# Patient Record
Sex: FEMALE | Race: WHITE | HISPANIC OR LATINO | Employment: PART TIME | URBAN - METROPOLITAN AREA
[De-identification: names, ages, dates, MRNs, and addresses within clinical notes are randomized per-mention and may not be internally consistent; named-entity substitution may affect disease eponyms.]

---

## 2020-01-29 ENCOUNTER — OFFICE VISIT (OUTPATIENT)
Dept: OBGYN CLINIC | Facility: CLINIC | Age: 30
End: 2020-01-29

## 2020-01-29 VITALS
WEIGHT: 148 LBS | SYSTOLIC BLOOD PRESSURE: 106 MMHG | DIASTOLIC BLOOD PRESSURE: 60 MMHG | BODY MASS INDEX: 24.66 KG/M2 | HEIGHT: 65 IN

## 2020-01-29 DIAGNOSIS — N91.2 AMENORRHEA: Primary | ICD-10-CM

## 2020-01-29 DIAGNOSIS — N89.8 VAGINAL DISCHARGE: ICD-10-CM

## 2020-01-29 DIAGNOSIS — Z3A.01 LESS THAN 8 WEEKS GESTATION OF PREGNANCY: ICD-10-CM

## 2020-01-29 LAB
BV WHIFF TEST VAG QL: NEGATIVE
CLUE CELLS SPEC QL WET PREP: NEGATIVE
SL AMB POCT URINE HCG: POSITIVE
T VAGINALIS VAG QL WET PREP: NEGATIVE
YEAST VAG QL WET PREP: NORMAL

## 2020-01-29 PROCEDURE — 87491 CHLMYD TRACH DNA AMP PROBE: CPT | Performed by: OBSTETRICS & GYNECOLOGY

## 2020-01-29 PROCEDURE — 87591 N.GONORRHOEAE DNA AMP PROB: CPT | Performed by: OBSTETRICS & GYNECOLOGY

## 2020-01-29 PROCEDURE — 81025 URINE PREGNANCY TEST: CPT | Performed by: OBSTETRICS & GYNECOLOGY

## 2020-01-29 PROCEDURE — 87210 SMEAR WET MOUNT SALINE/INK: CPT | Performed by: OBSTETRICS & GYNECOLOGY

## 2020-01-29 PROCEDURE — G0145 SCR C/V CYTO,THINLAYER,RESCR: HCPCS | Performed by: OBSTETRICS & GYNECOLOGY

## 2020-01-29 PROCEDURE — 99214 OFFICE O/P EST MOD 30 MIN: CPT | Performed by: OBSTETRICS & GYNECOLOGY

## 2020-01-29 RX ORDER — PRENATAL VIT NO.126/IRON/FOLIC 28MG-0.8MG
1 TABLET ORAL
Qty: 90 TABLET | Refills: 3 | Status: SHIPPED | OUTPATIENT
Start: 2020-01-29 | End: 2020-10-26 | Stop reason: SDUPTHER

## 2020-01-29 NOTE — PATIENT INSTRUCTIONS
Pregnancy   WHAT YOU NEED TO KNOW:   A normal pregnancy lasts about 40 weeks  The first trimester lasts from your last period through the 12th week of pregnancy  The second trimester lasts from the 13th week of your pregnancy through the 23rd week  The third trimester lasts from your 24th week of pregnancy until your baby is born  If you know the date of your last period, your healthcare provider can estimate your due date  You may give birth to your baby any time from 37 weeks to 2 weeks after your due date  DISCHARGE INSTRUCTIONS:   Return to the emergency department if:   · You develop a severe headache that does not go away  · You have new or increased vision changes, such as blurred or spotted vision  · You have new or increased swelling in your face or hands  · You have pain or cramping in your abdomen or low back  · You have vaginal bleeding  Contact your healthcare provider or obstetrician if:   · You have abdominal cramps, pressure, or tightening  · You have a change in vaginal discharge  · You cannot keep food or drinks down, and you are losing weight  · You have chills or a fever  · You have vaginal itching, burning, or pain  · You have yellow, green, white, or foul-smelling vaginal discharge  · You have pain or burning when you urinate, less urine than usual, or pink or bloody urine  · You have questions or concerns about your condition or care  Medicines:   · Prenatal vitamins  provide some of the extra vitamins and minerals you need during pregnancy  Prenatal vitamins may also help to decrease the risk of certain birth defects  · Take your medicine as directed  Contact your healthcare provider if you think your medicine is not helping or if you have side effects  Tell him or her if you are allergic to any medicine  Keep a list of the medicines, vitamins, and herbs you take  Include the amounts, and when and why you take them   Bring the list or the pill bottles to follow-up visits  Carry your medicine list with you in case of an emergency  Follow up with your healthcare provider or obstetrician as directed:  Go to all of your prenatal visits during your pregnancy  Write down your questions so you remember to ask them during your visits  Body changes that may occur during your pregnancy:   · Breast changes  you will experience include tenderness and tingling during the early part of your pregnancy  Your breasts will become larger  You may need to use a support bra  You may see a thin, yellow fluid, called colostrum, leak from your nipples during the second trimester  Colostrum is a liquid that changes to milk about 3 days after you give birth  · Skin changes and stretch marks  may occur during your pregnancy  You may have red marks, called stretch marks, on your skin  Stretch marks will usually fade after pregnancy  Use lotion if your skin is dry and itchy  The skin on your face, around your nipples, and below your belly button may darken  Most of the time, your skin will return to its normal color after your baby is born  · Morning sickness  is nausea and vomiting that can happen at any time of day  Avoid fatty and spicy foods  Eat small meals throughout the day instead of large meals  Delores may help to decrease nausea  Ask your healthcare provider about other ways of decreasing nausea and vomiting  · Heartburn  may be caused by changes in your hormones during pregnancy  Your growing uterus may also push your stomach upward and force stomach acid to back up into your esophagus  Eat 4 or 5 small meals each day instead of large meals  Avoid spicy foods  Avoid eating right before bedtime  · Constipation  may develop during your pregnancy  To treat constipation, eat foods high in fiber such as fiber cereals, beans, fruits, vegetables, whole-grain breads, and prune juice  Get regular exercise and drink plenty of water   Your healthcare provider may also suggest a fiber supplement to soften your bowel movements  Talk to your healthcare provider before you use any medicines to decrease constipation  · Hemorrhoids  are enlarged veins in the rectal area  They may cause pain, itching, and bright red bleeding from your rectum  To decrease your risk of hemorrhoids, prevent constipation and do not strain to have a bowel movement  If you have hemorrhoids, soak in a tub of warm water to ease discomfort  Ask your healthcare provider how you can treat hemorrhoids  · Leg cramps and swelling  may be caused by low calcium levels or the added weight of pregnancy  Raise your legs above the level of your heart to decrease swelling  During a leg cramp, stretch or massage the muscle that has the cramp  Heat may help decrease pain and muscle spasms  Apply heat on your muscle for 20 to 30 minutes every 2 hours for as many days as directed  · Back pain  may occur as your baby grows  Do not stand for long periods of time or lift heavy items  Use good posture while you stand, squat, or bend  Wear low-heeled shoes with good support  Rest may also help to relieve back pain  Ask your healthcare provider about exercises you can do to strengthen your back muscles  Stay healthy during your pregnancy:   · Eat a variety of healthy foods  Healthy foods include fruits, vegetables, whole-grain breads, low-fat dairy foods, beans, lean meats, and fish  Drink liquids as directed  Ask how much liquid to drink each day and which liquids are best for you  Limit caffeine to less than 200 milligrams each day  Limit your intake of fish to 2 servings each week  Choose fish low in mercury such as canned light tuna, shrimp, crab, salmon, cod, or tilapia  Do not  eat fish high in mercury such as swordfish, tilefish, yovanny mackerel, and shark  · Take prenatal vitamins as directed  Your need for certain vitamins and minerals, such as folic acid, increases during pregnancy   Prenatal vitamins provide some of the extra vitamins and minerals you need  Prenatal vitamins may also help to decrease the risk of certain birth defects  · Ask how much weight you should gain during your pregnancy  Too much or too little weight gain can be unhealthy for you and your baby  · Talk to your healthcare provider about exercise  Moderate exercise can help you stay fit  Your healthcare provider will help you plan an exercise program that is safe for you during pregnancy  · Do not smoke  If you smoke, it is never too late to quit  Smoking increases your risk of a miscarriage and other health problems during your pregnancy  Smoking can cause your baby to be born too early or weigh less at birth  Ask your healthcare provider for information if you need help quitting  · Do not drink alcohol  Alcohol passes from your body to your baby through the placenta  It can affect your baby's brain development and cause fetal alcohol syndrome (FAS)  FAS is a group of conditions that causes mental, behavior, and growth problems  · Talk to your healthcare provider before you take any medicines  Many medicines may harm your baby if you take them when you are pregnant  Do not take any medicines, vitamins, herbs, or supplements without first talking to your healthcare provider  Never use illegal or street drugs (such as marijuana or cocaine) while you are pregnant  Safety tips:   · Avoid hot tubs and saunas  Do not use a hot tub or sauna while you are pregnant, especially during your first trimester  Hot tubs and saunas may raise your baby's temperature and increase the risk of birth defects  · Avoid toxoplasmosis  This is an infection caused by eating raw meat or being around infected cat feces  It can cause birth defects, miscarriages, and other problems  Wash your hands after you touch raw meat  Make sure any meat is well-cooked before you eat it  Avoid raw eggs and unpasteurized milk   Use gloves or ask someone else to clean your cat's litter box while you are pregnant  · Ask your healthcare provider about travel  The most comfortable time to travel is during the second trimester  Ask your healthcare provider if you can travel after 36 weeks  You may not be able to travel in an airplane after 36 weeks  He may also recommend that you avoid long road trips  © 2017 [employer] [employer] Information is for End User's use only and may not be sold, redistributed or otherwise used for commercial purposes  All illustrations and images included in CareNotes® are the copyrighted property of [employer]  or [employer]  The above information is an  only  It is not intended as medical advice for individual conditions or treatments  Talk to your doctor, nurse or pharmacist before following any medical regimen to see if it is safe and effective for you

## 2020-01-30 LAB
C TRACH DNA SPEC QL NAA+PROBE: NEGATIVE
N GONORRHOEA DNA SPEC QL NAA+PROBE: NEGATIVE

## 2020-02-05 ENCOUNTER — INITIAL PRENATAL (OUTPATIENT)
Dept: OBGYN CLINIC | Facility: CLINIC | Age: 30
End: 2020-02-05

## 2020-02-05 VITALS
DIASTOLIC BLOOD PRESSURE: 70 MMHG | SYSTOLIC BLOOD PRESSURE: 118 MMHG | WEIGHT: 147 LBS | BODY MASS INDEX: 24.49 KG/M2 | HEART RATE: 81 BPM | HEIGHT: 65 IN

## 2020-02-05 DIAGNOSIS — Z34.91 PRENATAL CARE IN FIRST TRIMESTER: Primary | ICD-10-CM

## 2020-02-05 DIAGNOSIS — R51.9 NONINTRACTABLE HEADACHE, UNSPECIFIED CHRONICITY PATTERN, UNSPECIFIED HEADACHE TYPE: Primary | ICD-10-CM

## 2020-02-05 LAB
LAB AP GYN PRIMARY INTERPRETATION: NORMAL
Lab: NORMAL

## 2020-02-05 PROCEDURE — OBC

## 2020-02-05 RX ORDER — ACETAMINOPHEN 500 MG
TABLET ORAL
Qty: 30 TABLET | Refills: 0 | Status: SHIPPED | OUTPATIENT
Start: 2020-02-05 | End: 2020-02-12

## 2020-02-05 NOTE — PATIENT INSTRUCTIONS
The First Trimester  (up through 14 weeks)   YOUR BABY   · Your baby starts to develop when your egg and a sperm come together  · Your baby grows inside your uterus (also called your womb)  It floats inside a bag of water - called the amniotic sac  The baby is connected to you by an umbilical cord which goes from the babys belly to the placenta  The placenta is attached to your uterus, and the placenta is where blood, oxygen, and food cross over from you to your baby  · Unhealthy things like drugs, alcohol, and tobacco can also cross over from you to your baby through the placenta  It is important to avoid these things as they can be harmful to how your baby develops and grows  · By the end of the first month, your babys heart is beating  The baby is about the size of a piece of rice  · By the end of the second month, all of you babys organ (heart, lungs, brain, skull) have completely formed  Now they just have to continue maturing and growing  The baby is about the size of a grape  · By the end of the third month, your baby is about 4 inches long! YOUR BODY   · Your period stops - this might be the first sign that you have that you are pregnant   · Your breasts may become sore and tender  · You may feel very tired  · You may have an upset stomach with nausea and/or vomiting which can occur at any time of day - or sometimes all day long  · You may feel malin and cranky  You may also feel afraid or happy  This is all normal and natural!   · You may lose or gain weight  This is okay as well, as long as you are not losing or gaining a lot of weight          The Second Trimester (14-28 weeks)   YOUR BABY   * 4th month   · your baby has eyelashes and eyebrows   · your baby kicks, moves, and swallows   · your baby is 6 to 7 inches long   * 5th month   · your baby has fingernails   · your baby starts to having sleeping and awake cycles   · baby becomes very active (even though you may not always feel it)   · your baby is 8 to 12 inches long and weighs anywhere from ½ to 1 pound   * 6th month   · your babys eyes are almost completely formed and they will soon open and close   · babys skin is red and wrinkly and covered with a fine, soft hair called lanugo   · baby continues to be very active and you should be feeling it very well and very often   · your baby is 11 to 14 inches long     YOUR BODY   · Morning sickness usually starts to go away and women generally start to gaining weight more quickly  · You may start to get stretch marks on your belly and/or breasts which can be itchy  Softly rub lotion onto them to help relieve the itching  · Your vaginal discharge may become whitish in color  · You may become constipated  Try increasing fiber in your diet, or you may take a stool softener pill (such as Colace) to relieve the discomfort  · You may start to have heartburn  Medications like Tums or Maalox may help to relieve this  Try staying in a sitting position for at least an hour after meals to decrease heartburn  · You should try to get 8 hours of sleep at night, plus a nap during the day if possible  · You should not sit for longer than 2 hours without taking a break to stretch your legs  The Third Trimester  (28-42 weeks)  YOUR BABY   · your baby sucks its thumb now! · your baby can hear voices and respond to touch   so talk to him or her!!   · your babys brain grows and develops most in the last 2 months of pregnancy   · babys head and bones are soft and flexible so they can fit through the birth canal   · babys movements change towards the end of pregnancy because there is less room for kicking and stretching in your belly   · babys lungs are not fully developed and completely ready to breathe on their own until the last 3-4 weeks before your due date    YOUR BODY   · your belly is growing a lot now   · it may become more difficult to sleep well at night or to be as active as you usually are   · you may sweat more than usual   · you will become more off-balancebe careful not to fall!!   · you may develop hemorrhoids (which can be painful and make it difficult to sit down)   · the last two months of pregnancy can become very uncomfortablewith backaches, headaches, and heartburn   · you can start to have contractions  as long as they are irregular and less than 5 per hour, this is a normal part of your body getting ready to have a baby   · your cervix may start to thin out and open upto get ready for delivery   · you may find yourself needing to pee very often  because baby is pressing on your bladder so much   · you may get out of breathe more quickly than usual          Is my baby developing normally? GENETIC SCREENING    One of the concerns that many women have about their pregnancy is whether their baby is developing normally  There are various different tests that we can use try to help determine whether babies are developing normally or not  Some women have a higher risk for their baby to develop abnormally (sometimes called a birth defect), but it can happen to ANYONE  That is why we offer these tests to ALL women during their pregnancies  None of these tests are required  It is your choice which ones you choose to have done or choose to NOT have done during your pregnancy  Some of these tests are only available at a particular time during your pregnancy, so it is important to make decisions about what testing you desire early in the pregnancy  Here is some information about these different tests to help you make decisions about whether any of these tests are right for you       * ANATOMY ULTRASOUND: this ultrasound is done between 18 to 22 weeks of pregnancy and looks VERY closely at all of your babys parts and pieces to look for signs of any abnormal development; ultrasound is not perfect and CANNOT detect ALL types of birth defects (especially Down Syndrome) - but it is a very helpful test     * SEQUENTIAL SCREENING: this is actually a combination of tests which include an ultrasound measuring the back of your baby's neck between 11-13 weeks and bloodwork from you at that time and again between 15-22 weeks; this test can help tell us the risk for your baby to be affected by certain chromosome abnormalities or birth defects  * QUAD SCREEN: this test is done with just bloodwork from you between 15-22 weeks of pregnancy; it can help tell us the risk for your baby to be affected by certain chromosome abnormalities or birth defects  * CELL-FREE DNA SCREEN: this test is done with just bloodwork from you any time after 10 weeks of pregnancy; it is very accurate at telling us if your baby has a high chance for Down syndrome or other chromosome abnormalities but is generally reserved for women who have a high-risk factor for a baby with a chromosome abnormality  * MATERNAL SERUM ALPHA-FETO PROTEIN SCREEN:  this test is done with just bloodwork from you between 15-22 weeks of pregnancy; it helps us get an idea if your baby is developing a birth defect like spina bifida  * AMNIOCENTESIS: this test involves using a very thin needle inserted into your uterus to take out a little bit of the fluid around your baby for testing; it can be performed any time after 16 weeks of pregnancy; this test tells us for certain if your baby has particular birth defects (especially chromosome abnormalities); there is a very small risk for miscarriage to occur when you have this testing performed; this test is generally reserved for women who have a high-risk factor for a baby with an abnormality         Women who have a higher risk for babies to develop abnormally (sometimes called a birth defect) include women with the following:   · Age 28 years or older   · Obesity at the time of conception   · Diabetes at the time of conception   · A previous child with a birth defect   · Taking medications which may cause a birth defect     Here are some important questions to ask yourself when deciding which (if any) of these tests you want to have performed  · Do I want to know before birth if my baby has a birth defect? · What will I do with this information if the result shows a high chance for a birth defect? · How would learning about a birth defect help me make choices about my pregnancy? · Will these tests help me feel more reassured or just more anxious and afraid? Medications that are safe to take     Here is a list of medications which are safe for you to take during pregnancy without having to discuss with the nurse practitioner or physician:     * Tylenol/Acetaminophen (headache or fever)   * Benadryl/Diphenhydramine (allergies, runny nose, difficult sleeping, itching)   * Claritin/Loratidine (allergies, runny nose)   * Zyrtec/Cetirizine (allergies, runny nose)   * Allegra/Fexofenadine (allergies)  * Robitussin/Guaifenesin (coughing)   * Sudafed/Pseudoephedrine (runny nose/congestion)   * Colace/Docusate sodium (constipation)   * Maalox/Magnesium hydroxide (heartburn, indigestion)   * Zantac/Ranitidine (heartburn, indigestion)   * Pepcid/Famotidine (heartburn, indigestion)   * Tums/Calcium carbonate (heartburn, nausea)   * Kaopectate/Bismuth subsalicylate (diarrhea)   * Immodium/Loperamide (diarrhea)   * Vitamin B6/Pyrodoxine(nausea)   * Doxylamine/Unisom (nausea, insomnia)     Any other medications should be discussed with either our nurse practitioner or one of our physicians before taking  Nutrition in Pregnancy  Good Nutrition is a VERY important part of having a healthy pregnancy and healthy baby    You should follow a healthy diet which include the following:   · Vegetables (which are dark green and leafy): at least 2 servings each day   · Protein (meat, eggs, beans, nuts, peanut butter): 3-4 servings each day   · Breads/whole grains (bread, pasta, rice, tortillas, potatoes): 3 servings each day   · Dairy (milk, yogurt, cheese): 3-4 servings each day   · Water: 6-8 glasses per day   · Calories: approximately 2000 to 2200 calories per day     Weight Gain   Recommended weight gain for you during your pregnancy is based on your body mass index (BMI) at the time that you became pregnant  Pre-pregnant BMI Recommended weight gain   Underweight (BMI less than 18 5) 28 to 40 pounds   Normal weight (BMI 18 5-24 9) 25 to 35 pounds   Overweight (BMI 25-29 9) 15 to 25 pounds   Obese (BMI 30 or greater) 11 to 20 pounds     Food safety   It is VERY important to eat only safely-prepared foods during pregnancy as you and your baby have a higher risk than usual for being affected by foodborne illnesses  Follow these steps to ensure that you and your baby are safe from foodborne illnesses while you are pregnant:   · wash hands thoroughly with warm water and soap before and after handling any foods   · wash cutting boards, dishes, utensils, and countertops with hot water and soap before and after preparing any foods   · rinse raw fruits and vegetables thoroughly under running water before eating   · keep raw meat and seafood separate from other foods and use different cutting boards/utensils to handle raw meat than for other foods   · put cooked food on a freshly clean plate   · cook all of your foods thoroughly   · discard foods that have been left out for more than 2 hours   · refrigerate or freeze any foods than can spoil     There are three particular foodborne risks that you should be aware of and avoid as they can cause serious harm to your unborn child       * Listeria (a harmful bacteria)   · dont eat hot dogs or deli meats (unless theyre reheated until steaming hot)   · dont eat soft cheeses (such as Feta, Brie, Camembert) unless they are specifically labeled as being made with pasteurized milk   · dont drink raw (unpasteurized) milk   · dont eat refrigerated pates or meat spreads   · dont eat refrigerated smoked seafood unless its in a cooked dish like a casserole     * Mercury (a metal which is found in certain fish in high levels)   · dont eat shark, tilefish, yovanny mackerel, or swordfish   · dont eat more than 12 ounces per week of shrimp, salmon, pollock, or catfish   · when eating tuna fish, you can have up to 6 ounces per week of canned albacore tuna OR up to 12 ounces of canned light tuna     * Toxoplasma (a harmful parasite)   · cook meat thoroughly before eating   · wear gloves when gardening or handling sand from a childs sandbox   · if you ha tve a cat, have someone else change the litter box while you are pregnant  · if you HAVE to clean it yourself, be sure to wash your hands thoroughly afterwards with warm water and soap  · dont get a NEW cat while you are pregnant          Exercise and Pregnancy     Exercise has many benefits for you  It:   · Improves your posture and appearance   · Relieves back pain   · Improves circulation   · Increases flexibility   · Decreases stress   · Helps you feel good and gives you a better self-image   · Gives you energy   · Helps you sleep   · Strengthens and stretches your muscles, which can help ease the pregnancy discomforts   · Increases your stamina and flexibility     The healthier you are going into labor, the faster and easier your recovery will be after birth  Exercise may be good for your baby too  Both of you feel calm and happy after a workout  Also, your unborn baby likes the motion  How much time to exercise:   Check with your health provider before you begin and make sure you are healthy enough to start  If you exercised before pregnancy, it is safe to do moderate exercise of 30 minutes or more every day  If not, start with 20 minutes a day, 3 times a week  If you were jogging before your pregnancy, it is safe to continue    If you arent used to jogging, pregnancy is not a good time to start  Your First Trimester   In the first three months you might feel tired and sick to your stomach  Only exercise when you feel up to it  But, even a little bit of activity can boost your energy  Consider a walk, stretch, or some yoga  Your Second Trimester   You might have more energy, so take advantage of the times when youre feeling good to do activities you enjoy  Safe exercises include low-impact aerobics (which elevate your heart rate), easy dancing, walking, swimming, stationary cycling, easy cross-country skiing, and yoga  Go easy on high-impact sports like running, tennis, or sports that could cause you to fall, like downhill skiing or horseback riding  Your Third Trimester   You might feel more tired and have backaches from the extra weight you are carrying  Your third trimester is an important time to use good posture, bend at the knees to pick things up, and not lift any heavy objects  Safety Rules   · Always stretch before and after working out  · Never workout so hard that you cant talk at the same time  · Dont exercise in very hot or very cold weather  · Drink plenty of water before, during and after exercise  · Be aware of your comfort level - stop what youre doing if you have pain, if you feel faint, or if youre becoming exhausted  · Avoid lying flat on your back after the first trimester as this position can decrease blood flow to your uterus  NAUSEA AND VOMITING IN PREGNANCY    1  Eat meals and snacks slowly  2  Eat every 1-2 hours to avoid a full stomach  3  Don't skip meals, avoid empty stomach  4  Eat a snack prior to getting out of bed  5  Avoid food and beverages with a strong smell  6  Avoid dehydration - drink enough fluid to keep your urine pale yellow  7  Drink fluids before a meal to minimize the effect of a full stomach  8  Limit the amount of coffee and beverages that contain caffeine    9  Eliminate spicy, odorous, high fat (fried foods), acidic (tomato products), and sweet foods  10  Fluid that contain lemon, mint, or orange can usually be well-tolerated  11  Snacks and meals that contain low-fat protein (lean meats, fish, poultry and eggs) along with eating easily digestible carbs (fruit, rice, toast, crackers and dry cereal) may be tolerated better  12  Foods with ladonna may be well-tolerated  Try using ladonna root powder, capsules, or extract (up to 1000mg per day)  13  Drink liquids in small amounts  14  Try taking Vitamin B6 (50mg at bedtime, 25mg in the morning, and 25mg in the afternoon) with Unisom/Doxylamine (25mg at bedtime)  15  If nausea and vomiting persist, please contact the office  SEX AND PREGNANCY    1  Is sex safe during pregnancy? Sex is usually perfectly safe throughout pregnancy and does not hurt your growing baby  [place] and orgasms are fine unless you have a medical problem  If you are concerned, discuss it with your healthcare provider  2  How often is sex OK? Frequent sex should not hurt the baby if you are having a healthy pregnancy  3  Does sex cause miscarriage? There are no facts that link orgasms and miscarriage  However, if you have a history of miscarriage, your provider may caution you against sex and orgasm in your first trimester  4  What if I feel the baby move after sex? Is that a bad sign? No   The baby is well-protected in your uterus  And, the baby often moves a lot no matter what you do  5  Should my partner be putting weight on my abdomen? No, not as you get bigger  Find new positions for lovemaking as your pregnancy goes along  Try lying on your side or you be the one on top  Don't lie flat on your back  6  Can sex cause me to go into labor early? Not normally  However, orgasms cause the uterus to have mild contractions if you are near labor    If you have a history of early labor, your provider might warn you against intercourse and orgasm  Also, nipple stimulation causes your uterus to contract if you are near labor  Ask your provider if this is safe for you  7   Are all kinds of sex safe during pregnancy? No   Never let your partner blow air into your vagina  Do not put any object in your vagina that could cause injury or infection  If you have heavy bleeding or your water breaks during sex, stop and call your provider immediately  8  What if I don't feel like having sex? Be open about it with your partner  In early pregnancy, you may feel too tired or be sick to your stomach  In the last weeks of pregnancy, your physical changes may make you feel too large or too awkward for sex  You may also be thinking more about motherhood than sex by this time  However, in your middle months, you may actually want sex more often than usual   If your partner doesn't want sex for fear of hurting you or the baby, be reassuring that sex is safe and natural during pregnancy  9  Are there times I should avoid sex? Yes, if:  · Sex is painful  · You have unexplained vaginal bleeding  · Your have a low-lying placenta or placenta previa  · You have  labor  · Your water breaks  · You are pregnancy with twins or triplets  · You or your partner heave an unhealed herpes sore or any STD  · You can't get into a comfortable position            WARNING SIGNS DURING PREGNANCY  Call our office at [phone] for any of the followin  Vaginal bleeding  2  Sharp abdominal pain that does not go away  3  Fever (more than 100 4 and is not relieved by Tylenol)  4  Persistent vomiting lasting greater than 24 hours  5  Chest pain   6  Pain or burning when you urinate  7  Severe headache that doesn't resolve with Tylenol  8  Blurred vision or seeing spots in your vision  9  Sudden swelling of your face or hands  10  Redness, swelling or pain in a leg  11  A sudden weight gain in just a few days  12   Decrease in your baby's movement (after 28 weeks or the 6th month of pregnancy)  13  A loss of watery fluid from your vagina - can be a gush, a trickle or continuous wetness  14  After 20 weeks of pregnancy, rhythmic cramping (greater than 4 per hour) or menstrual like low/pelvic pain            BREASTFEEDING     BENEFITS FOR BABIES   * stronger immune systems (less allergies, eczema, asthma, and childhood cancers)   * less diarrhea and constipation or other GI diseases   * fewer colds and ear infections   * better vision and teeth (fewer cavities)   * improves IQ   * lower rates of diabetes and obesity in childhood     BENEFITS FOR MOMS   · promotes faster weight loss after delivery   · lower risk for postpartum depression   · lower risk for breast, uterine, and ovarian cancers   · lower risk for osteoporosis developing with age   · easier than formula - is always right with you, clean, and the right temperature   · less expensive than formulaits FREE !!!!     KEYS TO SUCCESSFUL BREASTFEEDING   · keep baby skin-to-skin until after first feeding event   · keep baby in your room with you during your hospital stay after delivery   · avoid any bottle feedings (unless medically necessary)   · limit the use of pacifiers and swaddling   · ask for help if you are having any issueslactation consultants (who specialize in breastfeeding) are available to help you   · a healthy diet for momeating a variety of foods and portions in moderation    THINGS YOU SHOULD KNOW ABOUT BREASTFEEDING   · most medications are considered compatible with breastfeeding by the [address] [place] [address] [employer], but you should check with your health care provider or lactation consultant prior to taking a new medicationjust to be sure it is safe   · alcohol (beer, wine, liquor) can be passed from mother to baby through breast milkan occasional, social drink is deemed acceptable by the [employer] of [place] [employer]   more than that should be avoided   · breastfeeding is NOT an effective method of birth control   · nicotine (in cigarettes) can pass from mother to baby through breast milk   however, for mothers who smoke, it is still healthier to breastfeed than use formula   · caffeine should be limited to 1-3 cups per dayincludes coffee, soda, energy drinks           VACCINES IN PREGNANCY    TDAP  Whopping cough (or pertusSsis) can be serious for anyone, but for your , it can be life-threatning  Up to 20 babies die each year in the [place]  Due to whopping cough  About half of babies younger than 3year old who get whopping cough need treatment in the hospital   The younger the baby is when he or she gets whopping cough, the more likely he or she will need to be treated in a hospital   When you receive the whopping cough vaccine (Tdap) during your pregnancy, your body will create protective antibodies and pass some of them to your baby before birth  These antibodies can help protect your baby from getting whopping cough until they are old enough to be vaccinated themselves (usually around 7 months of age)  INFLUENZA  Changes in your immune, heart, and lung functions during pregnancy make you more likely to get seriously ill from the flu  Catching the flu also increases your chances for serious problems for your developing baby, including premature labor and delivery  It is recommended that all women who are pregnant during flu season should receive an influenza vaccine  SEAT BELT USE IN PREGNANCY    Whether you are pregnant or not, you should wear a seat belt EVERY time you are in a car  A seat belt is the best protection for both you AND your unborn child  To use a seat belt properly while you are pregnant, you may need to adjust the front seat several times as you grow so that there is always at least 10 inches between the center of your chest and the steering wheel or dashboard, yet still be able to comfortably reach the pedals    The shoulder belt should lay across your chest (between your breasts) and away from your neck  The lap belt should be secured BELOW your belly so that it fits snugly across your hips and pelvic bone  You should NOT disable the air bag on your vehicle  Seat belts and air bags work best when used together to protect you and your unborn child

## 2020-02-05 NOTE — LETTER
Proof of Pregnancy Letter    Vita Sweet  1990  [address] [place]        02/05/20      Vita Sweet is a patient at our facility  Vita Sweet Estimated Date of Delivery: 9/4/2020       Any questions or concerns, please feel free to contact our office      Sincerely,     [facility]

## 2020-02-05 NOTE — PROGRESS NOTES
OBSTETRIC INTAKE VISIT    Henry Thompson presents today for initial OB intake  History obtained from patient and she reports it as follows:    Past Medical History:   Diagnosis Date    Hyperlipemia      History reviewed  No pertinent surgical history  OB History    Para Term  AB Living   2 1 1     1   SAB TAB Ectopic Multiple Live Births           1      # Outcome Date GA Lbr Rory/2nd Weight Sex Delivery Anes PTL Lv   2 Current            1 Term 18 40w0d  3374 g (7 lb 7 oz) F Vag-Breech EPI  ANITRA     Social History     Tobacco Use    Smoking status: Never Smoker    Smokeless tobacco: Never Used   Substance Use Topics    Alcohol use: Not Currently    Drug use: Never       Current Outpatient Medications:     prenatal vitamin (CLASSIC FORMULA) 28-0 8 mg, Take 1 tablet by mouth daily with breakfast, Disp: 90 tablet, Rfl: 3    No Known Allergies    FRANK 2020    Review of Systems:   Denies vaginal bleeding or leaking of fluid  Denies uterine contractions or abdominal pain  Exam:    WT 147LB, HT 5'5, B/P 118/70, HR88    Plan:  1  1  Prenatal care in first trimester  - Ambulatory referral to social work care management program  - Ambulatory Referral to Maternal Fetal Medicine  - Prenatal Panel  - Hemoglobin Electrophoresis  - Hepatitis C antibody; Future  2  Next ultrasound scheduled for 2020  3  Return in 1 week  for next prenatal H/P visit  4  Full OB physical and pelvic exam to be done at next visit with pap/GC/CT  5  Referral placed for  consultation    6  Reviewed the following educational topics with patient:   -routine prenatal visit/ultrasound/labwork schedule   -nutritional demands of pregnancy, healthy dietary habits   -listeria, toxoplasmosis, seafood precautions   -weight gain expectations (based on pre-pregnant BMI)   -exercise, rest, and sexual activity during pregnancy   -abstinence from alcohol, tobacco, and illegal drugs   -common discomforts of pregnancy and appropriate management   -OTC medications safe to use in pregnancy   -genetic screening options   -WIC referral   -symptoms to report to [facility] provider    -signs of PTL    -vaginal bleeding/leaking of fluid    -severe n/v-unable to tolerate ANY food/fluids for more than 24 hours    Patient declined flu vaccine, explained to patient how to contact OB provider after office hours  Patient oriented to office and all questions were answered        Edwin Calhoun RN  2/5/2020

## 2020-02-06 ENCOUNTER — OFFICE VISIT (OUTPATIENT)
Dept: FAMILY MEDICINE CLINIC | Facility: CLINIC | Age: 30
End: 2020-02-06

## 2020-02-06 ENCOUNTER — APPOINTMENT (OUTPATIENT)
Dept: LAB | Facility: HOSPITAL | Age: 30
End: 2020-02-06
Attending: OBSTETRICS & GYNECOLOGY
Payer: COMMERCIAL

## 2020-02-06 VITALS
BODY MASS INDEX: 24.66 KG/M2 | WEIGHT: 148 LBS | HEIGHT: 65 IN | TEMPERATURE: 97 F | DIASTOLIC BLOOD PRESSURE: 64 MMHG | RESPIRATION RATE: 18 BRPM | HEART RATE: 81 BPM | OXYGEN SATURATION: 98 % | SYSTOLIC BLOOD PRESSURE: 110 MMHG

## 2020-02-06 DIAGNOSIS — Z34.91 PRENATAL CARE IN FIRST TRIMESTER: ICD-10-CM

## 2020-02-06 DIAGNOSIS — Z86.39 HX OF HYPERLIPIDEMIA: ICD-10-CM

## 2020-02-06 DIAGNOSIS — Z00.00 PHYSICAL EXAM: Primary | ICD-10-CM

## 2020-02-06 LAB
ABO GROUP BLD: NORMAL
BACTERIA UR QL AUTO: ABNORMAL /HPF
BASOPHILS # BLD AUTO: 0 THOUSANDS/ΜL (ref 0–0.1)
BASOPHILS NFR BLD AUTO: 0 % (ref 0–1)
BILIRUB UR QL STRIP: NEGATIVE
BLD GP AB SCN SERPL QL: NEGATIVE
CHOLEST SERPL-MCNC: 307 MG/DL
CLARITY UR: CLEAR
COLOR UR: ABNORMAL
EOSINOPHIL # BLD AUTO: 0.1 THOUSAND/ΜL (ref 0–0.4)
EOSINOPHIL NFR BLD AUTO: 1 % (ref 0–6)
ERYTHROCYTE [DISTWIDTH] IN BLOOD BY AUTOMATED COUNT: 13.5 %
GLUCOSE UR STRIP-MCNC: NEGATIVE MG/DL
HBV SURFACE AG SER QL: NORMAL
HCT VFR BLD AUTO: 37 % (ref 36–46)
HCV AB SER QL: NORMAL
HDLC SERPL-MCNC: 49 MG/DL
HGB BLD-MCNC: 12.6 G/DL (ref 12–16)
HGB UR QL STRIP.AUTO: NEGATIVE
KETONES UR STRIP-MCNC: ABNORMAL MG/DL
LDLC SERPL CALC-MCNC: 227 MG/DL
LEUKOCYTE ESTERASE UR QL STRIP: 25
LYMPHOCYTES # BLD AUTO: 1.9 THOUSANDS/ΜL (ref 0.5–4)
LYMPHOCYTES NFR BLD AUTO: 19 % (ref 25–45)
MCH RBC QN AUTO: 29.6 PG (ref 26–34)
MCHC RBC AUTO-ENTMCNC: 34 G/DL (ref 31–36)
MCV RBC AUTO: 87 FL (ref 80–100)
MONOCYTES # BLD AUTO: 0.5 THOUSAND/ΜL (ref 0.2–0.9)
MONOCYTES NFR BLD AUTO: 5 % (ref 1–10)
MUCOUS THREADS UR QL AUTO: ABNORMAL
NEUTROPHILS # BLD AUTO: 7.3 THOUSANDS/ΜL (ref 1.8–7.8)
NEUTS SEG NFR BLD AUTO: 74 % (ref 45–65)
NITRITE UR QL STRIP: NEGATIVE
NON-SQ EPI CELLS URNS QL MICRO: ABNORMAL /HPF
PH UR STRIP.AUTO: 6 [PH]
PLATELET # BLD AUTO: 266 THOUSANDS/UL (ref 150–450)
PMV BLD AUTO: 8.8 FL (ref 8.9–12.7)
PROT UR STRIP-MCNC: NEGATIVE MG/DL
RBC # BLD AUTO: 4.25 MILLION/UL (ref 4–5.2)
RBC #/AREA URNS AUTO: ABNORMAL /HPF
RH BLD: POSITIVE
RUBV IGG SERPL IA-ACNC: >175 IU/ML
SP GR UR STRIP.AUTO: 1.02 (ref 1–1.04)
SPECIMEN EXPIRATION DATE: NORMAL
TRIGL SERPL-MCNC: 153 MG/DL
UROBILINOGEN UA: NEGATIVE MG/DL
WBC # BLD AUTO: 9.9 THOUSAND/UL (ref 4.5–11)
WBC #/AREA URNS AUTO: ABNORMAL /HPF

## 2020-02-06 PROCEDURE — 81001 URINALYSIS AUTO W/SCOPE: CPT

## 2020-02-06 PROCEDURE — 36415 COLL VENOUS BLD VENIPUNCTURE: CPT

## 2020-02-06 PROCEDURE — 1036F TOBACCO NON-USER: CPT | Performed by: FAMILY MEDICINE

## 2020-02-06 PROCEDURE — 87086 URINE CULTURE/COLONY COUNT: CPT

## 2020-02-06 PROCEDURE — 83020 HEMOGLOBIN ELECTROPHORESIS: CPT

## 2020-02-06 PROCEDURE — 99385 PREV VISIT NEW AGE 18-39: CPT | Performed by: FAMILY MEDICINE

## 2020-02-06 PROCEDURE — 80061 LIPID PANEL: CPT

## 2020-02-06 PROCEDURE — 86803 HEPATITIS C AB TEST: CPT

## 2020-02-06 PROCEDURE — 80081 OBSTETRIC PANEL INC HIV TSTG: CPT

## 2020-02-06 NOTE — PROGRESS NOTES
Assessment/Plan:    Physical exam  9 wks pregnant  Follows with [facility]  Here for cholesterol check as she was previously taking simvastatin for HLD in the [facility]  Will get lipid panel with reflex, other labs per prenatal panel  Patient declines Flu and Tdap at this time  Diagnoses and all orders for this visit:    Physical exam    Hx of hyperlipidemia  -     Lipid Panel with Direct LDL reflex; Future    Other orders  -     Cancel: influenza vaccine, [id number], quadrivalent, 0 5 mL, preservative-free, for adult and pediatric patients 6 mos+ (AFLURIA, FLUARIX, FLULAVAL, FLUZONE)          Subjective:      Patient ID: Lissa Samuels is a 34 y o  female  Who presents to [facility] care  She is currently 9 weeks pregnant with her second child and would like to have her cholesterol checked  She states she was previously being treated for hyperlipidemia in the [facility], states she believes she used to take simvastatin  She states only her grandfather had elevated cholesterol, and does not believe it runs in her family  She denies headache, vision changes, chest pain, palpitations, or SOB  The following portions of the patient's history were reviewed and updated as appropriate: allergies, current medications, past family history, past medical history, past social history, past surgical history and problem list     Review of Systems   Constitutional: Negative for chills and fever  HENT: Negative for ear pain and sore throat  Eyes: Negative for pain and redness  Respiratory: Negative for cough and shortness of breath  Cardiovascular: Negative for chest pain, palpitations and leg swelling  Gastrointestinal: Negative for abdominal pain, diarrhea and nausea  Genitourinary: Negative for dysuria and hematuria  Musculoskeletal: Negative for back pain and neck pain  Neurological: Negative for dizziness and headaches  Psychiatric/Behavioral: Negative for dysphoric mood   The patient is not nervous/anxious  Objective:      /64 (BP Location: Left arm, Patient Position: Sitting, Cuff Size: Standard)   Pulse 81   Temp (!) 97 °F (36 1 °C) (Temporal)   Resp 18   Ht 5' 5" (1 651 m)   Wt 67 1 kg (148 lb)   LMP  (LMP Unknown)   SpO2 98%   BMI 24 63 kg/m²          Physical Exam   Constitutional: She is oriented to person, place, and time  She appears well-developed and well-nourished  HENT:   Head: Normocephalic and atraumatic  Right Ear: External ear normal    Left Ear: External ear normal    Nose: Nose normal    Mouth/Throat: Oropharynx is clear and moist    Eyes: Pupils are equal, round, and reactive to light  Conjunctivae and EOM are normal    Neck: Normal range of motion  Neck supple  Cardiovascular: Normal rate, regular rhythm, normal heart sounds and intact distal pulses  Pulmonary/Chest: Effort normal and breath sounds normal    Abdominal: Soft  Bowel sounds are normal  There is no tenderness  Musculoskeletal: Normal range of motion  She exhibits no edema or tenderness  Lymphadenopathy:     She has no cervical adenopathy  Neurological: She is alert and oriented to person, place, and time  Skin: Skin is warm and dry  Psychiatric: She has a normal mood and affect   Her behavior is normal

## 2020-02-06 NOTE — ASSESSMENT & PLAN NOTE
9 wks pregnant  Follows with [facility]  Here for cholesterol check as she was previously taking simvastatin for H[facility] in the [facility]  Will get lipid panel with reflex, other labs per prenatal panel  Patient declines Flu and Tdap at this time

## 2020-02-06 NOTE — PATIENT INSTRUCTIONS
Lifestyle Medicine Tip Sheet- [place]   1  Coma alimentos predominantemente menos procesados, yamel comida rápida, cenas de televisión y tocino  2  Coma cerca de la naturaleza (mercados de agricultores, productos frescos o congelados)   3  Coma rajiv dieta predominantemente basada en plantas   a  Verdes frondosos oscuros palak   b  Frutas y vegetales   c  Granos integrales: mikel integral, apenas, bayas de mikel, quinua, asia cortada en liz, arroz integral, pasta integral    d  Legumbres: frijoles, frijoles pintos, frijoles blancos, frijoles negros, garbanzos (garbanzos), frijoles lima (maduros, secos), arvejas, lentejas y edamame (frijoles de soya verdes)       4  Al menos la mitad del plato debe contener frutas o verduras  5  El líquido debe ser predominantemente agua (limite el refresco y [place])     6  Mimi el tamaño de la porción  7  ¿Qué alimentos christopher evitar o limitar? - Grasas: Específicamente saturadas y grasas trans  Se encuentran en margarinas, muchas comidas rápidas y algunos productos horneados comprados en la brianda  Las grasas saturadas y grasas trans pueden elevar patel nivel de colesterol y patel probabilidad de contraer enfermedades cardíacas  - Cuando cocine, es mejor no usar aceites, leti si es necesario, trate de limitar la cantidad de aceite utilizado, ya que el aceite contiene muchas   calorías por volumen y es muy poco saludable cuando se calienta jeannine la cocción    - Azúcar: limite o evite el azúcar, los dulces y los granos refinados  Los granos refinados se encuentran en el pan serrano, el arroz serrano, la mayoría de las formas de pasta y la mayoría de los alimentos "aperitivos" envasados  - Intente no cocinar con rosalio y evite agregar rosalio adicional a kana comidas  - Malia Corrales: los estudios hermosillo demostrado que comer mucha micheal Agarwal riesgo de ciertos problemas de [place], yamel enfermedades cardíacas y cáncer  8  7-9 horas de sueño     9   Ejercicio diario mínimo de 30 minutos (caminando alrededor de la brant)     10  Socialización (amigos y familiares)     - Explora tu vecindario  Ve al parque, pasa tiempo en la biblioteca     Si está interesado, puede leer más sobre las opciones de alimentos saludables en los siguientes sitios web:   a  NutritionTarget Data  org   b  Home cooking recipes: https://www Manta/   c  http://mary info/   d  Familydoctor  org

## 2020-02-07 DIAGNOSIS — E78.2 MIXED HYPERLIPIDEMIA: Primary | ICD-10-CM

## 2020-02-07 LAB — RPR SER QL: NORMAL

## 2020-02-08 LAB
BACTERIA UR CULT: NORMAL
HIV 1+2 AB+HIV1 P24 AG SERPL QL IA: NORMAL

## 2020-02-10 LAB
DEPRECATED HGB OTHER BLD-IMP: 0 %
HGB A MFR BLD: 2 % (ref 1.8–3.2)
HGB A MFR BLD: 98 % (ref 96.4–98.8)
HGB C MFR BLD: 0 %
HGB F MFR BLD: 0 % (ref 0–2)
HGB FRACT BLD-IMP: NORMAL
HGB S BLD QL SOLY: NEGATIVE
HGB S MFR BLD: 0 %

## 2020-02-12 ENCOUNTER — INITIAL PRENATAL (OUTPATIENT)
Dept: OBGYN CLINIC | Facility: CLINIC | Age: 30
End: 2020-02-12

## 2020-02-12 VITALS
SYSTOLIC BLOOD PRESSURE: 120 MMHG | HEIGHT: 65 IN | DIASTOLIC BLOOD PRESSURE: 60 MMHG | BODY MASS INDEX: 24.56 KG/M2 | WEIGHT: 147.4 LBS | HEART RATE: 89 BPM

## 2020-02-12 DIAGNOSIS — Z3A.10 10 WEEKS GESTATION OF PREGNANCY: Primary | ICD-10-CM

## 2020-02-12 PROBLEM — Z36.9 ENCOUNTER FOR FETAL ULTRASOUND: Status: ACTIVE | Noted: 2020-02-12

## 2020-02-12 PROBLEM — Z13.79 GENETIC SCREENING: Status: ACTIVE | Noted: 2020-02-12

## 2020-02-12 PROCEDURE — PNV: Performed by: NURSE PRACTITIONER

## 2020-02-12 NOTE — PATIENT INSTRUCTIONS
WARNING SIGNS DURING PREGNANCY  Call our office at [phone] for any of the followin  Vaginal bleeding  2  Sharp abdominal pain that does not go away  3  Fever (more than 100 4 and is not relieved by Tylenol)  4  Persistent vomiting lasting greater than 24 hours  5  Chest pain   6  Pain or burning when you urinate  7  Severe headache that doesn't resolve with Tylenol  8  Blurred vision or seeing spots in your vision  9  Sudden swelling of your face or hands  10  Redness, swelling or pain in a leg  11  A sudden weight gain in just a few days  12  Decrease in your baby's movement (after 28 weeks or the 6th month of pregnancy)  13  A loss of watery fluid from your vagina - can be a gush, a trickle or continuous wetness  14   After 20 weeks of pregnancy, rhythmic cramping (greater than 4 per hour) or menstrual like low/pelvic pain

## 2020-02-12 NOTE — PROGRESS NOTES
Jaylin Benton presents today for routine OB visit at 10w5d  /60   Pulse 89   Ht 5' 5" (1 651 m)   Wt 66 9 kg (147 lb 6 4 oz)   BMI 24 53 kg/m²  She reports no complaints  Denies vaginal bleeding or leaking of fluid  Initial OB labs reviewed as WNL with patient  Pap/GC/CT were done 20 and were all WNL  Scheduled for next ultrasound 20  Refuses influenza vaccine  Reviewed common discomforts of pregnancy in first trimester and warning signs  Advised to continue prenatal vitamins and return in 4 weeks        Past Medical History:   Diagnosis Date    Hyperlipemia     previously tx with Simvastatin, no meds since 2017     Past Surgical History:   Procedure Laterality Date    NO PAST SURGERIES       OB History        2    Para   1    Term   1       0    AB   0    Living   1       SAB   0    TAB   0    Ectopic   0    Multiple   0    Live Births   1               Social History     Tobacco Use    Smoking status: Never Smoker    Smokeless tobacco: Never Used   Substance Use Topics    Alcohol use: Not Currently     Frequency: Monthly or less     Drinks per session: 1 or 2    Drug use: Never       Current Outpatient Medications:     prenatal vitamin (CLASSIC FORMULA) 28-0 8 mg, Take 1 tablet by mouth daily with breakfast, Disp: 90 tablet, Rfl: 3      G2 Problems (from 20 to present)     Problem Noted Resolved    Fetal ultrasound 2020 by CESAR Llamas No    Overview Signed 2020 12:47 PM by CESAR Llamas     20 (8w5d) [address] confirmed         Genetic screening 2020 by CESAR Llamas No    Overview Signed 2020 12:48 PM by CESAR Llamas     Desires Sequential Screen - scheduled for 20

## 2020-02-25 PROBLEM — Z3A.12 12 WEEKS GESTATION OF PREGNANCY: Status: ACTIVE | Noted: 2020-02-25

## 2020-02-26 ENCOUNTER — ROUTINE PRENATAL (OUTPATIENT)
Dept: PERINATAL CARE | Facility: OTHER | Age: 30
End: 2020-02-26
Payer: COMMERCIAL

## 2020-02-26 VITALS
HEART RATE: 88 BPM | DIASTOLIC BLOOD PRESSURE: 67 MMHG | BODY MASS INDEX: 25.02 KG/M2 | WEIGHT: 150.2 LBS | SYSTOLIC BLOOD PRESSURE: 100 MMHG | HEIGHT: 65 IN

## 2020-02-26 DIAGNOSIS — Z36.82 ENCOUNTER FOR (NT) NUCHAL TRANSLUCENCY SCAN: ICD-10-CM

## 2020-02-26 DIAGNOSIS — Z34.91 PRENATAL CARE IN FIRST TRIMESTER: ICD-10-CM

## 2020-02-26 DIAGNOSIS — Z3A.12 12 WEEKS GESTATION OF PREGNANCY: Primary | ICD-10-CM

## 2020-02-26 PROCEDURE — 99241 PR OFFICE CONSULTATION NEW/ESTAB PATIENT 15 MIN: CPT | Performed by: OBSTETRICS & GYNECOLOGY

## 2020-02-26 PROCEDURE — 76813 OB US NUCHAL MEAS 1 GEST: CPT | Performed by: OBSTETRICS & GYNECOLOGY

## 2020-02-26 NOTE — PROGRESS NOTES
CONSULTATION: MATERNAL-FETAL MEDICINE    Dear Rachel Cooper Md  [address] [facility]  20 [facility], [address] [place] [address],    Thank you very much for your kind referral of patient Abbi Turner for Maternal-Fetal Medicine consultation  As you know, Ms Samantha Kaur is a 34y o  year-old  at 12w5d presenting for consultation for genetic screening  She has no complaints today  Her Antepartum course is significant for:   Patient Active Problem List   Diagnosis    Fetal ultrasound    Genetic screening    12 weeks gestation of pregnancy       Obstetric History:  OB History    Para Term  AB Living   2 1 1 0 0 1   SAB TAB Ectopic Multiple Live Births   0 0 0 0 1      # Outcome Date GA Lbr Rory/2nd Weight Sex Delivery Anes PTL Lv   2 Current            1 Term 18 40w0d  3374 g (7 lb 7 oz) F Vag-Spont EPI N ANITRA      Birth Comments: FOB1       Past Medical History:  Past Medical History:   Diagnosis Date    Hyperlipemia     previously tx with Simvastatin, no meds since        Past Surgical History:  Past Surgical History:   Procedure Laterality Date    NO PAST SURGERIES         Social History:   She denies current use of alcohol, drugs of abuse, tobacco, and marijuana products  Family History:  Family history was reviewed using an office screening tool, and is negative for congenital anomalies, genetic diseases, and thromboembolism in first degree relatives of this pregnancy  Family history is noncontributory  Medications:    Current Outpatient Medications:     prenatal vitamin (CLASSIC FORMULA) 28-0 8 mg, Take 1 tablet by mouth daily with breakfast, Disp: 90 tablet, Rfl: 3    Allergies:  No Known Allergies    Review of Systems:  Pertinent items are noted in HPI  Exam:  Vitals: Blood pressure 100/67, pulse 88, height 5' 5" (1 651 m), weight 68 1 kg (150 lb 3 2 oz)  General appearance: alert, well appearing, and in no distress    The remainder of her physical examination was deferred as she was here today for consultation and discussion  Ultrasound findings today are as follows: There is a single live intrauterine pregnancy with size equivalent to dates  No gross anomalies were identified on limited views  Amniotic fluid is within normal limits  Nuchal translucency measures 2 3mm which is <95th percentile for this crown-rump length  My recommendations are as follows:     12 weeks gestation of pregnancy  We reviewed the availability of genetic screening, as well as diagnostic testing, which are available to all pregnant women  We reviewed limitations, risks, and benefits of screening and testing  She elected to proceed with sequential screen step 1, which was drawn in our office today  She does not wish to pursue diagnostic testing at this time  A detailed anatomic survey as well as transvaginal cervical length screening are recommended between 18-22 weeks gestation  Evaluation and Management:  The patient was counseled regarding the above findings  The limitations of ultrasound were reviewed  The approximate face-to-face time was 15 minutes  The majority of time (>50%) was spent counseling and/or coordinating care with the patient and/or family members  At the conclusion of today's encounter, all questions were answered to her satisfaction  Thank you very much for this kind referral and please do not hesitate to contact me with any further questions or concerns      Sincerely,    Drew Stanford MD  Attending Physician, Alexx

## 2020-02-26 NOTE — ASSESSMENT & PLAN NOTE
We reviewed the availability of genetic screening, as well as diagnostic testing, which are available to all pregnant women  We reviewed limitations, risks, and benefits of screening and testing  She elected to proceed with sequential screen step 1, which was drawn in our office today  She does not wish to pursue diagnostic testing at this time  A detailed anatomic survey as well as transvaginal cervical length screening are recommended between 18-22 weeks gestation

## 2020-02-26 NOTE — LETTER
2020     Candida Calhoun MD  [address] [place] [address]    Patient: Juan Kimbrough   YOB: 1990   Date of Visit: 2020       Dear Dr Rod Romero: Thank you for referring Juan Kimbrough to me for evaluation  Below are my notes for this consultation  If you have questions, please do not hesitate to call me  I look forward to following your patient along with you  Sincerely,        Sydney Mueller MD        CC: No Recipients  Sydney Mueller MD  2020  5:57 PM  Sign at close encounter  CONSULTATION: MATERNAL-FETAL MEDICINE    Dear Candida Calhoun Md  [address]  [address] [place] [address],    Thank you very much for your kind referral of patient Juan Kimbrough for Maternal-Fetal Medicine consultation  As you know, Ms Jeri Velez is a 34y o  year-old  at 12w5d presenting for consultation for genetic screening  She has no complaints today  Her Antepartum course is significant for:   Patient Active Problem List   Diagnosis    Fetal ultrasound    Genetic screening    12 weeks gestation of pregnancy       Obstetric History:  OB History    Para Term  AB Living   2 1 1 0 0 1   SAB TAB Ectopic Multiple Live Births   0 0 0 0 1      # Outcome Date GA Lbr Rory/2nd Weight Sex Delivery Anes PTL Lv   2 Current            1 Term 18 40w0d  3374 g (7 lb 7 oz) F Vag-Spont EPI N ANITRA      Birth Comments: FOB1       Past Medical History:  Past Medical History:   Diagnosis Date    Hyperlipemia     previously tx with Simvastatin, no meds since 2017       Past Surgical History:  Past Surgical History:   Procedure Laterality Date    NO PAST SURGERIES         Social History:   She denies current use of alcohol, drugs of abuse, tobacco, and marijuana products       Family History:  Family history was reviewed using an office screening tool, and is negative for congenital anomalies, genetic diseases, and thromboembolism in first degree relatives of this pregnancy  Family history is noncontributory  Medications:    Current Outpatient Medications:     prenatal vitamin (CLASSIC FORMULA) 28-0 8 mg, Take 1 tablet by mouth daily with breakfast, Disp: 90 tablet, Rfl: 3    Allergies:  No Known Allergies    Review of Systems:  Pertinent items are noted in HPI  Exam:  Vitals: Blood pressure 100/67, pulse 88, height 5' 5" (1 651 m), weight 68 1 kg (150 lb 3 2 oz)  General appearance: alert, well appearing, and in no distress  The remainder of her physical examination was deferred as she was here today for consultation and discussion  Ultrasound findings today are as follows: There is a single live intrauterine pregnancy with size equivalent to dates  No gross anomalies were identified on limited views  Amniotic fluid is within normal limits  Nuchal translucency measures 2 3mm which is <95th percentile for this crown-rump length  My recommendations are as follows:     12 weeks gestation of pregnancy  We reviewed the availability of genetic screening, as well as diagnostic testing, which are available to all pregnant women  We reviewed limitations, risks, and benefits of screening and testing  She elected to proceed with sequential screen step 1, which was drawn in our office today  She does not wish to pursue diagnostic testing at this time  A detailed anatomic survey as well as transvaginal cervical length screening are recommended between 18-22 weeks gestation  Evaluation and Management:  The patient was counseled regarding the above findings  The limitations of ultrasound were reviewed  The approximate face-to-face time was 15 minutes  The majority of time (>50%) was spent counseling and/or coordinating care with the patient and/or family members  At the conclusion of today's encounter, all questions were answered to her satisfaction   Thank you very much for this kind referral and please do not hesitate to contact me with any further questions or concerns      Sincerely,    Simone Castellano MD  Attending Physician, Alexx

## 2020-03-04 ENCOUNTER — DOCUMENTATION (OUTPATIENT)
Dept: PERINATAL CARE | Facility: CLINIC | Age: 30
End: 2020-03-04

## 2020-03-04 NOTE — PROGRESS NOTES
After looking for results of Etta's part 1 sequential screen, I noticed that there was no report due to missing information on the sequential order slip  I called [facility] and updated the missing sonographer name  Per Kiana Jaramillo at [facility] , the information will be updated and the lab should be resulted within 24 hours

## 2020-03-06 ENCOUNTER — TELEPHONE (OUTPATIENT)
Dept: PERINATAL CARE | Facility: OTHER | Age: 30
End: 2020-03-06

## 2020-03-06 NOTE — LETTER
03/06/20  Kalyan Booth  1990    Thank you for completing Part 1 of your Sequential Screen  To obtain a complete test result, please complete blood work for Part 2 Sequential Screen between the weeks of 3/19/2020 to 4/2/2020  Based on your insurance coverage, please use one of the following locations  Call our office for any questions at [phone]      [address] [place] [address] Avenue  1492 [facility], [place], [address]   300 [facility], [place], [address]  Phone:  [phone]     Phone:  [phone]    [facility] 82  [facility] 6 [place], 960 [facility], [address] [place] [address] Road  Phone: [phone]      Phone: [phone] Jimenez Barreto for lab)    53 [facility]  59 [facility], [facility], [facility]   700 [facility], [place], [facility] [address]  Phone: [phone]      Phone:  [phone]    [address]  1401 [facility] 6   [facility], [address] [place] [address]  Phone: [phone]      Phone:  793 [facility],[address] [facility]  207 [facility], [facility], [address]   819 [facility], [facility], [address]  Phone: [phone]      Phone: [phone]    [facility] [place]     1896 [facility]  1430 [facility], [place], [address], [place], [address]  Phone:  [phone]     Phone: [phone]    [address] [place] [address], [address]  Phone: [phone]    Sincerely,    Jean Aleman RN

## 2020-03-06 NOTE — TELEPHONE ENCOUNTER
----- Message from Jean Pederson MD sent at 3/6/2020  8:29 AM EST -----  [facility] RN staff, I've reviewed this Step 1 Sequential Screen result which is normal, can you call her regarding this result, and instruct her on step 2? Thank you    Sawyer Blank MD

## 2020-03-06 NOTE — TELEPHONE ENCOUNTER
Spoke with pt and provided her with the results of the part 1 Sequential Screen  Part 2 discussed, pt receptive and declines questions at this time  TRF mailed

## 2020-03-11 ENCOUNTER — ROUTINE PRENATAL (OUTPATIENT)
Dept: OBGYN CLINIC | Facility: CLINIC | Age: 30
End: 2020-03-11

## 2020-03-11 VITALS — SYSTOLIC BLOOD PRESSURE: 99 MMHG | WEIGHT: 150 LBS | DIASTOLIC BLOOD PRESSURE: 62 MMHG | BODY MASS INDEX: 24.96 KG/M2

## 2020-03-11 DIAGNOSIS — G44.219 EPISODIC TENSION-TYPE HEADACHE, NOT INTRACTABLE: Primary | ICD-10-CM

## 2020-03-11 PROCEDURE — 99213 OFFICE O/P EST LOW 20 MIN: CPT | Performed by: OBSTETRICS & GYNECOLOGY

## 2020-03-11 PROCEDURE — 1036F TOBACCO NON-USER: CPT | Performed by: OBSTETRICS & GYNECOLOGY

## 2020-03-11 RX ORDER — ACETAMINOPHEN 500 MG
500 TABLET ORAL EVERY 6 HOURS PRN
Qty: 30 TABLET | Refills: 0 | Status: SHIPPED | OUTPATIENT
Start: 2020-03-11 | End: 2020-05-06

## 2020-03-11 NOTE — PROGRESS NOTES
Juan Kimbrough presents today for routine OB visit at 14w5d  Blood Pressure: 99/62  Wt=68 kg (150 lb); Body mass index is 24 96 kg/m² ; TWG=0 907 kg (2 lb)   ; She reports headaches relieved by tylenol  Denies uterine contractions or persistent cramping  Denies vaginal bleeding or leaking of fluid  Scheduled for next ultrasound 04-30-20  Reviewed common discomforts of pregnancy in second trimester and warning signs  Advised to continue prenatal vitamins and return in 4 weeks      G2 Problems (from 02/05/20 to present)     Problem Noted Resolved    Fetal ultrasound 2/12/2020 by CESAR Rodriguez No    Overview Addendum 3/2/2020  1:31 PM by Mohinder Rowe [place]     1/29/20 (8w5d) EDC confirmed  2/26/20 (12w5d) NT WNL         Genetic screening 2/12/2020 by CESAR Rodriguez No    Overview Signed 2/12/2020 12:48 PM by CESAR Rodriguez     Desires Sequential Screen - scheduled for 2/26/20

## 2020-04-08 ENCOUNTER — TELEMEDICINE (OUTPATIENT)
Dept: OBGYN CLINIC | Facility: CLINIC | Age: 30
End: 2020-04-08

## 2020-04-08 DIAGNOSIS — Z3A.18 18 WEEKS GESTATION OF PREGNANCY: Primary | ICD-10-CM

## 2020-04-08 DIAGNOSIS — Z34.92 PRENATAL CARE IN SECOND TRIMESTER: ICD-10-CM

## 2020-04-08 PROCEDURE — PNV: Performed by: NURSE PRACTITIONER

## 2020-04-13 ENCOUNTER — APPOINTMENT (OUTPATIENT)
Dept: LAB | Facility: HOSPITAL | Age: 30
End: 2020-04-13
Payer: COMMERCIAL

## 2020-04-13 ENCOUNTER — TRANSCRIBE ORDERS (OUTPATIENT)
Dept: ADMINISTRATIVE | Facility: HOSPITAL | Age: 30
End: 2020-04-13

## 2020-04-13 DIAGNOSIS — Z36.9 UNSPECIFIED ANTENATAL SCREENING: ICD-10-CM

## 2020-04-13 DIAGNOSIS — Z33.1 PREGNANT STATE, INCIDENTAL: ICD-10-CM

## 2020-04-13 DIAGNOSIS — Z33.1 PREGNANT STATE, INCIDENTAL: Primary | ICD-10-CM

## 2020-04-13 PROCEDURE — 36415 COLL VENOUS BLD VENIPUNCTURE: CPT

## 2020-04-14 LAB — SCAN RESULT: NORMAL

## 2020-04-29 ENCOUNTER — TELEPHONE (OUTPATIENT)
Dept: PERINATAL CARE | Facility: OTHER | Age: 30
End: 2020-04-29

## 2020-04-30 ENCOUNTER — ROUTINE PRENATAL (OUTPATIENT)
Dept: PERINATAL CARE | Facility: OTHER | Age: 30
End: 2020-04-30
Payer: COMMERCIAL

## 2020-04-30 VITALS
HEIGHT: 65 IN | WEIGHT: 159.4 LBS | DIASTOLIC BLOOD PRESSURE: 68 MMHG | TEMPERATURE: 98.3 F | HEART RATE: 94 BPM | BODY MASS INDEX: 26.56 KG/M2 | SYSTOLIC BLOOD PRESSURE: 104 MMHG

## 2020-04-30 DIAGNOSIS — Z3A.21 21 WEEKS GESTATION OF PREGNANCY: ICD-10-CM

## 2020-04-30 DIAGNOSIS — Z36.3 ENCOUNTER FOR ANTENATAL SCREENING FOR MALFORMATION USING ULTRASOUND: Primary | ICD-10-CM

## 2020-04-30 PROCEDURE — 76805 OB US >/= 14 WKS SNGL FETUS: CPT | Performed by: OBSTETRICS & GYNECOLOGY

## 2020-05-06 ENCOUNTER — ROUTINE PRENATAL (OUTPATIENT)
Dept: OBGYN CLINIC | Facility: CLINIC | Age: 30
End: 2020-05-06

## 2020-05-06 VITALS
DIASTOLIC BLOOD PRESSURE: 70 MMHG | SYSTOLIC BLOOD PRESSURE: 115 MMHG | WEIGHT: 159.4 LBS | TEMPERATURE: 99.4 F | BODY MASS INDEX: 26.53 KG/M2 | HEART RATE: 101 BPM

## 2020-05-06 DIAGNOSIS — Z3A.22 22 WEEKS GESTATION OF PREGNANCY: Primary | ICD-10-CM

## 2020-05-06 PROBLEM — Z30.9 CONTRACEPTION MANAGEMENT: Status: ACTIVE | Noted: 2020-05-06

## 2020-05-06 PROCEDURE — PNV: Performed by: NURSE PRACTITIONER

## 2020-06-03 ENCOUNTER — TELEPHONE (OUTPATIENT)
Dept: OBGYN CLINIC | Facility: CLINIC | Age: 30
End: 2020-06-03

## 2020-06-04 ENCOUNTER — TELEMEDICINE (OUTPATIENT)
Dept: OBGYN CLINIC | Facility: CLINIC | Age: 30
End: 2020-06-04

## 2020-06-04 DIAGNOSIS — Z3A.26 26 WEEKS GESTATION OF PREGNANCY: Primary | ICD-10-CM

## 2020-06-04 PROCEDURE — PNV: Performed by: OBSTETRICS & GYNECOLOGY

## 2020-06-15 ENCOUNTER — APPOINTMENT (OUTPATIENT)
Dept: LAB | Facility: HOSPITAL | Age: 30
End: 2020-06-15
Payer: COMMERCIAL

## 2020-06-15 DIAGNOSIS — Z3A.26 26 WEEKS GESTATION OF PREGNANCY: ICD-10-CM

## 2020-06-15 LAB
BASOPHILS # BLD AUTO: 0.1 THOUSAND/UL (ref 0–0.1)
BASOPHILS NFR MAR MANUAL: 1 % (ref 0–1)
ERYTHROCYTE [DISTWIDTH] IN BLOOD BY AUTOMATED COUNT: 13.5 %
GLUCOSE 1H P 50 G GLC PO SERPL-MCNC: 94 MG/DL
HCT VFR BLD AUTO: 33.7 % (ref 36–46)
HGB BLD-MCNC: 11.6 G/DL (ref 12–16)
LYMPHOCYTES # BLD AUTO: 1.96 THOUSAND/UL (ref 0.5–4)
LYMPHOCYTES # BLD AUTO: 20 % (ref 25–45)
MCH RBC QN AUTO: 30.7 PG (ref 26–34)
MCHC RBC AUTO-ENTMCNC: 34.3 G/DL (ref 31–36)
MCV RBC AUTO: 90 FL (ref 80–100)
METAMYELOCYTES NFR BLD MANUAL: 1 % (ref 0–1)
MONOCYTES # BLD AUTO: 0.98 THOUSAND/UL (ref 0.2–0.9)
MONOCYTES NFR BLD AUTO: 10 % (ref 1–10)
MYELOCYTES NFR BLD MANUAL: 2 % (ref 0–1)
NEUTS SEG # BLD: 6.47 THOUSAND/UL (ref 1.8–7.8)
NEUTS SEG NFR BLD AUTO: 66 %
PLATELET # BLD AUTO: 201 THOUSANDS/UL (ref 150–450)
PLATELET BLD QL SMEAR: ADEQUATE
PMV BLD AUTO: 8.2 FL (ref 8.9–12.7)
RBC # BLD AUTO: 3.76 MILLION/UL (ref 4–5.2)
RBC MORPH BLD: NORMAL
TOTAL CELLS COUNTED SPEC: 100
WBC # BLD AUTO: 9.8 THOUSAND/UL (ref 4.5–11)

## 2020-06-15 PROCEDURE — 85027 COMPLETE CBC AUTOMATED: CPT

## 2020-06-15 PROCEDURE — 36415 COLL VENOUS BLD VENIPUNCTURE: CPT

## 2020-06-15 PROCEDURE — 82950 GLUCOSE TEST: CPT

## 2020-06-15 PROCEDURE — 86592 SYPHILIS TEST NON-TREP QUAL: CPT

## 2020-06-15 PROCEDURE — 85007 BL SMEAR W/DIFF WBC COUNT: CPT

## 2020-06-16 LAB — RPR SER QL: NORMAL

## 2020-06-25 ENCOUNTER — ROUTINE PRENATAL (OUTPATIENT)
Dept: OBGYN CLINIC | Facility: CLINIC | Age: 30
End: 2020-06-25

## 2020-06-25 VITALS
HEART RATE: 84 BPM | WEIGHT: 172 LBS | DIASTOLIC BLOOD PRESSURE: 65 MMHG | BODY MASS INDEX: 28.66 KG/M2 | HEIGHT: 65 IN | SYSTOLIC BLOOD PRESSURE: 99 MMHG | TEMPERATURE: 98 F

## 2020-06-25 DIAGNOSIS — Z3A.29 29 WEEKS GESTATION OF PREGNANCY: Primary | ICD-10-CM

## 2020-06-25 DIAGNOSIS — Z34.93 PRENATAL CARE IN THIRD TRIMESTER: ICD-10-CM

## 2020-06-25 PROCEDURE — PNV: Performed by: NURSE PRACTITIONER

## 2020-06-25 PROCEDURE — 90471 IMMUNIZATION ADMIN: CPT | Performed by: NURSE PRACTITIONER

## 2020-06-25 PROCEDURE — 90715 TDAP VACCINE 7 YRS/> IM: CPT | Performed by: NURSE PRACTITIONER

## 2020-06-25 PROCEDURE — 1036F TOBACCO NON-USER: CPT | Performed by: NURSE PRACTITIONER

## 2020-07-08 ENCOUNTER — TELEPHONE (OUTPATIENT)
Dept: OBGYN CLINIC | Facility: CLINIC | Age: 30
End: 2020-07-08

## 2020-07-09 ENCOUNTER — ROUTINE PRENATAL (OUTPATIENT)
Dept: OBGYN CLINIC | Facility: CLINIC | Age: 30
End: 2020-07-09

## 2020-07-09 VITALS
BODY MASS INDEX: 28.79 KG/M2 | WEIGHT: 172.8 LBS | TEMPERATURE: 97.4 F | DIASTOLIC BLOOD PRESSURE: 75 MMHG | HEART RATE: 96 BPM | HEIGHT: 65 IN | SYSTOLIC BLOOD PRESSURE: 117 MMHG

## 2020-07-09 DIAGNOSIS — Z3A.31 31 WEEKS GESTATION OF PREGNANCY: Primary | ICD-10-CM

## 2020-07-09 DIAGNOSIS — Z34.93 PRENATAL CARE IN THIRD TRIMESTER: ICD-10-CM

## 2020-07-09 PROCEDURE — 3008F BODY MASS INDEX DOCD: CPT | Performed by: OBSTETRICS & GYNECOLOGY

## 2020-07-09 PROCEDURE — 99215 OFFICE O/P EST HI 40 MIN: CPT | Performed by: NURSE PRACTITIONER

## 2020-07-09 PROCEDURE — 1036F TOBACCO NON-USER: CPT | Performed by: NURSE PRACTITIONER

## 2020-07-09 NOTE — PATIENT INSTRUCTIONS
The Third Trimester  (28-42 weeks)  YOUR BABY   * your baby sucks its thumb now! * your baby can hear voices and respond to touch   so talk to him or her!!   * your babys brain grows and develops most in the last 2 months of pregnancy   * babys head and bones are soft and flexible so they can fit through the birth canal   * babys movements change towards the end of pregnancy because there is less room for kicking and stretching in your belly   * babys lungs are not fully developed and completely ready to breathe on their own until the last 3-4 weeks before your due date    YOUR BODY   * your belly is growing a lot now   * it may become more difficult to sleep well at night or to be as active as you usually are   * you may sweat more than usual   * you will become more off-balancebe careful not to fall!!   * you may develop hemorrhoids (which can be painful and make it difficult to sit down)   * the last two months of pregnancy can become very uncomfortablewith backaches, headaches, and heartburn   * you can start to have contractions  as long as they are irregular and less than 5 per hour, this is a normal part of your body getting ready to have a baby   * your cervix may start to thin out and open upto get ready for delivery   * you may find yourself needing to pee very often  because baby is pressing on your bladder so much   * you may get out of breathe more quickly than usual      FETAL KICK COUNTS    In the third trimester (after 28 weeks gestation) you should be performing fetal kick counts every day  Your baby should move at least 10 times in 2 hours during an active time, once a day  Choose atime of day when your baby is most active  Try to do this around the same time each day  Get into a comfortable position and then write down the time your baby first moves  Count each movement until the baby moves 10 times  These movements include kicks, punches, nudges, flutters, or rolls    This can take anywhere from 5 minutes to 2 hours  Write down the time you feel the baby's 10th movement  If 2 hours has passed and your baby has not moved at least 10 times, you should CALL THE OFFICE RIGHT AWAY  [phone]  PREMATURE LABOR     When to call [phone]:  * I need to call immediately if I have even a small amount of LIQUID leaking from my vagina, with or without contractions  * I need to call if I am BLEEDING from my vagina  * I need to call if I am feeling CRAMPING that continues after drinking 2-3 glasses of water and lying down on my side for one hour and that feels like I am having a period  * I need to call if I feel CONTRACTIONS  more than 4 times in an hour that feels like the baby is balling up even after I try drinking 2-3 glasses of water and lying down on my side for an hour  * I need to call if I notice a change in my vaginal DISCHARGE  * I need to call if I am feeling PELVIC PRESSURE  that feels like the baby is pushing down into my vagina and lasts more than an hour  * I need to call if I have LOW BACKACHE which is new and near my tailbone  It may either come and go several times during an hour or stay there constantly  PRE-ECLAMPSIA     What is it? Pre-eclampsia is a serious disease that can occur during pregnancy related to high blood pressures  It can happen to any woman  Why should I care? Women who develop pre-eclampsia have serious risks which can include seizures, stroke, organ damage, premature birth of their baby  In the very worst cases, it can cause death of the mother and/or their baby  What should I pay attention to?    Signs and symptoms of pre-eclampsia can include:   * Severe swelling of face or hands    * A headache that will not go away even after you have taken Tylenol   * Seeing spots or changes in eyesight    * Pain in the upper abdomen or shoulder    * New nausea and vomiting (in the second half of pregnancy)    * Sudden weight gain    * Difficulty breathing     What should I do? If you experience any of the above symptoms of pre-eclampsia, contact your OB provider  Finding pre-eclampsia early is important for you and your baby  Call us at [phone]  [facility] BREASTFEEDING     BENEFITS FOR BABIES   * stronger immune systems (less allergies, eczema, asthma, and childhood cancers)   * less diarrhea and constipation or other GI diseases   * fewer colds and ear infections   * better vision and teeth (fewer cavities)   * improves IQ   * lower rates of diabetes and obesity in childhood     BENEFITS FOR MOMS   * promotes faster weight loss after delivery   * lower risk for postpartum depression   * lower risk for breast, uterine, and ovarian cancers   * lower risk for osteoporosis developing with age   * easier than formula - is always right with you, clean, and the right temperature   * less expensive than formulaits FREE !!!!     KEYS TO SUCCESSFUL BREASTFEEDING   * keep baby skin-to-skin until after first feeding event   * keep baby in your room with you during your hospital stay after delivery   * avoid any bottle feedings (unless medically necessary)   * limit the use of pacifiers and swaddling   * ask for help if you are having any issueslactation consultants (who specialize in breastfeeding) are available to help you   * a healthy diet for momeating a variety of foods and portions in moderation    THINGS YOU SHOULD KNOW ABOUT BREASTFEEDING   * most medications are considered compatible with breastfeeding by the [address] [place] [address] Academy of Pediatrics, but you should check with your health care provider or lactation consultant prior to taking a new medicationjust to be sure it is safe   * alcohol (beer, wine, liquor) can be passed from mother to baby through breast milkan occasional, social drink is deemed acceptable by the American Academy of [place] [address]   more than that should be avoided   * breastfeeding is NOT an effective method of birth control   * nicotine (in cigarettes) can pass from mother to baby through breast milk   however, for mothers who smoke, it is still healthier to breastfeed than use formula   * caffeine should be limited to 1-3 cups per dayincludes coffee, soda, energy drinks         PERINEAL / VAGINAL MASSAGE    What can I do now to decrease my chances of tearing during delivery? Massaging around the vaginal opening by you (or your partner), either antepartum (before birth) or during the second stage of labor, can reduce the likelihood of perineal tearing during childbirth  Likewise, the use of warm packs held on the perineum during the pushing stage of labor can reduce the severity of your tear  This will happen during the pushing stage of labor  At home, you can also help reduce the chances of injury that may occur during the birth of your child through perineal massage  When should I do this? Starting around or shortly after 34 weeks of pregnancy, you or your partner should provide 5-10 minutes of vaginal massage 1-4 times per week  How? Use either almond, coconut, or olive oil and water mixture on 1 or 2 fingers (depending on comfort)  Insert finger(s) 3-5cm into the vagina  Apply sweeping downward/sideward pressure from 3 to 9 o'clock for 5-10 minutes, 1-4 times per week  WARNING SIGNS DURING PREGNANCY  Call our office at [phone] if you experience any of the followin  Vaginal bleeding  2  Sharp abdominal pain that does not go away  3  Fever (more than 100 4 and is not relieved by Tylenol)  4  Persistent vomiting lasting greater than 24 hours  5  Chest pain   6  Pain or burning when you urinate  7  Severe headache that doesn't resolve with Tylenol  8  Blurred vision or seeing spots in your vision  9  Sudden swelling of your face or hands  10  Redness, swelling or pain in a leg  11  A sudden weight gain in just a few days  12   Decrease in your baby's movement (after 28 weeks or the 6th month of pregnancy)  13  A loss of watery fluid from your vagina - can be a gush, a trickle or continuous wetness  14  After 20 weeks of pregnancy, rhythmic cramping (greater than 4 per hour) or menstrual like low/pelvic pain          VACCINES IN PREGNANCY    TDAP  Whopping cough (or pertusSsis) can be serious for anyone, but for your , it can be life-threatning  Up to 20 babies die each year in the [place]  Due to whopping cough  About half of babies younger than 3year old who get whopping cough need treatment in the hospital   The younger the baby is when he or she gets whopping cough, the more likely he or she will need to be treated in a hospital   When you receive the whopping cough vaccine (Tdap) during your pregnancy, your body will create protective antibodies and pass some of them to your baby before birth  These antibodies can help protect your baby from getting whopping cough until they are old enough to be vaccinated themselves (usually around 7 months of age)  INFLUENZA  Changes in your immune, heart, and lung functions during pregnancy make you more likely to get seriously ill from the flu  Catching the flu also increases your chances for serious problems for your developing baby, including premature labor and delivery  It is recommended that all women who are pregnant during flu season should receive an influenza vaccine  Coronavirus (RZYKV-71) pregnancy FAQs: Medical experts answer your questions  From [employer] cy  com/0_coronavirus-covid-19-pregnancy-faq-medical-itoiwbv-lduumo-hx_87001236  bc (courtesy of [facility])  As of 3/18/20  By Connie Salmon 39  Medically reviewed by Society for Maternal-Fetal Medicine       We asked parents-to-be to send us their most pressing questions about coronavirus (COVID-19)  Among them: Is it still safe to deliver in a hospital? What if my ob-gyn has the virus?  We sent those questions to the top docs at the Society for Maternal-Fetal Medicine  Here are their answers  The coronavirus (COVID-19) pandemic has been declared a national emergency in the [facility] by [employer]  Moms-to-be like you are concerned about everything from getting medicines to managing disruptions at work  But above and beyond any worry about lifestyle changes is a focus on your health and the impact of COVID-19 on your pregnancy  We asked obstetrics doctors who handle the most complicated pregnancy issues to answer your questions about the coronavirus  Here are their responses, provided by Dr Taz Abdi and her colleagues at the Society for [place] [address]  Am I at more risk for COVID-19 if I'm pregnant? We don't know  Pregnancy does change your immune system in ways that might make you more susceptible to viral respiratory infections like COVID-19  If you become infected, you might also be at higher risk for more severe illness compared to the general population  Although this does not appear to be the case with COVID-19, based on limited data so far, a higher risk has been true for pregnant women with other coronavirus infections (SARS-CoV and MERS-CoV) and other viral respiratory infections, such as flu  It's important to take preventive actions to avoid infection, such as washing your hands often and avoiding people who are sick  How might coronavirus affect my pregnancy? Again, we don't know  Women with other coronavirus infections (such as SARS-CoV) did not have miscarriage or stillbirth at higher rates than the general population  We know that having other respiratory viral infections during pregnancy, such as flu, has been associated with problems like low birth weight and  birth  Also, having a high fever early in pregnancy may increase the risk of certain birth defects  Could I transmit coronavirus to my baby during pregnancy or delivery?   We don't know whether you could transmit COVID-19 to your baby before or during delivery  However, among the few case studies of infants born to mothers with COVID-23 published in peer-reviewed literature, none of the infants tested positive for the virus  Also, there was no virus detected in samples of amniotic fluid or breast milk  There have been a few reports of newborns as young as a few days old with infection, suggesting that a mother can transmit the infection to her infant through close contact after delivery  There have been no reports of mother-to-baby transmission for other coronaviruses (MERS-CoV and SARS-CoV), although only limited information is available  Is it safe for me to deliver at a hospital where there have been COVID-19 cases? Yes  We know that COVID-19 is a very scary virus  The good news is that hospitals are taking great precautions to keep patients and healthcare providers safe  When a patient is even suspected to have COVID-19, they are placed in a negative pressure room  (Think of these rooms as vacuums that suck and filter the air so it's safe for the other people in the hospital)  This makes it possible for you to deliver at the hospital without putting you or your baby at risk  Hospitals are also implementing stricter visiting policies to keep patients safe  It's worth calling your hospital to check if there are any new regulations to be aware of  What plans should I make now in case the hospital system is overwhelmed when it's time for me to deliver? This is a great question to talk with your doctor or midwife about when you're 34 to 36 weeks pregnant  Every hospital is making different plans for dealing with this scenario  I work in healthcare  Should I ask my doctor to excuse me from work until the baby is born? What if I work in a school, the travel [address], or some other high-risk setting?   Healthcare facilities should take care to limit the exposure of pregnant employees to patients with confirmed or suspected COVID-19, just as they would with other infectious cases  If you continue working, be sure to follow the CDC's risk assessment and infection control guidelines  If you work in a school, travel [employer], or other high-risk setting, talk with your employer about what it's doing to protect employees and minimize infection risks  Wash your hands often  What if my OB gets COVID-19? If your doctor or midwife tests positive for COVID-19, they will need to be quarantined until they recover and are no longer at risk of transmitting the virus  In this case, you'll be assigned to another OB in your doctor's practice (or you may choose another practitioner yourself)  Ask your new OB or your doctor's office if you should self-quarantine or be tested for the virus  (It will depend on when you last saw your provider and when that person tested positive )    Should we hold off on trying to conceive because of COVID-19? At this time, there's no reason to hold off on trying to get pregnant, but the data we have is really limited  For example, we don't think the virus causes birth defects or increases your risk of miscarriage  But we don't know whether you could transmit COVID-19 to your baby before or during delivery  We also don't know if the virus lives in semen or can be sexually transmitted  We have a babymoon scheduled in the next few months - should we cancel? If you're planning to travel internationally or on a cruise, you should strongly consider canceling  At this time, the virus has reached more than 140 countries, and there are travel bans to [place], most of [place], and [place]  Places where large numbers of people gather are at highest risk, especially airports and cruise ships  If you're planning travel in the [place] , note that any travel setting increases your risk of exposure, and there may be travel bans even in [place] by the time you're scheduled to go   Even if you're state is not blocked, you'll definitely want to avoid traveling to communities where the virus is spreading  To find out where these places are, check The [place] Times map based on CDC data  For the most current advice to help you avoid exposure, check the CDC's COVID-19 travel page  Will the hospital separate me from my  and keep the baby in quarantine? If you test positive for COVID-19 or have been exposed but have no symptoms, the CDC, [employer] of Obstetricians and Gynecologists, and the Society for [place] 250 all recommend that you be  from your baby to decrease the risk of transmission to the baby  This would last until you are no longer at risk of transmitting the virus  If you do not have COVID-19 and have not been exposed to the virus, the hospital will not separate you from your   My hospital is restricting visitors and only allowing one support person  If my support person leaves after the delivery, will they be allowed to come back? Every hospital has different policies  Contact your hospital or labor and delivery unit a week or so before delivery to get the most up-to-date restrictions  In general, if your support person needs to leave, they would be allowed back unless they knew they were exposed to COVID-19 after leaving your company  [facility] understands that the coronavirus (COVID-19) pandemic is an evolving story and that your questions will change over time  We'll continue asking moms and dads in our Community what they want to know, and we'll get the answers from experts to keep them - and you - informed and supported  My mom was planning to fly here to help me care for my new baby after delivery  Should I tell her not to come? Yes  If your mom is over 61 or has any serious chronic medical conditions (such as heart disease, lung disease, or diabetes), she is at higher risk of serious illness from COVID-19 and should avoid air travel   And remember that any travel setting increases a person's risk of exposure  So, it may be risky to have her around the baby after she has been traveling  For the most current advice on traveling, check the CDC's COVID-19 travel page  [facility] understands that the coronavirus pandemic is an evolving story and that your questions will change over time  We'll continue asking moms and dads in our Community what they want to know, and we'll get the answers from experts to keep them - and you - informed and supported      REturn in 2 weeks

## 2020-07-09 NOTE — PROGRESS NOTES
Assessment & Plan  27 y o  Antonio Paz at [address] presenting for routine prenatal visit  WNL respiratory effort  Negative cough, fever or SOB    Problem List Items Addressed This Visit        Other    31 weeks gestation of pregnancy - Primary    Prenatal care in third trimester        ____________________________________________________________  Subjective  She is without complaint  She denies contractions, loss of fluid, or vaginal bleeding  She feels regular fetal movements      Objective  /75   Pulse 96   Temp (!) 97 4 °F (36 3 °C)   Ht 5' 5" (1 651 m)   Wt 78 4 kg (172 lb 12 8 oz)   LMP  (LMP Unknown)   BMI 28 76 kg/m²          Patient's Active Problem List  Patient Active Problem List   Diagnosis    Fetal ultrasound    Genetic screening    Contraception management    31 weeks gestation of pregnancy    Prenatal care in third trimester     Plan  To call with vaginal bleeding, loss of fluid, regular contractions, decreased fetal movement, other needs or concerns  Return in 2 weeks  Pt verbalized understanding of all discussed

## 2020-07-23 ENCOUNTER — ROUTINE PRENATAL (OUTPATIENT)
Dept: OBGYN CLINIC | Facility: CLINIC | Age: 30
End: 2020-07-23

## 2020-07-23 VITALS
SYSTOLIC BLOOD PRESSURE: 114 MMHG | BODY MASS INDEX: 29.35 KG/M2 | WEIGHT: 176.4 LBS | DIASTOLIC BLOOD PRESSURE: 74 MMHG | TEMPERATURE: 98.2 F

## 2020-07-23 DIAGNOSIS — Z3A.33 33 WEEKS GESTATION OF PREGNANCY: Primary | Chronic | ICD-10-CM

## 2020-07-23 PROCEDURE — 1036F TOBACCO NON-USER: CPT | Performed by: OBSTETRICS & GYNECOLOGY

## 2020-07-23 PROCEDURE — PNV: Performed by: OBSTETRICS & GYNECOLOGY

## 2020-07-23 NOTE — PROGRESS NOTES
OB/GYN prenatal visit    S: 27 y o  Viraj Mcfarland here for PN visit  She has no obstetric complaints, including pelvic pain, contractions, vaginal bleeding, loss of fluid, or decreased fetal movement       COVID19 Screen:    Cough: no  Fever: no  Shortness of breath: no  Known exposures to COVID-19, or international travel:no    O:  Vitals:    07/23/20 0926   BP: 114/74   Temp: 98 2 °F (36 8 °C)       Gen: no acute distress, nonlabored breathing  Fundal Height (cm): 32 cm  Fetal Heart Rate: 138    A/P:    Problem List Items Addressed This Visit        Other    33 weeks gestation of pregnancy - Primary (Chronic)      33 weeks gestation of pregnancy  Patient is doing well, previously term vaginal delivery with uncomplicated pregnancy course   Labor precautions discussed   1h Glucola 94 normal  Contraception Sterilization; MA 31 signed and we rechecked  PAP smear negative for intraepithelial neoplasia 1/29/2020        34 weeks: perineal massage card, contraception and birth plan, Tanmay Garcia MD  1/38/8417  0:50 AM

## 2020-07-23 NOTE — ASSESSMENT & PLAN NOTE
Patient is doing well, previously term vaginal delivery with uncomplicated pregnancy course   Labor precautions discussed   1h Glucola 94 normal  Contraception Sterilization; MA 31 signed and we rechecked  PAP smear negative for intraepithelial neoplasia 1/29/2020

## 2020-08-05 ENCOUNTER — TELEPHONE (OUTPATIENT)
Dept: OBGYN CLINIC | Facility: CLINIC | Age: 30
End: 2020-08-05

## 2020-08-06 ENCOUNTER — ROUTINE PRENATAL (OUTPATIENT)
Dept: OBGYN CLINIC | Facility: CLINIC | Age: 30
End: 2020-08-06

## 2020-08-06 VITALS
HEART RATE: 93 BPM | SYSTOLIC BLOOD PRESSURE: 109 MMHG | TEMPERATURE: 97.4 F | WEIGHT: 177 LBS | BODY MASS INDEX: 29.45 KG/M2 | DIASTOLIC BLOOD PRESSURE: 71 MMHG

## 2020-08-06 DIAGNOSIS — Z3A.35 35 WEEKS GESTATION OF PREGNANCY: Primary | ICD-10-CM

## 2020-08-06 PROCEDURE — PNV: Performed by: NURSE PRACTITIONER

## 2020-08-06 NOTE — PROGRESS NOTES
Mike Lujan presents today for routine OB visit at 35w6d  Blood Pressure: 109/71  Wt=80 3 kg (177 lb); Body mass index is 29 45 kg/m² ; TWG=13 2 kg (29 lb)  Fetal Heart Rate: 153; Fundal Height (cm): 35 cm  Abdomen: gravid, soft, non-tender  She reports no complaints  Denies uterine contractions  Denies vaginal bleeding or leaking of fluid  Reports adequate fetal movement of at least 10 movements in 2 hours once daily  Third trimester bloodwork completed  Vaccinations: completed  No further ultrasounds are scheduled or indicated at this time  Reviewed premature labor precautions and fetal kick counts  Advised to continue medications and return in 1 week  Current Outpatient Medications:     prenatal vitamin (CLASSIC FORMULA) 28-0 8 mg, Take 1 tablet by mouth daily with breakfast, Disp: 90 tablet, Rfl: 3      G2 Problems (from 02/05/20 to present)     Problem Noted Resolved    Contraception management 5/6/2020 by CESAR Almanza No    Overview Addendum 8/6/2020 10:04 AM by CESAR Almanza     Reviewed with patient surgical sterilization is PERMANENT STERILIZATION and NOT REVERSIBLE  Discussed other temporary contraceptive options (including LARC's)  Reviewed risks & benefits of surgical sterilization  Patient verbally rejects other options and desires surgical sterilization  Sterilization consent (MA 31 form) signed 6/25/2020           Fetal ultrasound 2/12/2020 by CESAR Almanza No    Overview Addendum 5/6/2020 11:37 AM by CESAR Almanza     1/29/20 (8w5d) EDC confirmed  2/26/20 (12w5d) NT WNL  4/30/20 (21w6d) no previa, antonio WNL & complete         Genetic screening 2/12/2020 by CESAR Almanza No    Overview Addendum 5/6/2020 11:37 AM by CESAR Almanza     Sequential Screen negative

## 2020-08-06 NOTE — PATIENT INSTRUCTIONS
HILTON DE PARTO   Llame a Meriam Quill al [phone] para cualquiera de los siguientes:    * Necesito llamar inmediatamente yo si tengo incluso rajiv pequeña cantidad de LIQUIDO se escapa de mi vagina, con o sin contracciones  * Jennifer llamar si tengo SANGRADO rajiv cantidad igual o más que un período  Seretha Xavier cantidad de flujo vaginal sangriento es normal al final del embarazo  * Jennifer llamar si tengo CONTRACCIONES cada kristyn minutos jeannine al [employer]  Necesito un reloj para tiempo  Yo jennifer tiempo desde el comienzo de rajiv contracción hasta el comienzo de la siguiente  * De ser necesario ANTES DE  ir al hospital    * Necesito tener un plan para TRANSPORTE a ir al hospital cuando estoy en [place]  Trabajo generalmente no es rajiv emergencia que requiere rajiv ambulancia  CUENTAS DEL RETROCESO FETAL    En el tercer trimestre (después de 28 semanas de gestación) deben realizar patada fetal cuentas cada día  Patel bebé debe moverse al menos 10 veces en 2 horas jeannine un tieo [place], rajiv vez al día  Elegir el atime del día cuando el bebé es [place]  Trate de hacerlo a la misma hora cada día  Entrar en rajiv posición cómoda y luego escribir el tiempo que tu bebé se mueve deo  Cuenta cada movimiento hasta que el bebé se mueve 10 veces  Estos movimientos incluyen patadas, puñetazos, codazos, revolotea o rollos  [place] puede tardar entre 5 minutos a 2 horas  Anote el tiempo que sientes movimiento 10 del bebé  Si ha pasado 2 horas y tu bebé no se ha movido al menos 10 veces, usted debe llamar a la oficina de inmediato  [phone]          MASAJE EN LA FAWN PERINEAL O VAGINAL    Que puedo hacer ahora para reducir las probabilidades de desgarro jeannine el parto? Masaje alrededor del orificio de la vagina realizado por usted misma (o por patel elian)    El masaje en esta fawn, ya sea antes del parto o jeannine la segunda etapa del parto, [place] reducir la probabilidad de desgarro perineal jeannine el parto  Asimismo, el uso de compresas tibias en el perineo jeannine la etapa expulsiva del parto puede reducir la pravedad del desparro  [place] sucedera jeannine la etapa expulsiva del parto  En casa tambien puede reducir las probabilidades de sufrir lesiones durnate el parto de con masajes en la penny perineal     [place]? Todas las mujeres embarazadas a partir de, [place], las 34 semanas de [facility]  Cuando? Usted o patel elian deben hacer masajes en la penny vaginal jeannine 5 a 10 minutos de 1 a 4 veces por semana  [place]? Use rajiv mezcla de agua y aceite de [facility], nadir u varela con 1 o 2 dedos (lexis la comodidad)  Inserte los dedos en la vagina entre 3 y 5cm  Aplique presion general hacia abajo y [employer] costados jeannine 5 a [address] 3 y las 9 horas, de 1 a 4 veces por semana  GROUP B STREP    Group B Strep (GBS) es rajiv bacteria vaginal común  Aproximadamente el 25% de las mujeres normalmente tienen la bacteria GBS en la vagina  NO es rajiv infección de transmisión sexual  ¡De hecho, no es rajiv infección! Es solo rajiv bacteria vaginal normal para muchas mujeres  Sin embargo, la bacteria GBS puede ser peligrosas para un bebé recién nacido si el bebé está expuesto a olena bacteria particular jeannine el parto y el nacimiento Y desarrolla rajiv infección de la bacteria  La probabilidad de rajiv infección de GBS en recién nacidos para rajiv sharon que tiene las bacteria GBS en la vagina es cerca de 1% - 2%  Gonzalez si la infeccion produce, un bebé podría ser gravemente enfermo  Por esta razón, hacemos un cultivo vaginal (Q-Tip de la vagina y el recto) para todas las mujeres embarazadas en el aproximadamente 39 semanas de [place]  Si el cultivo demuestra que hay bacteria GBS presente, no es rajiv razón para el pánico! [employer] en esta situación dará ce antibióticos de la sharon a través de patel IV mientras está en parto   Cuando rajiv madre se trata con antibióticos jeannine el [place], [place] bebé MEKA NUNCA se infecta con la bacteria GBS

## 2020-08-12 ENCOUNTER — TELEPHONE (OUTPATIENT)
Dept: OBGYN CLINIC | Facility: CLINIC | Age: 30
End: 2020-08-12

## 2020-08-13 ENCOUNTER — ROUTINE PRENATAL (OUTPATIENT)
Dept: OBGYN CLINIC | Facility: CLINIC | Age: 30
End: 2020-08-13

## 2020-08-13 VITALS
WEIGHT: 179.4 LBS | SYSTOLIC BLOOD PRESSURE: 102 MMHG | DIASTOLIC BLOOD PRESSURE: 67 MMHG | BODY MASS INDEX: 29.85 KG/M2 | TEMPERATURE: 98.4 F | HEART RATE: 85 BPM

## 2020-08-13 DIAGNOSIS — Z3A.36 36 WEEKS GESTATION OF PREGNANCY: Primary | ICD-10-CM

## 2020-08-13 PROCEDURE — 87653 STREP B DNA AMP PROBE: CPT | Performed by: OBSTETRICS & GYNECOLOGY

## 2020-08-13 PROCEDURE — PNV: Performed by: OBSTETRICS & GYNECOLOGY

## 2020-08-13 NOTE — PROGRESS NOTES
OB/GYN prenatal visit    S: 27 y o  [id number] 36w6d here for PN visit  She has no obstetric complaints, including pelvic pain, contractions, vaginal bleeding, loss of fluid, or decreased fetal movement  Patient states she is feeling well  Hx of 1 prior , 7lb7oz  O:  Vitals:    20 1559   BP: 102/67   Pulse: 85   Temp: 98 4 °F (36 9 °C)       Physical Exam  Constitutional:       Appearance: She is well-developed  HENT:      Head: Normocephalic and atraumatic  Cardiovascular:      Rate and Rhythm: Normal rate and regular rhythm  Heart sounds: Normal heart sounds  Pulmonary:      Effort: Pulmonary effort is normal  No respiratory distress  Breath sounds: Normal breath sounds  Abdominal:      General: Bowel sounds are normal       Palpations: Abdomen is soft  Tenderness: There is no abdominal tenderness  Musculoskeletal:      Right lower leg: No edema  Left lower leg: No edema  Neurological:      Mental Status: She is alert and oriented to person, place, and time  Skin:     General: Skin is warm and dry  Psychiatric:         Behavior: Behavior normal    Vitals signs reviewed  Fundal height: 36cm    FHR: 144 bpm via doppler    A/P:      1  GBS collected today  No PCN allergy  2  Birth plan: epidural  3  Contraception: tubal ligation, MA-31 signed  Patient declines bridge at this time  4   Reviewed [facility], labor precuations    RTC 1 week for next prenatal      D/w Dr Eliseo Owen MD   OB/GYN PGY-1  2020  4:43 PM

## 2020-08-13 NOTE — PATIENT INSTRUCTIONS
Pregnancy at 28 to [address]:   You are considered full term at the beginning of 37 weeks  Your breathing may be easier if your baby has moved down into a head-down position  You may need to urinate more often because the baby may be pressing on your bladder  You may also feel more discomfort and get tired easily  DISCHARGE INSTRUCTIONS:   Return to the emergency department if:   · You develop a severe headache that does not go away  · You have new or increased vision changes, such as blurred or spotted vision  · You have new or increased swelling in your face or hands  · You have vaginal spotting or bleeding  · Your water broke or you feel warm water gushing or trickling from your vagina  Contact your healthcare provider if:   · You have more than 5 contractions in 1 hour  · You notice any changes in your baby's movements  · You have abdominal cramps, pressure, or tightening  · You have a change in vaginal discharge  · You have chills or a fever  · You have vaginal itching, burning, or pain  · You have yellow, green, white, or foul-smelling vaginal discharge  · You have pain or burning when you urinate, less urine than usual, or pink or bloody urine  · You have questions or concerns about your condition or care  How to care for yourself at this stage of your pregnancy:   · Eat a variety of healthy foods  Healthy foods include fruits, vegetables, whole-grain breads, low-fat dairy foods, beans, lean meats, and fish  Drink liquids as directed  Ask how much liquid to drink each day and which liquids are best for you  Limit caffeine to less than 200 milligrams each day  Limit your intake of fish to 2 servings each week  Choose fish low in mercury such as canned light tuna, shrimp, crab, salmon, cod, or tilapia  Do not  eat fish high in mercury such as swordfish, tilefish, yovanny mackerel, and shark  · Take prenatal vitamins as directed    Your need for certain vitamins and minerals, such as folic acid, increases during pregnancy  Prenatal vitamins provide some of the extra vitamins and minerals you need  Prenatal vitamins may also help to decrease the risk of certain birth defects  · Rest as needed  Put your feet up if you have swelling in your ankles and feet  · Do not smoke  If you smoke, it is never too late to quit  Smoking increases your risk of a miscarriage and other health problems during your pregnancy  Smoking can cause your baby to be born too early or weigh less at birth  Ask your healthcare provider for information if you need help quitting  · Do not drink alcohol  Alcohol passes from your body to your baby through the placenta  It can affect your baby's brain development and cause fetal alcohol syndrome (FAS)  FAS is a group of conditions that causes mental, behavior, and growth problems  · Talk to your healthcare provider before you take any medicines  Many medicines may harm your baby if you take them when you are pregnant  Do not take any medicines, vitamins, herbs, or supplements without first talking to your healthcare provider  Never use illegal or street drugs (such as marijuana or cocaine) while you are pregnant  · Talk to your healthcare provider before you travel  You may not be able to travel in an airplane after 36 weeks  He may also recommend that you avoid long road trips  Safety tips:   · Avoid hot tubs and saunas  Do not use a hot tub or sauna while you are pregnant, especially during your first trimester  Hot tubs and saunas may raise your baby's temperature and increase the risk of birth defects  · Avoid toxoplasmosis  This is an infection caused by eating raw meat or being around infected cat feces  It can cause birth defects, miscarriages, and other problems  Wash your hands after you touch raw meat  Make sure any meat is well-cooked before you eat it  Avoid raw eggs and unpasteurized milk   Use gloves or ask someone else to clean your cat's litter box while you are pregnant  · Ask your healthcare provider about travel  The most comfortable time to travel is during the second trimester  Ask your healthcare provider if you can travel after 36 weeks  You may not be able to travel in an airplane after 36 weeks  He may also recommend that you avoid long road trips  Changes that are happening with your baby:  By 38 weeks, your baby may weigh between 6 and 9 pounds  Your baby may be about 14 inches long from the top of the head to the rump (baby's bottom)  Your baby hears well enough to know your voice  As your baby gets larger, you may feel fewer kicks and more stretching and rolling  Your baby may move into a head-down position  Your baby will also rest lower in your abdomen  What you need to know about prenatal care: Your healthcare provider will check your blood pressure and weight  You may also need the following:  · A urine test  may also be done to check for sugar and protein  These can be signs of gestational diabetes or infection  Protein in your urine may also be a sign of preeclampsia  Preeclampsia is a condition that can develop during week 20 or later of your pregnancy  It causes high blood pressure, and it can cause problems with your kidneys and other organs  · A blood test  may be done to check for anemia (low iron level)  · A Tdap vaccine  may be recommended by your healthcare provider  · A group B strep test  is a test that is done to check for group B strep infection  Group B strep is a type of bacteria that may be found in the vagina or rectum  It can be passed to your baby during delivery if you have it  Your healthcare provider will take swab your vagina or rectum and send the sample to the lab for tests  · Fundal height  is a measurement of your uterus to check your baby's growth  This number is usually the same as the number of weeks that you have been pregnant   Your healthcare provider may also check your baby's position  · Your baby's heart rate  will be checked  © 2017 [address] Information is for End User's use only and may not be sold, redistributed or otherwise used for commercial purposes  All illustrations and images included in CareNotes® are the copyrighted property of A D A M , Inc  or Juan Antonio Antonio  The above information is an  only  It is not intended as medical advice for individual conditions or treatments  Talk to your doctor, nurse or pharmacist before following any medical regimen to see if it is safe and effective for you

## 2020-08-15 ENCOUNTER — NURSE TRIAGE (OUTPATIENT)
Dept: OTHER | Facility: OTHER | Age: 30
End: 2020-08-15

## 2020-08-15 ENCOUNTER — TELEPHONE (OUTPATIENT)
Dept: OTHER | Facility: OTHER | Age: 30
End: 2020-08-15

## 2020-08-15 LAB — GP B STREP DNA SPEC QL NAA+PROBE: NORMAL

## 2020-08-15 NOTE — TELEPHONE ENCOUNTER
Regarding: Nervous pt, vaginal discharge, 38 wks  Speaks some english but prefers Khmer if possible   ----- Message from Cherie Cockayne, [address] [place] sent at 8/15/2020  4:32 PM EDT -----  "I am calling because I am 38 wks and some white fluid that came out of my vagina   I am not having any pain "

## 2020-08-15 NOTE — TELEPHONE ENCOUNTER
Spoke to Dr Shashank Ellis, and discussed patient's s/s  Stated patient can continue to monitor, no need to be evaluated at this time  Patient informed and verbalized understanding

## 2020-08-15 NOTE — TELEPHONE ENCOUNTER
Reason for Disposition   Abnormal color vaginal discharge (i e , yellow, green, gray)    Answer Assessment - Initial Assessment Questions  1  DISCHARGE: "Describe the discharge " (e g , white, yellow, green, gray, foamy, cottage cheese-like)      Yellowish liquid     2  ODOR: "Is there a bad odor?"      Denies     3  ONSET: "When did the discharge begin?"      30 minutes ago     4  RASH: "Is there a rash in that area?" If so, ask: "Describe it " (e g , redness, blisters, sores, bumps)      Denies     5  ABDOMINAL PAIN: "Are you having any abdominal pain?" If yes: "What does it feel like?" (e g , crampy, dull, intermittent, constant)       Denies     6  ABDOMINAL PAIN SEVERITY: If present, ask: "How bad is it?"  (e g , Scale 1-10; mild, moderate, or severe)    - MILD (1-3): doesn't interfere with normal activities, abdomen soft and not tender to touch     - MODERATE (4-7): interferes with normal activities or awakens from sleep, tender to touch     - SEVERE (8-10): excruciating pain, doubled over, unable to do any normal activities      Denies     7  CAUSE: "What do you think is causing the discharge?"      Unsure     8  OTHER SYMPTOMS: "Do you have any other symptoms?" (e g , fever, itching, vaginal bleeding, pain with urination)      Denies    9  FRANK: "What date are you expecting to deliver?"        September 4th      10   PREGNANCY: "How many weeks pregnant are you?"        37 weeks pregnant, second pregnancy    Protocols used: [facility]

## 2020-08-19 ENCOUNTER — TELEPHONE (OUTPATIENT)
Dept: OBGYN CLINIC | Facility: CLINIC | Age: 30
End: 2020-08-19

## 2020-08-20 ENCOUNTER — ROUTINE PRENATAL (OUTPATIENT)
Dept: OBGYN CLINIC | Facility: CLINIC | Age: 30
End: 2020-08-20

## 2020-08-20 VITALS
TEMPERATURE: 98.4 F | BODY MASS INDEX: 29.99 KG/M2 | SYSTOLIC BLOOD PRESSURE: 117 MMHG | WEIGHT: 180.2 LBS | DIASTOLIC BLOOD PRESSURE: 74 MMHG

## 2020-08-20 DIAGNOSIS — Z3A.37 37 WEEKS GESTATION OF PREGNANCY: Primary | ICD-10-CM

## 2020-08-20 PROCEDURE — PNV: Performed by: NURSE PRACTITIONER

## 2020-08-20 NOTE — PATIENT INSTRUCTIONS
HILTON DE PARTO   Llame a Martin Running al [phone] para cualquiera de los siguientes:    * Necesito llamar inmediatamente yo si tengo incluso rajiv pequeña cantidad de LIQUIDO se escapa de mi vagina, con o sin contracciones  * Jennifer llamar si tengo SANGRADO rajiv cantidad igual o más que un período  Tenny Backers cantidad de flujo vaginal sangriento es normal al final del embarazo  * Jennifer llamar si tengo CONTRACCIONES cada kristyn minutos jeannine al [employer]  Necesito un reloj para tiempo  Yo jennifer tiempo desde el comienzo de rajiv contracción hasta el comienzo de la siguiente  * De ser necesario ANTES DE  ir al hospital    * Necesito tener un plan para TRANSPORTE a ir al hospital cuando estoy en [place]  Trabajo generalmente no es rajiv emergencia que requiere rajiv ambulancia  CUENTAS DEL RETROCESO FETAL    En el tercer trimestre (después de 28 semanas de gestación) deben realizar patada fetal cuentas cada día  Patel bebé debe moverse al menos 10 veces en 2 horas jeannine un [facility], rajiv vez al día  Elegir el atime del día cuando el bebé es [place]  Trate de hacerlo a la misma hora cada día  Entrar en rajiv posición cómoda y luego escribir el tiempo que tu bebé se mueve deo  Cuenta cada movimiento hasta que el bebé se mueve 10 veces  Estos movimientos incluyen patadas, puñetazos, codazos, revolotea o rollos  [place] puede tardar entre 5 minutos a 2 horas  Anote el tiempo que sientes movimiento 10 del bebé  Si ha pasado 2 horas y tu bebé no se ha movido al menos 10 veces, usted debe llamar a la oficina de inmediato  [phone]          MASAJE EN LA FAWN PERINEAL O VAGINAL    Que puedo hacer ahora para reducir las probabilidades de desgarro jeannine el parto? Masaje alrededor del orificio de la vagina realizado por usted misma (o por patel elian)    El masaje en esta fawn, ya sea antes del parto o jeannine la segunda etapa del parto, [place] reducir la probabilidad de desgarro perineal jeannine el parto  Asimismo, el uso de compresas tibias en el perineo jeannine la etapa expulsiva del parto puede reducir la pravedad del desparro  [place] sucedera jeannine la etapa expulsiva del parto  En casa tambien puede reducir las probabilidades de sufrir lesiones durnate el parto de con masajes en la penny perineal     [place]? Todas las mujeres embarazadas a partir de, [place], las 34 semanas de [facility]  Cuando? Usted o patel elian deben hacer masajes en la penny vaginal jeannine 5 a 10 minutos de 1 a 4 veces por semana  [place]? Use rajiv mezcla de agua y aceite de [facility], nadir u varela con 1 o 2 dedos (lexis la comodidad)  Inserte los dedos en la vagina entre 3 y 5cm  Aplique presion general hacia abajo y [employer] costados jeannine 5 a [address] 3 y las 9 horas, de 1 a 4 veces por semana  GROUP B STREP    Group B Strep (GBS) es rajiv bacteria vaginal común  Aproximadamente el 25% de las mujeres normalmente tienen la bacteria GBS en la vagina  NO es rajiv infección de transmisión sexual  ¡De hecho, no es rajiv infección! Es solo rajiv bacteria vaginal normal para muchas mujeres  Sin embargo, la bacteria GBS puede ser peligrosas para un bebé recién nacido si el bebé está expuesto a olena bacteria particular jeannine el parto y el nacimiento Y desarrolla rajiv infección de la bacteria  La probabilidad de rajiv infección de GBS en recién nacidos para rajiv sharon que tiene las bacteria GBS en la vagina es cerca de 1% - 2%  Gonzalez si la infeccion produce, un bebé podría ser gravemente enfermo  Por esta razón, hacemos un cultivo vaginal (Q-Tip de la vagina y el recto) para todas las mujeres embarazadas en el aproximadamente 39 semanas de [place]  Si el cultivo demuestra que hay bacteria GBS presente, no es rajiv razón para el pánico! [employer] en esta situación dará ce antibióticos de la sharon a través de patel IV mientras está en parto   Cuando rajiv madre se trata con antibióticos jeannine el [place], [place] bebé MEKA NUNCA se infecta con la bacteria GBS

## 2020-08-20 NOTE — PROGRESS NOTES
Eloy Romberg presents today for routine OB visit at 37w6d  Blood Pressure: 117/74  Wt=81 7 kg (180 lb 3 2 oz); Body mass index is 29 99 kg/m² ; TWG=14 6 kg (32 lb 3 2 oz)  Fetal Heart Rate: 155; Fundal Height (cm): 36 cm  Abdomen: gravid, soft, non-tender  She reports incident of falling yesterday approximately 1200  Was walking at home and slipped on a wet patch of floor  Fell onto R buttock  Denies any direct abdominal trauma  Denies uterine contractions  Denies vaginal bleeding or leaking of fluid  Reports adequate fetal movement of at least 10 movements in 2 hours once daily  Vaginal cultures: GBS negative  Reviewed labor precautions and fetal kick counts as well as pre-eclampsia warning signs  Reviewed perineal massage for decreasing risk of perineal lacerations during delivery  Advised to continue medications and return in 1 week  Current Outpatient Medications:     prenatal vitamin (CLASSIC FORMULA) 28-0 8 mg, Take 1 tablet by mouth daily with breakfast, Disp: 90 tablet, Rfl: 3      G2 Problems (from 02/05/20 to present)     Problem Noted Resolved    Contraception management 5/6/2020 by CESAR Gan No    Overview Addendum 8/6/2020 10:04 AM by CESAR Gan     Reviewed with patient surgical sterilization is PERMANENT STERILIZATION and NOT REVERSIBLE  Discussed other temporary contraceptive options (including LARC's)  Reviewed risks & benefits of surgical sterilization  Patient verbally rejects other options and desires surgical sterilization  Sterilization consent (MA 31 form) signed 6/25/2020           Fetal ultrasound 2/12/2020 by CESAR Gan No    Overview Addendum 5/6/2020 11:37 AM by CESAR Gan     1/29/20 (8w5d) EDC confirmed  2/26/20 (12w5d) NT WNL  4/30/20 (21w6d) no previa, antonio WNL & complete         Genetic screening 2/12/2020 by CESAR Gan No    Overview Addendum 5/6/2020 11:37 AM by CESAR Gan     Sequential Screen negative

## 2020-08-26 ENCOUNTER — TELEPHONE (OUTPATIENT)
Dept: OBGYN CLINIC | Facility: CLINIC | Age: 30
End: 2020-08-26

## 2020-08-27 ENCOUNTER — ROUTINE PRENATAL (OUTPATIENT)
Dept: OBGYN CLINIC | Facility: CLINIC | Age: 30
End: 2020-08-27

## 2020-08-27 VITALS
SYSTOLIC BLOOD PRESSURE: 113 MMHG | DIASTOLIC BLOOD PRESSURE: 76 MMHG | BODY MASS INDEX: 29.62 KG/M2 | WEIGHT: 178 LBS | HEART RATE: 100 BPM | TEMPERATURE: 98.2 F

## 2020-08-27 DIAGNOSIS — Z30.09 ENCOUNTER FOR OTHER GENERAL COUNSELING OR ADVICE ON CONTRACEPTION: Primary | ICD-10-CM

## 2020-08-27 PROBLEM — Z3A.38 38 WEEKS GESTATION OF PREGNANCY: Status: ACTIVE | Noted: 2020-06-04

## 2020-08-27 PROCEDURE — PNV: Performed by: OBSTETRICS & GYNECOLOGY

## 2020-08-27 NOTE — PROGRESS NOTES
[address]   [id number]  1990        A/P:  Pregnancy at 38w6d  - Patient is doing well, one prior term vaginal delivery with uncomplicated pregnancy course   - Reviewed labor precautions/ kick counts  - Feeling occasional movement/pulling on lower left side  - Desires epidural  - GBS negative  - Will return on 9/3/20 for next prenatal visit or call if she goes into labor/SROMs    Desires sterilization  - If she has a CS, she desires a tubal ligation  Risks/benefits of permanent sterilization reviewed  - Jessie Reed form signed 6/25/20       S: 27 y o  Jefry Oakley 38w6d here for PN visit  She denies contractions  She denies leakage of fluid and vaginal bleeding  She endorses good fetal movement  Her pregnancy is uncomplicated  No exposure to COVID, denies headache, shortness of breath, heart palpitations, abdominal pain      O:  Vitals:    08/27/20 0915   BP: 113/76   Pulse: 100   Temp: 98 2 °F (36 8 °C)     Physical Exam    Fundal height: 36 cm  FHT: 145-155     Future Appointments   Date Time Provider Renan Gutierrez   9/3/2020 11:15 AM Zarina Del Toro, [address] [facility] ELVA Barry 1, DO  OB/GYN PGY-1  8/27/2020  9:32 AM

## 2020-09-02 ENCOUNTER — TELEPHONE (OUTPATIENT)
Dept: OBGYN CLINIC | Facility: CLINIC | Age: 30
End: 2020-09-02

## 2020-09-03 ENCOUNTER — ROUTINE PRENATAL (OUTPATIENT)
Dept: OBGYN CLINIC | Facility: CLINIC | Age: 30
End: 2020-09-03

## 2020-09-03 VITALS
HEART RATE: 85 BPM | WEIGHT: 182 LBS | BODY MASS INDEX: 30.29 KG/M2 | SYSTOLIC BLOOD PRESSURE: 101 MMHG | TEMPERATURE: 98.2 F | DIASTOLIC BLOOD PRESSURE: 66 MMHG

## 2020-09-03 DIAGNOSIS — Z3A.39 39 WEEKS GESTATION OF PREGNANCY: Primary | ICD-10-CM

## 2020-09-03 PROCEDURE — PNV: Performed by: NURSE PRACTITIONER

## 2020-09-03 NOTE — PROGRESS NOTES
Mansoor Sweet presents today for routine OB visit at 39w6d  Blood Pressure: 101/66  Wt=82 6 kg (182 lb); Body mass index is 30 29 kg/m² ; TWG=15 4 kg (34 lb)  Fetal Heart Rate: 133; Fundal Height (cm): 37 cm  Abdomen: gravid, soft, non-tender  She reports pelvic pressure  Denies uterine contractions  Denies vaginal bleeding or leaking of fluid  Reports adequate fetal movement of at least 10 movements in 2 hours once daily  Vaginal cultures: GBS negative  Reviewed labor precautions and fetal kick counts as well as pre-eclampsia warning signs  Reviewed perineal massage for decreasing risk of perineal lacerations during delivery  Advised to continue medications and return in 1 week  Current Outpatient Medications   Medication Instructions    prenatal vitamin (CLASSIC FORMULA) 28-0 8 mg 1 tablet, Oral, Daily with breakfast         G2 Problems (from 02/05/20 to present)     Problem Noted Resolved    Contraception management 5/6/2020 by CESAR Hudson No    Overview Addendum 8/6/2020 10:04 AM by CESAR Hudson     Reviewed with patient surgical sterilization is PERMANENT STERILIZATION and NOT REVERSIBLE  Discussed other temporary contraceptive options (including LARC's)  Reviewed risks & benefits of surgical sterilization  Patient verbally rejects other options and desires surgical sterilization  Sterilization consent (MA 31 form) signed 6/25/2020           Fetal ultrasound 2/12/2020 by CESAR Hudson No    Overview Addendum 5/6/2020 11:37 AM by CESAR Hudson     1/29/20 (8w5d) EDC confirmed  2/26/20 (12w5d) NT WNL  4/30/20 (21w6d) no previa, antonio WNL & complete         Genetic screening 2/12/2020 by CESAR Hudson No    Overview Addendum 5/6/2020 11:37 AM by CESAR Hudson     Sequential Screen negative

## 2020-09-03 NOTE — PATIENT INSTRUCTIONS
LABOR PRECAUTIONS  Call our office at [phone] for any of the following:    * I need to call immediately I if I have even a small amount of LIQUID  leaking from my vagina, with or without contractions  * I need to call if I am BLEEDING an amount equal to or more than a period  A small amount of bloody vaginal discharge is normal at the end of the pregnancy  * I need to call if I am having CONTRACTIONS  every five minutes for at least an hour  I will need a watch in order to time them  I should time them from the beginning of one contraction until the beginning of the next one  * I need to call BEFORE  I go to the hospital    * I need to have a plan for TRANSPORTATION  to get to the hospital when I am in labor  Labor is generally not an emergency which requires an ambulance  FETAL KICK COUNTS    In the third trimester (after 28 weeks gestation) you should be performing fetal kick counts every day  Your baby should move at least 10 times in 2 hours during an active time, once a day  Choose atime of day when your baby is most active  Try to do this around the same time each day  Get into a comfortable position and then write down the time your baby first moves  Count each movement until the baby moves 10 times  These movements include kicks, punches, nudges, flutters, or rolls  This can take anywhere from 5 minutes to 2 hours  Write down the time you feel the baby's 10th movement  If 2 hours has passed and your baby has not moved at least 10 times, you should CALL THE OFFICE RIGHT AWAY  [phone]        PERINEAL / VAGINAL MASSAGE    What can I do now to decrease my chances of tearing during delivery? Massaging around the vaginal opening by you (or your partner), either antepartum (before birth) or during the second stage of labor, can reduce the likelihood of perineal tearing during childbirth    Likewise, the use of warm packs held on the perineum during the pushing stage of labor can reduce the severity of your tear  This will happen during the pushing stage of labor  At home, you can also help reduce the chances of injury that may occur during the birth of your child through perineal massage  When should I do this? Starting around or shortly after 34 weeks of pregnancy, you or your partner should provide 5-10 minutes of vaginal massage 1-4 times per week  How? Use either almond, coconut, or olive oil and water mixture on 1 or 2 fingers (depending on comfort)  Insert finger(s) 3-5cm into the vagina  Apply sweeping downward/sideward pressure from 3 to 9 o'clock for 5-10 minutes, 1-4 times per week

## 2020-09-09 ENCOUNTER — TELEPHONE (OUTPATIENT)
Dept: OBGYN CLINIC | Facility: CLINIC | Age: 30
End: 2020-09-09

## 2020-09-10 ENCOUNTER — ROUTINE PRENATAL (OUTPATIENT)
Dept: OBGYN CLINIC | Facility: CLINIC | Age: 30
End: 2020-09-10

## 2020-09-10 VITALS
SYSTOLIC BLOOD PRESSURE: 110 MMHG | HEART RATE: 92 BPM | DIASTOLIC BLOOD PRESSURE: 74 MMHG | BODY MASS INDEX: 30.09 KG/M2 | WEIGHT: 180.8 LBS | TEMPERATURE: 98 F

## 2020-09-10 DIAGNOSIS — Z3A.39 39 WEEKS GESTATION OF PREGNANCY: Primary | ICD-10-CM

## 2020-09-10 DIAGNOSIS — Z30.09 ENCOUNTER FOR OTHER GENERAL COUNSELING OR ADVICE ON CONTRACEPTION: ICD-10-CM

## 2020-09-10 PROCEDURE — PNV: Performed by: OBSTETRICS & GYNECOLOGY

## 2020-09-10 NOTE — ASSESSMENT & PLAN NOTE
- Delivery:  with epidural  - IOL scheduled for tomorrow morning (9/10) at 0800;  Consent under media  - Labor precautions reviewed  - Fetal kick counts reviewed   - Contraception: Tubal sterilization

## 2020-09-10 NOTE — PATIENT INSTRUCTIONS
Induction of Labor   WHAT YOU NEED TO KNOW:   What is induction of labor? Induction of labor is a procedure to induce (start) your labor before it begins on its own  Medicines and other methods are used to start contractions and help your cervix soften, thin (efface), and dilate (open)  Why might I need induction of labor? · Health problems you have, such as high blood pressure or diabetes    · Health problems your baby has, such as a slow heartbeat or poor growth inside the womb     · Problems related to your pregnancy such as infection of the amniotic fluid, your water breaks before labor begins, or you have too little amniotic fluid    · You are past your due date  What happens during induction of labor? Your healthcare provider may use one or more of the following to induce labor:  · Cervical ripening  is a process that helps to soften and thin out your cervix  Medicines called prostaglandins may be used to ripen your cervix  These medicines can be inserted into your vagina or taken as a pill  Other methods can also be used to dilate the cervix  This includes a catheter with an inflatable balloon on the end that is inserted into your cervix  Saline injected through the catheter helps the balloon to expand  A substance that absorbs water may also be inserted into your cervix to help dilate it  · Stripping the membranes  is a procedure that causes your body to release prostaglandins naturally  Prostaglandins soften the cervix and may help to cause contractions  Your healthcare provider will sweep a gloved finger over the membranes that connect the amniotic sac to the uterus wall  · Rupturing the amniotic sac  is a procedure that is used to cause your water to break  Your healthcare provider will use a small tool to make a hole in your amniotic sac  This may help contractions to start  · Oxytocin  may be given through an IV to cause contractions to start and stay strong and regular    What are the risks of induction of labor? Medicines used to induce labor may cause too many contractions  This can lower your baby's heartbeat and decrease his or her oxygen supply  Induction of labor also increases the risk of umbilical cord prolapse  This condition causes the umbilical cord to slip back into the vagina before delivery  It can compress the cord and decrease your baby's oxygen supply  Medical induction may cause an infection in you or your baby  Medical induction may also increase your risk for a , especially if it is the first time you give birth  Your uterus may rupture if you have had a  section () before  CARE AGREEMENT:   You have the right to help plan your care  Learn about your health condition and how it may be treated  Discuss treatment options with your caregivers to decide what care you want to receive  You always have the right to refuse treatment  The above information is an  only  It is not intended as medical advice for individual conditions or treatments  Talk to your doctor, nurse or pharmacist before following any medical regimen to see if it is safe and effective for you  ©  2600 [facility] Information is for End User's use only and may not be sold, redistributed or otherwise used for commercial purposes  All illustrations and images included in CareNotes® are the copyrighted property of [employer]  or Juan Antonio Antonio  Early Labor Signs   WHAT YOU SHOULD KNOW:   Early labor signs are changes in your body that allow your baby to pass through your birth canal   AFTER YOU LEAVE:   Signs and symptoms of early labor:   · Lightening  occurs when your baby drops inside your pelvis  You may feel increased pressure in your pelvis  This may happen a few weeks to a few hours before your labor begins       · Contractions  are cramps and tightening that occur in your uterus to help move the baby through your birth canal  Contractions occur regularly and more often each time  Each one lasts about 30 to 70 seconds, and gets stronger and more painful until you deliver your baby  Contractions do not go away with movement  They start in your lower back and move to the front in your abdomen  · Effacement  occurs when your cervix softens and thins, so it can easily open for the baby  Your primary healthcare provider [facility] or obstetrician will examine your cervix for effacement  · Dilation  is widening of your cervix, also for the baby's passage  Your PHP or obstetrician will examine your cervix for dilation  Your cervix will be fully opened and ready for delivery when it is dilated to 10 centimeters  · Increased discharge  from your vagina may occur  It may be pink, clear, or slightly bloody  This discharge may also be called bloody show  Bloody show is a mucus plug that forms and blocks your cervix during pregnancy  · Rupture of membranes  is a sudden release of clear fluid from your vagina  It is also known as when your water breaks  Your PHP or obstetrician may need to break your water if it does not break on its own  False labor: You may have false labor signs, which are also called [place] contractions  False labor is common and may happen several weeks or days before your actual labor  The contractions are not regular, and do not get closer together  The pain is usually mild, does not worsen, and is felt only in front  [place] contractions may happen later in the day, and stop after you change position, walk, or rest   Contact your PHP or obstetrician if:   · You have pain in your lower back or abdomen  · You have bloody mucus or show  · You have questions or concerns about your condition or care  Seek care immediately or call 911 if:   · You have regular, painful contractions that are less than 5 minutes apart, and last 30 to 70 seconds each  · You have heavy vaginal bleeding      · You have a constant trickle or sudden gush of clear fluid from your vagina  · You notice a sudden decrease in your baby's movement  © 2014 [address] [employer] is for End User's use only and may not be sold, redistributed or otherwise used for commercial purposes  All illustrations and images included in CareNotes® are the copyrighted property of [employer]  or Juan Antonio Antonio  The above information is an  only  It is not intended as medical advice for individual conditions or treatments  Talk to your doctor, nurse or pharmacist before following any medical regimen to see if it is safe and effective for you  Early Labor Signs   WHAT YOU SHOULD KNOW:   Early labor signs are changes in your body that allow your baby to pass through your birth canal   AFTER YOU LEAVE:   Signs and symptoms of early labor:   · Lightening  occurs when your baby drops inside your pelvis  You may feel increased pressure in your pelvis  This may happen a few weeks to a few hours before your labor begins  · Contractions  are cramps and tightening that occur in your uterus to help move the baby through your birth canal  Contractions occur regularly and more often each time  Each one lasts about 30 to 70 seconds, and gets stronger and more painful until you deliver your baby  Contractions do not go away with movement  They start in your lower back and move to the front in your abdomen  · Effacement  occurs when your cervix softens and thins, so it can easily open for the baby  Your primary healthcare provider [facility] or obstetrician will examine your cervix for effacement  · Dilation  is widening of your cervix, also for the baby's passage  Your PHP or obstetrician will examine your cervix for dilation  Your cervix will be fully opened and ready for delivery when it is dilated to 10 centimeters  · Increased discharge  from your vagina may occur  It may be pink, clear, or slightly bloody  This discharge may also be called bloody show  Bloody show is a mucus plug that forms and blocks your cervix during pregnancy  · Rupture of membranes  is a sudden release of clear fluid from your vagina  It is also known as when your water breaks  Your PHP or obstetrician may need to break your water if it does not break on its own  False labor: You may have false labor signs, which are also called [place] contractions  False labor is common and may happen several weeks or days before your actual labor  The contractions are not regular, and do not get closer together  The pain is usually mild, does not worsen, and is felt only in front  [place] contractions may happen later in the day, and stop after you change position, walk, or rest   Contact your PHP or obstetrician if:   · You have pain in your lower back or abdomen  · You have bloody mucus or show  · You have questions or concerns about your condition or care  Seek care immediately or call 911 if:   · You have regular, painful contractions that are less than 5 minutes apart, and last 30 to 70 seconds each  · You have heavy vaginal bleeding  · You have a constant trickle or sudden gush of clear fluid from your vagina  · You notice a sudden decrease in your baby's movement  © 2014 [address] [employer] is for End User's use only and may not be sold, redistributed or otherwise used for commercial purposes  All illustrations and images included in CareNotes® are the copyrighted property of A [employer] M [employer]  or Juan Antonio Antonio  The above information is an  only  It is not intended as medical advice for individual conditions or treatments  Talk to your doctor, nurse or pharmacist before following any medical regimen to see if it is safe and effective for you

## 2020-09-10 NOTE — PROGRESS NOTES
OB/GYN  PN Visit  Helen Mueller  [id number]  9/10/2020  3:40 PM  Dr Dayanara Holliday MD    S: 27 y o  Cyndy Garcia here for PN visit  She has occasional contractions  She denies leakage of fluid and vaginal bleeding  She reports good fetal movement  Her pregnancy is uncomplicated  O:  Vitals:    09/10/20 1139   BP: 110/74   Pulse: 92   Temp: 98 °F (36 7 °C)     Physical Exam  Vitals signs reviewed  Constitutional:       General: She is not in acute distress  Appearance: She is well-developed  She is not diaphoretic  Cardiovascular:      Rate and Rhythm: Normal rate  Pulmonary:      Effort: Pulmonary effort is normal    Abdominal:      Palpations: Abdomen is soft  Genitourinary:     Comments: Gravid, nontender  FHT: 147bpm  FH: 40 5cm  SVE: Vertex, 3/50/-3, Membranes stripped  Skin:     General: Skin is warm and dry  Neurological:      Mental Status: She is alert and oriented to person, place, and time  Psychiatric:         Behavior: Behavior normal        A/P:  Problem List Items Addressed This Visit        Unprioritized    Contraception management     - Tubal sterilization desired  - MA 31 signed 2020         39 weeks gestation of pregnancy - Primary     - Delivery:  with epidural  - IOL scheduled for tomorrow morning (9/10) at 0800; Consent under media  - Labor precautions reviewed  - Fetal kick counts reviewed   - Contraception: Tubal sterilization               No future appointments      Dayanara Holliday MD  9/10/2020  3:40 PM

## 2020-09-11 ENCOUNTER — ANESTHESIA EVENT (INPATIENT)
Dept: ANESTHESIOLOGY | Facility: HOSPITAL | Age: 30
End: 2020-09-11
Payer: COMMERCIAL

## 2020-09-11 ENCOUNTER — HOSPITAL ENCOUNTER (INPATIENT)
Facility: HOSPITAL | Age: 30
LOS: 2 days | Discharge: HOME | End: 2020-09-13
Attending: OBSTETRICS & GYNECOLOGY | Admitting: OBSTETRICS & GYNECOLOGY
Payer: COMMERCIAL

## 2020-09-11 ENCOUNTER — ANESTHESIA (INPATIENT)
Dept: ANESTHESIOLOGY | Facility: HOSPITAL | Age: 30
End: 2020-09-11
Payer: COMMERCIAL

## 2020-09-11 DIAGNOSIS — Z3A.41 41 WEEKS GESTATION OF PREGNANCY: ICD-10-CM

## 2020-09-11 DIAGNOSIS — Z30.09 ENCOUNTER FOR OTHER GENERAL COUNSELING OR ADVICE ON CONTRACEPTION: ICD-10-CM

## 2020-09-11 PROBLEM — Z34.90 CURRENTLY PREGNANT: Status: ACTIVE | Noted: 2020-09-11

## 2020-09-11 PROBLEM — O48.0 41 WEEKS GESTATION OF PREGNANCY: Status: ACTIVE | Noted: 2020-09-11

## 2020-09-11 LAB
ABO GROUP BLD: NORMAL
BASE EXCESS BLDCOV CALC-SCNC: -4.3 MMOL/L (ref 1–9)
BLD GP AB SCN SERPL QL: NEGATIVE
ERYTHROCYTE [DISTWIDTH] IN BLOOD BY AUTOMATED COUNT: 13.8 % (ref 11.6–15.1)
HCO3 BLDCOV-SCNC: 19.8 MMOL/L (ref 12.2–28.6)
HCT VFR BLD AUTO: 39.3 % (ref 34.8–46.1)
HGB BLD-MCNC: 13 G/DL (ref 11.5–15.4)
MCH RBC QN AUTO: 30.2 PG (ref 26.8–34.3)
MCHC RBC AUTO-ENTMCNC: 33.1 G/DL (ref 31.4–37.4)
MCV RBC AUTO: 91 FL (ref 82–98)
OXYHGB MFR BLDCOV: 81.6 %
PCO2 BLDCOV: 34.3 MM HG (ref 27–43)
PH BLDCOV: 7.38 [PH] (ref 7.19–7.49)
PLATELET # BLD AUTO: 215 THOUSANDS/UL (ref 149–390)
PMV BLD AUTO: 10.5 FL (ref 8.9–12.7)
PO2 BLDCOV: 36.7 MM HG (ref 15–45)
RBC # BLD AUTO: 4.3 MILLION/UL (ref 3.81–5.12)
RH BLD: POSITIVE
SAO2 % BLDCOV: 18.5 ML/DL
SPECIMEN EXPIRATION DATE: NORMAL
WBC # BLD AUTO: 15.92 THOUSAND/UL (ref 4.31–10.16)

## 2020-09-11 PROCEDURE — 86592 SYPHILIS TEST NON-TREP QUAL: CPT | Performed by: OBSTETRICS & GYNECOLOGY

## 2020-09-11 PROCEDURE — G0463 HOSPITAL OUTPT CLINIC VISIT: HCPCS

## 2020-09-11 PROCEDURE — 59400 OBSTETRICAL CARE: CPT | Performed by: OBSTETRICS & GYNECOLOGY

## 2020-09-11 PROCEDURE — 99024 POSTOP FOLLOW-UP VISIT: CPT | Performed by: OBSTETRICS & GYNECOLOGY

## 2020-09-11 PROCEDURE — 86901 BLOOD TYPING SEROLOGIC RH(D): CPT | Performed by: OBSTETRICS & GYNECOLOGY

## 2020-09-11 PROCEDURE — NC001 PR NO CHARGE: Performed by: OBSTETRICS & GYNECOLOGY

## 2020-09-11 PROCEDURE — 86850 RBC ANTIBODY SCREEN: CPT | Performed by: OBSTETRICS & GYNECOLOGY

## 2020-09-11 PROCEDURE — 86900 BLOOD TYPING SEROLOGIC ABO: CPT | Performed by: OBSTETRICS & GYNECOLOGY

## 2020-09-11 PROCEDURE — 10907ZC DRAINAGE OF AMNIOTIC FLUID, THERAPEUTIC FROM PRODUCTS OF CONCEPTION, VIA NATURAL OR ARTIFICIAL OPENING: ICD-10-PCS | Performed by: OBSTETRICS & GYNECOLOGY

## 2020-09-11 PROCEDURE — 0KQM0ZZ REPAIR PERINEUM MUSCLE, OPEN APPROACH: ICD-10-PCS | Performed by: OBSTETRICS & GYNECOLOGY

## 2020-09-11 PROCEDURE — 85027 COMPLETE CBC AUTOMATED: CPT | Performed by: OBSTETRICS & GYNECOLOGY

## 2020-09-11 PROCEDURE — 82805 BLOOD GASES W/O2 SATURATION: CPT | Performed by: OBSTETRICS & GYNECOLOGY

## 2020-09-11 PROCEDURE — 99202 OFFICE O/P NEW SF 15 MIN: CPT

## 2020-09-11 RX ORDER — CALCIUM CARBONATE 200(500)MG
1000 TABLET,CHEWABLE ORAL DAILY PRN
Status: DISCONTINUED | OUTPATIENT
Start: 2020-09-11 | End: 2020-09-13 | Stop reason: HOSPADM

## 2020-09-11 RX ORDER — DIAPER,BRIEF,INFANT-TODD,DISP
1 EACH MISCELLANEOUS 4 TIMES DAILY PRN
Status: DISCONTINUED | OUTPATIENT
Start: 2020-09-11 | End: 2020-09-13 | Stop reason: HOSPADM

## 2020-09-11 RX ORDER — ONDANSETRON 2 MG/ML
4 INJECTION INTRAMUSCULAR; INTRAVENOUS EVERY 8 HOURS PRN
Status: DISCONTINUED | OUTPATIENT
Start: 2020-09-11 | End: 2020-09-13 | Stop reason: HOSPADM

## 2020-09-11 RX ORDER — ROPIVACAINE HYDROCHLORIDE 2 MG/ML
INJECTION, SOLUTION EPIDURAL; INFILTRATION; PERINEURAL
Status: COMPLETED
Start: 2020-09-11 | End: 2020-09-11

## 2020-09-11 RX ORDER — DIPHENHYDRAMINE HCL 25 MG
25 TABLET ORAL EVERY 6 HOURS PRN
Status: DISCONTINUED | OUTPATIENT
Start: 2020-09-11 | End: 2020-09-13 | Stop reason: HOSPADM

## 2020-09-11 RX ORDER — ACETAMINOPHEN 325 MG/1
650 TABLET ORAL EVERY 4 HOURS PRN
Status: DISCONTINUED | OUTPATIENT
Start: 2020-09-11 | End: 2020-09-13 | Stop reason: HOSPADM

## 2020-09-11 RX ORDER — SIMETHICONE 80 MG
80 TABLET,CHEWABLE ORAL 4 TIMES DAILY PRN
Status: DISCONTINUED | OUTPATIENT
Start: 2020-09-11 | End: 2020-09-13 | Stop reason: HOSPADM

## 2020-09-11 RX ORDER — OXYTOCIN/RINGER'S LACTATE 30/500 ML
PLASTIC BAG, INJECTION (ML) INTRAVENOUS
Status: DISPENSED
Start: 2020-09-11 | End: 2020-09-11

## 2020-09-11 RX ORDER — ONDANSETRON 2 MG/ML
4 INJECTION INTRAMUSCULAR; INTRAVENOUS EVERY 6 HOURS PRN
Status: DISCONTINUED | OUTPATIENT
Start: 2020-09-11 | End: 2020-09-11

## 2020-09-11 RX ORDER — SODIUM CHLORIDE, SODIUM LACTATE, POTASSIUM CHLORIDE, CALCIUM CHLORIDE 600; 310; 30; 20 MG/100ML; MG/100ML; MG/100ML; MG/100ML
125 INJECTION, SOLUTION INTRAVENOUS CONTINUOUS
Status: DISCONTINUED | OUTPATIENT
Start: 2020-09-11 | End: 2020-09-11

## 2020-09-11 RX ORDER — ROPIVACAINE HYDROCHLORIDE 2 MG/ML
INJECTION, SOLUTION EPIDURAL; INFILTRATION; PERINEURAL
Status: COMPLETED | OUTPATIENT
Start: 2020-09-11 | End: 2020-09-11

## 2020-09-11 RX ORDER — FENTANYL CITRATE 50 UG/ML
INJECTION, SOLUTION INTRAMUSCULAR; INTRAVENOUS
Status: DISPENSED
Start: 2020-09-11 | End: 2020-09-12

## 2020-09-11 RX ORDER — IBUPROFEN 600 MG/1
600 TABLET ORAL EVERY 4 HOURS PRN
Status: DISCONTINUED | OUTPATIENT
Start: 2020-09-11 | End: 2020-09-13 | Stop reason: HOSPADM

## 2020-09-11 RX ADMIN — SODIUM CHLORIDE, SODIUM LACTATE, POTASSIUM CHLORIDE, AND CALCIUM CHLORIDE 999 ML/HR: .6; .31; .03; .02 INJECTION, SOLUTION INTRAVENOUS at 06:50

## 2020-09-11 RX ADMIN — IBUPROFEN 600 MG: 600 TABLET ORAL at 18:38

## 2020-09-11 RX ADMIN — SODIUM CHLORIDE, SODIUM LACTATE, POTASSIUM CHLORIDE, AND CALCIUM CHLORIDE 125 ML/HR: .6; .31; .03; .02 INJECTION, SOLUTION INTRAVENOUS at 07:34

## 2020-09-11 RX ADMIN — ROPIVACAINE HYDROCHLORIDE 10 ML: 2 INJECTION, SOLUTION EPIDURAL; INFILTRATION at 07:30

## 2020-09-11 RX ADMIN — ROPIVACAINE HYDROCHLORIDE: 2 INJECTION, SOLUTION EPIDURAL; INFILTRATION at 07:34

## 2020-09-11 NOTE — PLAN OF CARE
Problem: Knowledge Deficit  Goal: Verbalizes understanding of labor plan  Description: Assess patient/family/caregiver's baseline knowledge level and ability to understand information  Provide education via patient/family/caregiver's preferred learning method at appropriate level of understanding  1  Provide teaching at level of understanding  2  Provide teaching via preferred learning method(s)  Outcome: Completed     Problem: Labor & Delivery  Goal: Manages discomfort  Description: Assess and monitor for signs and symptoms of discomfort  Assess patient's pain level regularly and per hospital policy  Administer medications as ordered  Support use of nonpharmacological methods to help control pain such as distraction, imagery, relaxation, and application of heat and cold  Collaborate with interdisciplinary team and patient to determine appropriate pain management plan  1  Include patient in decisions related to comfort  2  Offer non-pharmacological pain management interventions  3  Report ineffective pain management to physician  Outcome: Completed  Goal: Patient vital signs are stable  Description: 1  Assess vital signs - vaginal delivery    Outcome: Completed

## 2020-09-11 NOTE — ANESTHESIA PREPROCEDURE EVALUATION
Procedure:  LABOR ANALGESIA    Relevant Problems   ANESTHESIA (within normal limits)      CARDIO (within normal limits)      ENDO (within normal limits)      GI/HEPATIC (within normal limits)      GYN   (+) Currently pregnant      HEMATOLOGY (within normal limits)      MUSCULOSKELETAL (within normal limits)      NEURO/PSYCH (within normal limits)      PULMONARY (within normal limits)        Physical Exam    Airway    Mallampati score: I  TM Distance: >3 FB  Neck ROM: full     Dental   No notable dental hx     Cardiovascular  Cardiovascular exam normal    Pulmonary  Pulmonary exam normal     Other Findings        Anesthesia Plan  ASA Score- 2     Anesthesia Type- epidural with ASA Monitors  Additional Monitors:   Airway Plan:           Plan Factors-    Induction-     Postoperative Plan-     Informed Consent- Anesthetic plan and risks discussed with patient

## 2020-09-11 NOTE — OB LABOR/OXYTOCIN SAFETY PROGRESS
Labor Progress Note - Marko Rodney 27 y o  female MRN: [id number]    Unit/Bed#: L&D 323-01 Encounter: [id number]                 Cervical Dilation: 7-8        Cervical Effacement: 80  Fetal Station: -1                        Vital Signs:   Vitals:    09/11/20 0952   BP: 112/64   Pulse: 103   Resp:    Temp:    SpO2:            Notes/comments:   -  Patient resting comfortably w/ epidural  - SVE as noted above  - Will AROM once labor floor is safe to do so  -  Patient states that she did not push for a long time with her last baby  Carlos Marcos DO 9/11/2020 10:03 AM

## 2020-09-11 NOTE — L&D DELIVERY NOTE
Vaginal Delivery Summary - OB/GYN   Gianna Malagon 27 y o  female MRN: [id number]  Unit/Bed#: L&D 323-01 Encounter: [id number]          Pre-delivery Diagnosis:   1  Pregnancy at 39 [id number]   2  Active MA31 signed 20    Post-delivery Diagnosis: same, delivered    Procedure: Spontaneous Vaginal Delivery     Attending: Dr Ermelinda Lantigua    Assistant(s): Dr Katie Hathaway, Dr Maryellen Evans    Anesthesia: Epidural    QBL: 732NM    Complications: none apparent    Specimens:   1  Arterial and venous cord gases  2  Cord blood  3  Segment of umbilical cord  4  Placenta to storage     Findings:  1  Viable male on 2020 at 1506, with APGARS of 9 and 9 at 1 and 5 minutes respectively,  2  Spontaneous delivery of intact placenta at 1512  3  2 degree laceration repaired with 2-0 vicryl rapide, 3-0 vicryl rapide  4  Blood gases:   Arterial pH: not enough collected   Arterial base excess: not enough collected   Venous pH: 7 3   Venous base excess: -4 3    Disposition:  The patient and the  both tolerated the procedure well and are recovering in labor and delivery room       Brief history and labor course:  Gianna Malagon is a 27 y o  [id number]   She presented to labor and delivery for labor  Her pregnancy was uncomplicated  On exam in triage she was noted to be 5 5/90/-2  She was admitted for labor  Amniotomy was performed at 1115 for  clear fluid  She continued to make change and progressed to be complete at 2:58 p m  And started pushing at 3:01 p m  Description of procedure:    After pushing for 11 minutes, at 1506 patient delivered a viable maleneonate, wt pending, apgars of 9 (1 min) and 9 (5 min)  The fetal vertex delivered DOA position spontaneously  There was no nuchal cord  The right anterior shoulder delivered atraumatically with maternal expulsive forces and the assistance of gentle downward traction   The left posterior shoulder delivered with maternal expulsive forces and the assistance of gentle upward traction  The remainder of the fetus delivered spontaneously  Upon delivery, the infant was placed on the mothers abdomen and the cord was clamped and cut  The infant was noted to cry spontaneously and was moving all extremities appropriately  There was no evidence for injury  Awaiting nurse resuscitators evaluated the   Arterial and venous cord blood gases and cord blood was collected for analysis  These were promptly sent to the lab  In the immediate post-partum, active management of the 3rd stage of labor was performed with massage, the administration of 30 units of IV pitocin, and gentle traction on the umbilical cord  The placenta delivered spontaneously at 1512 and was noted to have a centrally inserted 3 vessel cord  The placenta was sent to storage       The vagina, cervix, perineum, and rectum were inspected and there was noted to be a second degree perineal repair in the midline  Laceration Repair  Patient was comfortable with epidural at that time  A second degree perineal repair in the midline was identified and required repair  Laceration was repaired with 2-0 and 3-0 Vicryl rapide in  The vaginal mucosa and submucosa were re approximated using 3-0 vicryl rapide to the point of the hymenal ring  Interrupted 3-0 vicryl was used to re approximate the muscle and fascia  The superficial perineal fascia was then re approximated using 3-0 vicryl in a running non locked stitch downward followed by a running subcuticular stitch upward to the level of the introitus  Good hemostasis was confirmed at the conclusion of this procedure  At the conclusion of the procedure, all needle, sponge, and instrument counts were noted to be correct  Patient tolerated the procedure well and was allowed to recover in labor and delivery room with family and  before being transferred to the post-partum floor  Dr Kenyatta Harvey was present and participated in all key portions of the case        [facility] Karey Luevano 89, DO  OB/GYN PGY-1  9/11/2020  3:48 PM

## 2020-09-11 NOTE — OB LABOR/OXYTOCIN SAFETY PROGRESS
Labor Progress Note - Marko Rodney 27 y o  female MRN: [id number]    Unit/Bed#: L&D 323-01 Encounter: [id number]       Contraction Frequency (minutes): 2-4  Contraction Quality: Moderate  Tachysystole: No   Cervical Dilation: Lip/rim (Comment)        Cervical Effacement: 100  Fetal Station: 0  Baseline Rate: 145 bpm                     Vital Signs:  Vitals:    09/11/20 1336   BP: 90/55   Pulse: 90   Resp:    Temp: 99 °F (37 2 °C)   SpO2:            Notes/comments:     Cervical change appreciated  Anterior lip on right side  Will place patient on right side  Continue on peanut ball        Maryellen Roberts DO 9/11/2020 2:05 PM

## 2020-09-11 NOTE — ANESTHESIA PROCEDURE NOTES
Epidural Block    Patient location during procedure: OB  Start time: 9/11/2020 7:23 AM  Reason for block: at surgeon's request and primary anesthetic  Staffing  Anesthesiologist: Mary Huston MD  Performed: anesthesiologist   Preanesthetic Checklist  Completed: patient identified, site marked, surgical consent, pre-op evaluation, timeout performed, IV checked, risks and benefits discussed and monitors and equipment checked  Epidural  Patient position: sitting  Prep: Betadine  Patient monitoring: frequent blood pressure checks  Approach: midline  Location: lumbar (1-5)  Injection technique: ANGY saline  Needle  Needle type: Tuohy   Needle gauge: 18 G  Catheter type: side hole  Catheter size: 20 G  Test dose: negativeropivacaine (NAROPIN) 0 2% epidural injection, 10 mL  Assessment  Sensory level: [id number] aspiration for CSF, negative aspiration for heme and no paresthesia on injection  patient tolerated the procedure well with no immediate complications

## 2020-09-11 NOTE — ANESTHESIA POSTPROCEDURE EVALUATION
Post-Op Assessment Note    CV Status:  Stable    Pain management: adequate     Mental Status:  Alert and awake   Hydration Status:  Euvolemic   PONV Controlled:  Controlled   Airway Patency:  Patent      Post Op Vitals Reviewed: Yes      Staff: Anesthesiologist     Post-op block assessment: no complications and catheter intact      No complications documented      /56 (09/11/20 1551)    Temp      Pulse 104 (09/11/20 1551)   Resp      SpO2

## 2020-09-11 NOTE — H&P
H&P Exam - Obstetrics   Damion Dang 27 y o  female MRN: [id number]  Unit/Bed#: L&D 323-01 Encounter: [id number]      ASSESSMENT:  30yo  at 41w0d weeks gestation who is being admitted for active labor   EFW: 7 5 lbs    PLAN:    Pregnancy at 41w0d  Admit  Follow up CBC, RPR, Blood Type  Start with expectant management  Method of contraception: We will discuss with patient  GBS negative status  Analgesia and/or epidural at patient request  Anticipate     Discussed with Dr Emmanuelle Khanna       This patient will be an INPATIENT  and I certify the anticipated length of stay is >2 Midnights  History of Present Illness     Chief Complaint: Active labor    HPI:  Damion Dang is a 27 y o   female with an FRANK of 2020, by Ultrasound at 41w0d weeks gestation who is being admitted for active labor  She states that around 1:00 a m , she began to feel more persistent and painful contractions that have been occurring every few minutes  She denies leakage of fluid, vaginal bleeding, and has good fetal movement  Her obstetric history is significant for one uncomplicated spontaneous vaginal delivery in 2018, the baby was 7 lb 7 oz  She is a [facility] patient  PREGNANCY COMPLICATIONS:   None     OB History    Para Term  AB Living   2 1 1 0 0 1   SAB TAB Ectopic Multiple Live Births   0 0 0 0 1      # Outcome Date GA Lbr Rory/2nd Weight Sex Delivery Anes PTL Lv   2 Current            1 Term 18 40w0d  3374 g (7 lb 7 oz) F Vag-Spont EPI N ANITRA      Birth Comments: FOB1       Baby complications/comments: None    Review of Systems   Constitutional: Negative for chills and fever  Eyes: Negative for visual disturbance  Respiratory: Negative for cough and shortness of breath  Cardiovascular: Negative for chest pain, palpitations and leg swelling  Gastrointestinal: Positive for abdominal pain  Negative for diarrhea, nausea and vomiting          Contractions   Genitourinary: Negative for dysuria, hematuria, pelvic pain, vaginal bleeding, vaginal discharge and vaginal pain  Neurological: Negative for dizziness, light-headedness and headaches  Historical Information   Past Medical History:   Diagnosis Date    Hyperlipemia     previously tx with Simvastatin, no meds since 2017     Past Surgical History:   Procedure Laterality Date    NO PAST SURGERIES       Social History   Social History     Substance and Sexual Activity   Alcohol Use Not Currently    Frequency: Monthly or less    Drinks per session: 1 or 2     Social History     Substance and Sexual Activity   Drug Use Never     Social History     Tobacco Use   Smoking Status Never Smoker   Smokeless Tobacco Never Used     Family History: non-contributory    Meds/Allergies      Medications Prior to Admission   Medication    prenatal vitamin (CLASSIC FORMULA) 28-0 8 mg      No Known Allergies    OBJECTIVE:    Vitals: Blood pressure 116/66, pulse (!) 106, temperature 97 8 °F (36 6 °C), temperature source Oral, resp  rate 18, height 5' 4" (1 626 m), weight 81 6 kg (180 lb), SpO2 97 %  Body mass index is 30 9 kg/m²  Physical Exam  Constitutional:       General: She is not in acute distress  Appearance: She is well-developed  HENT:      Head: Normocephalic and atraumatic  Cardiovascular:      Rate and Rhythm: Normal rate and regular rhythm  Heart sounds: Normal heart sounds  Pulmonary:      Effort: Pulmonary effort is normal  No respiratory distress  Breath sounds: Normal breath sounds  Abdominal:      Tenderness: There is no abdominal tenderness  There is no guarding  Comments: Gravid uterus   Genitourinary:     General: Normal vulva  Vagina: No vaginal discharge  Musculoskeletal:         General: No tenderness  Skin:     General: Skin is warm and dry  Neurological:      Mental Status: She is alert and oriented to person, place, and time     Psychiatric:         Behavior: Behavior normal  Thought Content:  Thought content normal          Cervix:   5-6/90/-2    Fetal heart rate:    140 BPM, difficult to get a solid FHT since patient is moving around and uncomfortable    M[place]:    Every 2-3 minutes    GBS: Negative     Prenatal Labs:   Blood Type:   Lab Results   Component Value Date/Time    ABO Grouping O 02/06/2020 01:31 PM     , D (Rh type):   Lab Results   Component Value Date/Time    Rh Factor Positive 02/06/2020 01:31 PM   HCT/HGB:   Lab Results   Component Value Date/Time    Hematocrit 33 7 (L) 06/15/2020 08:09 AM    Hemoglobin 11 6 (L) 06/15/2020 08:09 AM      , MCV:   Lab Results   Component Value Date/Time    MCV 90 06/15/2020 08:09 AM      , Platelets:   Lab Results   Component Value Date/Time    Platelets 380 56/58/3562 08:09 AM      , 1 hour Glucola:   Lab Results   Component Value Date/Time    Glucose 94 06/15/2020 08:09 AM    Rubella:   Lab Results   Component Value Date/Time    Rubella IgG Quant >175 0 02/06/2020 01:31 PM        , VDRL/RPR:   Lab Results   Component Value Date/Time    RPR Non-Reactive 06/15/2020 08:09 AM      , Urine Culture/Screen:   Lab Results   Component Value Date/Time    Urine Culture 10,000-19,000 cfu/ml  02/06/2020 01:31 PM   Hep B:   Lab Results   Component Value Date/Time    Hepatitis B Surface Ag Non-reactive 02/06/2020 01:31 PM    HIV:   Lab Results   Component Value Date/Time    HIV-1/HIV-2 Ab Non-Reactive 02/06/2020 01:31 PM    Chlamydia:  Negative  Gonorrhea:   Lab Results   Component Value Date/Time    N gonorrhoeae, DNA Probe Negative 01/29/2020 04:18 PM     , Group B Strep:    Lab Results   Component Value Date/Time    Strep Grp B PCR Negative for Beta Hemolytic Strep Grp B by PCR 08/13/2020 04:52 PM          Invasive Devices     None

## 2020-09-11 NOTE — DISCHARGE SUMMARY
Discharge Summary - Rosalina Sampson 27 y o  female MRN: [id number]    Unit/Bed#: L&D 323-01 Encounter: [id number]    Admission Date: 2020     Discharge Date:       Admitting Diagnosis:   Patient Active Problem List   Diagnosis    Fetal ultrasound    Genetic screening    Contraception management    39 weeks gestation of pregnancy    41 weeks gestation of pregnancy    Currently pregnant         Discharge Diagnosis:   Same, delivered    Procedures:   spontaneous vaginal delivery    Admitting Attending: Dr Anaya Files, DO  Delivery Attending: Dr Jasper Peace MD  Discharge Attending: Dr Boggs Angela:     Rosalina Sampson is a 27 y o  [id number] 41w0d   She presented to labor and delivery for labor  Her pregnancy was uncomplicated  On exam in triage she was noted to be 5 5/90/-2  She was admitted for labor  Amniotomy was performed at 1115 for  clear fluid  She continued to make change and progressed to be complete at 2:58 p m  And started pushing at 3:01 p m  She delivered a viable male  on 20 at 1506  Weight 8lbs 8oz via spontaneous vaginal delivery  Apgars were 9 (1 min) and 9 (5 min)   was transferred to [address] [place] [address]  Patient tolerated the procedure well and was transferred to recovery in stable condition  Her post-partum course was complicated by nothing  Her post-partum pain was well controlled with oral analgesics  The patient's post partum course was unremarkable  On day of discharge, she was ambulating and able to reasonably perform all ADLs  She was voiding and had appropriate bowel function  Pain was well controlled  She was discharged home on postpartum day #1 without complications  Patient was instructed to follow up with her OB as an outpatient and was given appropriate warnings to call provider if she develops signs of infection or uncontrolled pain      On day of discharge she was ambulating, voiding spontaneously, tolerating oral intake and hemodynamically stable  Mom's blood type is O positive and  Rhogam was not given  Condition at discharge:   good     Disposition:   See After Visit Summary for discharge disposition information  Planned Readmission:   No    Discharge Medications:   Prenatal vitamin daily for 6 months or the duration of nursing whichever is longer  Motrin 600 mg orally every 6 hours as needed for pain  Tylenol (over the counter) per bottle directions as needed for pain  Hydrocortisone cream 1% (over the counter) applied 1-2x daily to hemorrhoids as needed  Witch hazel pads for hemorrhoidal discomfort as needed      Discharge instructions :   -Do not place anything (no partner, tampons or douche) in your vagina for 6 weeks  -You may walk for exercise for the first 6 weeks then gradually return to your usual activities    -Please do not drive for 1 week if you have no stitches and for 2 weeks if you have stitches or underwent a  delivery     -You may take baths or shower per your preference    -Please look at your bust (breasts) in the mirror daily and call provider for redness or tenderness or increased warmth  - If you have had a  section please look at your incision daily as well and call provider for increasing redness or steady drainage from the incision    -Please call your provider if temperature > 100 4*F or 38* C, worsening pain or a foul discharge

## 2020-09-11 NOTE — OB LABOR/OXYTOCIN SAFETY PROGRESS
Labor Progress Note - Yarelis Lloyd 27 y o  female MRN: [id number]    Unit/Bed#: L&D 323-01 Encounter: [id number]       Contraction Frequency (minutes): 2-4  Contraction Quality: Moderate  Tachysystole: No   Cervical Dilation: 8        Cervical Effacement: 90  Fetal Station: -1  Baseline Rate: 145 bpm                     Vital Signs:   Vitals:    09/11/20 1107   BP: 113/59   Pulse: 100   Resp:    Temp:    SpO2:            Notes/comments:    - SVE as noted above  - AROM for clear fluid  - Discussed with patient when she starts feeling more rectal pressure to let us know as she went fast with her prior pregnancy  - Spoke with grandmother in the room who is a pediatrician in the [facility], she asked if the pediatricians usually come in at the time of delivery but we stated that as Darrion Taylor has no other complications the nurse resuscitators will be present  - Will continue to monitor    Aram Bolden DO 9/11/2020 11:19 AM

## 2020-09-11 NOTE — LACTATION NOTE
This note was copied from a baby's chart  Met with mother  Provided mother with Ready, Set, Baby booklet  Discussed Skin to Skin contact an benefits to mom and baby  Talked about the delay of the first bath until baby has adjusted  Spoke about the benefits of rooming in  Feeding on cue and what that means for recognizing infant's hunger  Avoidance of pacifiers for the first month discussed  Talked about exclusive breastfeeding for the first 6 months  Positioning and latch reviewed as well as showing images of other feeding positions  Discussed the properties of a good latch in any position  Reviewed hand/manual expression  Discussed s/s that baby is getting enough milk and some s/s that breastfeeding dyad may need further help  Gave information on common concerns, what to expect the first few weeks after delivery, preparing for other caregivers, and how partners can help  Resources for support also provided  Mother verbalized breastfeeding is going well  She plans to do both breast and bottle feed formula  I enc excl  breastfeeding for at least 3 weeks  I explained the risks of early supplementation and explained this is why she had supply issues with her first child  Enc to call for assistance as needed,phone # given

## 2020-09-11 NOTE — OB LABOR/OXYTOCIN SAFETY PROGRESS
Labor Progress Note - Davidteen Reveal 27 y o  female MRN: [id number]    Unit/Bed#: L&D 323-01 Encounter: [id number]                 Cervical Dilation: 6        Cervical Effacement: 80  Fetal Station: -2                        Vital Signs:   Vitals:    09/11/20 0800   BP: 105/61   Pulse:    Resp:    Temp:    SpO2:            Notes/comments:   - Lauryn Ojedat is resting comfortably with epidural  - SVE as noted above  - Will AROM next check once labor floor is safe to do so    Jennifer Holliday DO 9/11/2020 8:03 AM

## 2020-09-12 PROCEDURE — 99024 POSTOP FOLLOW-UP VISIT: CPT | Performed by: OBSTETRICS & GYNECOLOGY

## 2020-09-12 RX ORDER — ACETAMINOPHEN 325 MG/1
650 TABLET ORAL EVERY 4 HOURS PRN
Qty: 30 TABLET | Refills: 0 | Status: SHIPPED | OUTPATIENT
Start: 2020-09-12 | End: 2020-10-13

## 2020-09-12 RX ADMIN — IBUPROFEN 600 MG: 600 TABLET ORAL at 09:56

## 2020-09-12 NOTE — PROGRESS NOTES
Progress Note - OB/GYN   Jimena Mittal 27 y o  female MRN: [id number]  Unit/Bed#: L&D 314-01 Encounter: [id number]    Assessment:  27 y o  [id number] s/p Spontaneous Vaginal Delivery Postpartum day  1  Patient recovering well, Stable    PPD #1  Continue routine post partum care  Pain management PRN  Encourage ambulation  Encourage breastfeeding    Contraception  Desires tubal ligation, MA 31 signed 6/25/20  Desires Depo Provera for bridge contraception    Subjective/Objective   Chief Complaint:    Postpartum state    Subjective:   Pain:  Denies any pain  Tolerating PO: yes  Voiding: yes  Flatus: yes  BM: no  Ambulating: yes  Breastfeeding:  No, bottle feeding  Chest pain: no  Shortness of breath: no  Leg pain: no  Lochia: minimal    Objective:     Vitals: Temp:  [97 2 °F (36 2 °C)-99 °F (37 2 °C)] 98 6 °F (37 °C)  HR:  [] 77  Resp:  [16-18] 16  BP: ()/(51-73) 100/59       Intake/Output Summary (Last 24 hours) at 9/12/2020 0405  Last data filed at 9/11/2020 2232  Gross per 24 hour   Intake --   Output 2855 ml   Net -2855 ml     Physical Exam:   General: NAD, alert, oriented  Cardio: Regular rate and rhythm, no murmur  Resp: nonlabored breathing, clear to auscultation bilaterally  Abdomen: Soft, no distension/rebound/guarding/tenderness   Fundus: Firm, non-tender, fundus: 1 cm below umbilicus  G/U:  lochia noted on pad  Lower Extremities: Non-tender, no palpable cords    Medications:  Current Facility-Administered Medications   Medication Dose Route Frequency    acetaminophen (TYLENOL) tablet 650 mg  650 mg Oral Q4H PRN    benzocaine-menthol-lanolin-aloe (DERMOPLAST) 20-0 5 % topical spray   Topical 4x Daily PRN    calcium carbonate (TUMS) chewable tablet 1,000 mg  1,000 mg Oral Daily PRN    diphenhydrAMINE (BENADRYL) tablet 25 mg  25 mg Oral Q6H PRN    hydrocortisone 1 % cream 1 application  1 application Topical 4x Daily PRN    ibuprofen (MOTRIN) tablet 600 mg  600 mg Oral Q4H PRN    ondansetron [facility]) injection 4 mg  4 mg Intravenous Q8H PRN    simethicone (MYLICON) chewable tablet 80 mg  80 mg Oral 4x Daily PRN    witch hazel-glycerin (TUCKS) topical pad 1 pad  1 pad Topical Q2H PRN       Labs:   Recent Results (from the past 24 hour(s))   Type and screen    Collection Time: 09/11/20  6:53 AM   Result Value Ref Range    ABO Grouping O     Rh Factor Positive     Antibody Screen Negative     Specimen Expiration Date 20200914    CBC    Collection Time: 09/11/20  6:53 AM   Result Value Ref Range    WBC 15 92 (H) 4 31 - 10 16 Thousand/uL    RBC 4 30 3 81 - 5 12 Million/uL    Hemoglobin 13 0 11 5 - 15 4 g/dL    Hematocrit 39 3 34 8 - 46 1 %    MCV 91 82 - 98 fL    MCH 30 2 26 8 - 34 3 pg    MCHC 33 1 31 4 - 37 4 g/dL    RDW 13 8 11 6 - 15 1 %    Platelets 923 080 - 167 Thousands/uL    MPV 10 5 8 9 - 12 7 fL   Blood gas, venous, cord    Collection Time: 09/11/20  3:07 PM   Result Value Ref Range    pH, Cord Juan Diego 7 380 7 190 - 7 490    pCO2, Cord Juan Diego 34 3 27 0 - 43 0 mm HG    pO2, Cord Juan Diego 36 7 15 0 - 45 0 mm HG    HCO3, Cord Juan Diego 19 8 12 2 - 28 6 mmol/L    Base Exc, Cord Juan Diego -4 3 (L) 1 0 - 9 0 mmol/L    O2 Cont, Cord Juan Diego 18 5 mL/dL    O2 HGB,VENOUS CORD 81 6 %         Adry Pettit  9/12/2020  4:05 AM

## 2020-09-12 NOTE — LACTATION NOTE
This note was copied from a baby's chart  Met with mother to go over discharge breastfeeding booklet including the feeding log  Emphasized 8 or more (12) feedings in a 24 hour period, what to expect for the number of diapers per day of life and the progression of properties of the  stooling pattern  Greek and [place] booklet provided  Reviewed breastfeeding and your lifestyle, storage and preparation of breast milk, how to keep you breast pump clean, the employed breastfeeding mother and paced bottle feeding handouts  Booklet included Breastfeeding Resources for after discharge including access to the number for the [address]  Mom asked if she had to wait an hour after eating to feed the baby  Mom states that she had heard that in her country  Encouraged mom to observe for feeding cues which were reviewed and feed baby every 2-3 hours and whenever baby shows cues  Explained there is no need to wait to feed and could also eat while feeding that there are no restrictions  Encoraged MOB  to call for assistance, questions and concerns  Extension number for inpatient lactation support provided  Mother verbalized breastfeeding is going well  Enc to call for assistance as needed,phone # given

## 2020-09-13 VITALS
HEIGHT: 64 IN | SYSTOLIC BLOOD PRESSURE: 99 MMHG | BODY MASS INDEX: 30.73 KG/M2 | HEART RATE: 80 BPM | RESPIRATION RATE: 18 BRPM | DIASTOLIC BLOOD PRESSURE: 62 MMHG | TEMPERATURE: 98 F | OXYGEN SATURATION: 98 % | WEIGHT: 180 LBS

## 2020-09-13 PROCEDURE — 99024 POSTOP FOLLOW-UP VISIT: CPT | Performed by: OBSTETRICS & GYNECOLOGY

## 2020-09-13 RX ORDER — MEDROXYPROGESTERONE ACETATE 150 MG/ML
150 INJECTION, SUSPENSION INTRAMUSCULAR ONCE
Qty: 1 ML | Refills: 0 | Status: SHIPPED | OUTPATIENT
Start: 2020-09-13 | End: 2020-10-13

## 2020-09-13 RX ORDER — MEDROXYPROGESTERONE ACETATE 150 MG/ML
150 INJECTION, SUSPENSION INTRAMUSCULAR ONCE
Status: COMPLETED | OUTPATIENT
Start: 2020-09-13 | End: 2020-09-13

## 2020-09-13 RX ADMIN — MEDROXYPROGESTERONE ACETATE 150 MG: 150 INJECTION, SUSPENSION, EXTENDED RELEASE INTRAMUSCULAR at 10:13

## 2020-09-13 NOTE — PROGRESS NOTES
Progress Note - OB/GYN   Aquiles Fuentes 27 y o  female MRN: [id number]  Unit/Bed#: L&D 314-01 Encounter: [id number]    Assessment:  Post partum Day #2 s/p , stable, baby in room    Plan:    1) Continue routine post partum care   Contraception: depo as bridge to BTL (MA signed 20)   Encourage ambulation   Encourage breastfeeding   Anticipate discharge today     Subjective/Objective   Chief Complaint:     Post delivery  Patient is doing well  Lochia WNL  Pain well controlled  Subjective:     Pain: yes, cramping, improved with meds  Tolerating PO: yes  Voiding: yes  Flatus: yes  Ambulating: yes  Chest pain: no  Shortness of breath: no  Leg pain: no  Lochia: minimal    Objective:     Vitals: BP 92/55 (BP Location: Right arm)   Pulse 77   Temp 98 9 °F (37 2 °C) (Oral)   Resp 16   Ht 5' 4" (1 626 m)   Wt 81 6 kg (180 lb)   LMP  (LMP Unknown)   SpO2 97%   Breastfeeding Yes   BMI 30 90 kg/m²     No intake or output data in the 24 hours ending 20 0637    Lab Results   Component Value Date    WBC 15 92 (H) 2020    HGB 13 0 2020    HCT 39 3 2020    MCV 91 2020     2020       Physical Exam:     Gen: AAOx3, NAD  CV: RRR  Lungs: CTA b/l  Abd: Soft, non-tender, non-distended, no rebound or guarding  Uterine fundus firm and non-tender, 1 cm below the umbilicus     Ext: Non tender    Bev Modi MD  2020  6:37 AM

## 2020-09-13 NOTE — DISCHARGE INSTRUCTIONS
WHAT YOU NEED TO KNOW:   A , or  section, is abdominal surgery to deliver your baby  DISCHARGE INSTRUCTIONS:   Call 911 for any of the following:   · You feel lightheaded, short of breath, and have chest pain  · You cough up blood  Seek care immediately if:   · Blood soaks through your bandage  · Your stitches come apart  · Your arm or leg feels warm, tender, and painful  It may look swollen and red  Contact your OB if:   · You have heavy vaginal bleeding that fills 1 or more sanitary pads in 1 hour  · You have a fever  · Your incision is swollen, red, or draining pus  · You have questions or concerns about yourself or your baby  Medicines: You may  need any of the following:  · Prescription pain medicine  may be given  Ask how to take this medicine safely  · Acetaminophen  decreases pain and fever  It is available without a doctor's order  Ask how much to take and how often to take it  Follow directions  Acetaminophen can cause liver damage if not taken correctly  · NSAIDs , such as ibuprofen, help decrease swelling, pain, and fever  NSAIDs can cause stomach bleeding or kidney problems in certain people  If you take blood thinner medicine, always ask your healthcare provider if NSAIDs are safe for you  Always read the medicine label and follow directions  · Take your medicine as directed  Contact your healthcare provider if you think your medicine is not helping or if you have side effects  Tell him or her if you are allergic to any medicine  Keep a list of the medicines, vitamins, and herbs you take  Include the amounts, and when and why you take them  Bring the list or the pill bottles to follow-up visits  Carry your medicine list with you in case of an emergency  Wound care:  Carefully wash your wound with soap and water every day  Keep your wound clean and dry  Wear loose, comfortable clothes that do not rub against your wound   Ask your OB about bathing and showering  Limit activity as directed:   · Ask when it is safe for you to drive, walk up stairs, lift heavy objects, and have sex  · Ask when it is okay to exercise, and what types of exercise to do  Start slowly and do more as you get stronger  Drink liquids as directed:  Liquids help keep you hydrated after your procedure and decrease your risk for a blood clot  Ask how much liquid to drink each day and which liquids are best for you  Follow up with your OB as directed: You may need to return to have your stitches or staples removed  Write down your questions so you remember to ask them during your visits  © 2017 [address] [employer] Information is for End User's use only and may not be sold, redistributed or otherwise used for commercial purposes  All illustrations and images included in CareNotes® are the copyrighted property of [employer]  or Juan Antonio Antonio  The above information is an  only  It is not intended as medical advice for individual conditions or treatments  Talk to your doctor, nurse or pharmacist before following any medical regimen to see if it is safe and effective for you

## 2020-09-14 LAB — RPR SER QL: NORMAL

## 2020-09-14 NOTE — UTILIZATION REVIEW
Notification of Maternity/Delivery & [place] Birth Information for Admission   Notification of Maternity/Delivery for Admission to our facility [facility]  Be advised that this patient was admitted to our facility under Inpatient Status  Contact Keyonna Soriano at [phone] for additional admission information  [facility] PARENT/CHILD HE[facility] DEPT  DEDICATED -[phone]  Mother &  Information   Patient Name: Waldemar Ernandez YOB: 1990   Delivering clinician: [facility]   OB History        2    Para   2    Term   2       0    AB   0    Living   2       SAB   0    TAB   0    Ectopic   0    Multiple   0    Live Births   2                Name & MRN:   Information for the patient's :  Triny Curiel [[id number]]     [place] Delivery Information:  Sex: male  Delivered 2020 3:06 PM by Vaginal, Spontaneous; Gestational Age: 37w0d     Measurements:  Weight: 8 lb 8 oz (3856 g); Height: 20 5"    APGAR 1 minute 5 minutes 10 minutes   Totals: 9 9       Birth Information: 27 y o  female MRN: [id number] Unit/Bed#: L&D 314-01 Estimated Date of Delivery: 20  Birthweight: No birth weight on file  Gestational Age: <None> Delivery Type: Vaginal, Spontaneous          APGARS  One minute Five minutes Ten minutes   Totals:                 [address]   [address]:   Ifeanyi Tyson  Tax ID: [id number]  NPI: [id number] Attending Provider/NPI:  Phone:  Address: Karly Concepcion [[id number]]  [phone]  Same as Merry Ghosh 1106 of Service Code: 24 Place of Service Name: [address] [place] [address]   Start Date: 20   Discharge Date & Time: 2020 10:49 AM    Type of Admission: Inpatient Status Discharge Disposition (if discharged): Home/Self Care   Patient Diagnoses:   No admission diagnoses are documented for this encounter    The primary encounter diagnosis was Lactating mother  Diagnoses of 41 weeks gestation of pregnancy,  (spontaneous vaginal delivery), and Encounter for other general counseling or advice on contraception were also pertinent to this visit  1  Lactating mother    2  41 weeks gestation of pregnancy    3   (spontaneous vaginal delivery)    4  Encounter for other general counseling or advice on contraception       Orders: Admission Orders (From admission, onward)     Ordered        20 0648  Inpatient Admission  Once                    Assigned Utilization Review Contact: Jc Max Utilization Review Department  Phone: [phone]; Fax [phone]  Email: [email or web address]

## 2020-09-17 ENCOUNTER — TELEPHONE (OUTPATIENT)
Dept: OBGYN CLINIC | Facility: CLINIC | Age: 30
End: 2020-09-17

## 2020-09-17 NOTE — TELEPHONE ENCOUNTER
Transition of Care Post Partum phone call:     Date of delivery: 2020  Weeks gestation: full term 41 weeks  Type of delivery: vaginal delivery  Sex of child: male    Birth weight: 3856 gm     Perineum laceration: Yes   How are you feeling: Patient reports feeling well, depo given at hospital   Vaginal bleeding status: light  Urinary status: normal voiding  Bowel movement : constipated  Colace ordered and tips for coping with constipation during post partum reviewed with patient    Pain control: acetaminophen  [place] feeding: Bottle feeding   Any baby blues: No  Scheduled for follow-up appointment: 2020

## 2020-09-18 DIAGNOSIS — K59.00 CONSTIPATION, UNSPECIFIED CONSTIPATION TYPE: Primary | ICD-10-CM

## 2020-09-18 RX ORDER — DOCUSATE SODIUM 100 MG/1
100 CAPSULE, LIQUID FILLED ORAL 2 TIMES DAILY
Qty: 30 CAPSULE | Refills: 2 | Status: SHIPPED | OUTPATIENT
Start: 2020-09-18 | End: 2020-10-13

## 2020-09-19 LAB — PLACENTA IN STORAGE: NORMAL

## 2020-10-13 ENCOUNTER — POSTPARTUM VISIT (OUTPATIENT)
Dept: OBGYN CLINIC | Facility: CLINIC | Age: 30
End: 2020-10-13

## 2020-10-13 VITALS
WEIGHT: 164.4 LBS | TEMPERATURE: 97.6 F | DIASTOLIC BLOOD PRESSURE: 74 MMHG | SYSTOLIC BLOOD PRESSURE: 122 MMHG | BODY MASS INDEX: 28.22 KG/M2 | HEART RATE: 80 BPM

## 2020-10-13 DIAGNOSIS — Z13.31 POSITIVE DEPRESSION SCREENING: ICD-10-CM

## 2020-10-13 PROBLEM — Z3A.41 41 WEEKS GESTATION OF PREGNANCY: Status: RESOLVED | Noted: 2020-09-11 | Resolved: 2020-10-13

## 2020-10-13 PROBLEM — Z34.90 CURRENTLY PREGNANT: Status: RESOLVED | Noted: 2020-09-11 | Resolved: 2020-10-13

## 2020-10-13 PROBLEM — Z13.79 GENETIC SCREENING: Status: RESOLVED | Noted: 2020-02-12 | Resolved: 2020-10-13

## 2020-10-13 PROBLEM — Z3A.39 39 WEEKS GESTATION OF PREGNANCY: Status: RESOLVED | Noted: 2020-06-04 | Resolved: 2020-10-13

## 2020-10-13 PROBLEM — Z36.9 ENCOUNTER FOR FETAL ULTRASOUND: Status: RESOLVED | Noted: 2020-02-12 | Resolved: 2020-10-13

## 2020-10-13 PROBLEM — O48.0 41 WEEKS GESTATION OF PREGNANCY: Status: RESOLVED | Noted: 2020-09-11 | Resolved: 2020-10-13

## 2020-10-13 PROCEDURE — 99213 OFFICE O/P EST LOW 20 MIN: CPT | Performed by: NURSE PRACTITIONER

## 2020-10-13 PROCEDURE — 1036F TOBACCO NON-USER: CPT | Performed by: NURSE PRACTITIONER

## 2020-10-14 ENCOUNTER — PATIENT OUTREACH (OUTPATIENT)
Dept: OBGYN CLINIC | Facility: CLINIC | Age: 30
End: 2020-10-14

## 2020-10-19 ENCOUNTER — TELEPHONE (OUTPATIENT)
Dept: OBGYN CLINIC | Facility: CLINIC | Age: 30
End: 2020-10-19

## 2020-10-26 DIAGNOSIS — N91.2 AMENORRHEA: ICD-10-CM

## 2020-10-26 RX ORDER — PRENATAL VIT NO.126/IRON/FOLIC 28MG-0.8MG
1 TABLET ORAL
Qty: 90 TABLET | Refills: 3 | Status: SHIPPED | OUTPATIENT
Start: 2020-10-26 | End: 2021-05-25

## 2020-11-09 ENCOUNTER — TELEPHONE (OUTPATIENT)
Dept: OBGYN CLINIC | Facility: CLINIC | Age: 30
End: 2020-11-09

## 2020-11-12 ENCOUNTER — TELEPHONE (OUTPATIENT)
Dept: OBGYN CLINIC | Facility: CLINIC | Age: 30
End: 2020-11-12

## 2020-11-16 ENCOUNTER — TELEPHONE (OUTPATIENT)
Dept: OBGYN CLINIC | Facility: CLINIC | Age: 30
End: 2020-11-16

## 2020-11-16 DIAGNOSIS — Z30.09 UNWANTED FERTILITY: Primary | ICD-10-CM

## 2020-11-16 RX ORDER — MEDROXYPROGESTERONE ACETATE 150 MG/ML
150 INJECTION, SUSPENSION INTRAMUSCULAR
Qty: 1 ML | Refills: 3 | Status: SHIPPED | OUTPATIENT
Start: 2020-11-16 | End: 2021-05-25

## 2020-11-20 ENCOUNTER — OFFICE VISIT (OUTPATIENT)
Dept: FAMILY MEDICINE CLINIC | Facility: CLINIC | Age: 30
End: 2020-11-20

## 2020-11-20 VITALS
HEART RATE: 93 BPM | BODY MASS INDEX: 28.56 KG/M2 | RESPIRATION RATE: 18 BRPM | DIASTOLIC BLOOD PRESSURE: 68 MMHG | TEMPERATURE: 98.1 F | SYSTOLIC BLOOD PRESSURE: 104 MMHG | HEIGHT: 64 IN | WEIGHT: 167.3 LBS | OXYGEN SATURATION: 97 %

## 2020-11-20 DIAGNOSIS — Z23 ENCOUNTER FOR ADMINISTRATION OF VACCINE: ICD-10-CM

## 2020-11-20 DIAGNOSIS — Z02.4 DRIVER'S PERMIT PHYSICAL EXAMINATION: Primary | ICD-10-CM

## 2020-11-20 PROCEDURE — 3725F SCREEN DEPRESSION PERFORMED: CPT | Performed by: FAMILY MEDICINE

## 2020-11-20 PROCEDURE — 1036F TOBACCO NON-USER: CPT | Performed by: FAMILY MEDICINE

## 2020-11-20 PROCEDURE — 99213 OFFICE O/P EST LOW 20 MIN: CPT | Performed by: FAMILY MEDICINE

## 2020-11-30 ENCOUNTER — CLINICAL SUPPORT (OUTPATIENT)
Dept: OBGYN CLINIC | Facility: CLINIC | Age: 30
End: 2020-11-30

## 2020-11-30 VITALS
SYSTOLIC BLOOD PRESSURE: 109 MMHG | DIASTOLIC BLOOD PRESSURE: 76 MMHG | WEIGHT: 169.6 LBS | HEIGHT: 64 IN | BODY MASS INDEX: 28.95 KG/M2 | HEART RATE: 99 BPM

## 2020-11-30 DIAGNOSIS — Z30.09 UNWANTED FERTILITY: Primary | ICD-10-CM

## 2020-11-30 LAB — SL AMB POCT URINE HCG: NEGATIVE

## 2020-11-30 PROCEDURE — 81025 URINE PREGNANCY TEST: CPT

## 2020-11-30 PROCEDURE — 3008F BODY MASS INDEX DOCD: CPT | Performed by: FAMILY MEDICINE

## 2020-11-30 PROCEDURE — 96372 THER/PROPH/DIAG INJ SC/IM: CPT

## 2020-11-30 RX ORDER — MEDROXYPROGESTERONE ACETATE 150 MG/ML
150 INJECTION, SUSPENSION INTRAMUSCULAR ONCE
Status: COMPLETED | OUTPATIENT
Start: 2020-11-30 | End: 2020-11-30

## 2020-11-30 RX ADMIN — MEDROXYPROGESTERONE ACETATE 150 MG: 150 INJECTION, SUSPENSION INTRAMUSCULAR at 16:13

## 2020-12-08 ENCOUNTER — OFFICE VISIT (OUTPATIENT)
Dept: URGENT CARE | Facility: MEDICAL CENTER | Age: 30
End: 2020-12-08
Payer: COMMERCIAL

## 2020-12-08 VITALS
BODY MASS INDEX: 28.85 KG/M2 | HEART RATE: 100 BPM | HEIGHT: 64 IN | RESPIRATION RATE: 16 BRPM | WEIGHT: 169 LBS | OXYGEN SATURATION: 99 % | TEMPERATURE: 99.6 F

## 2020-12-08 DIAGNOSIS — B34.9 ACUTE VIRAL SYNDROME: Primary | ICD-10-CM

## 2020-12-08 PROCEDURE — U0003 INFECTIOUS AGENT DETECTION BY NUCLEIC ACID (DNA OR RNA); SEVERE ACUTE RESPIRATORY SYNDROME CORONAVIRUS 2 (SARS-COV-2) (CORONAVIRUS DISEASE [COVID-19]), AMPLIFIED PROBE TECHNIQUE, MAKING USE OF HIGH THROUGHPUT TECHNOLOGIES AS DESCRIBED BY CMS-2020-01-R: HCPCS | Performed by: FAMILY MEDICINE

## 2020-12-08 PROCEDURE — 99213 OFFICE O/P EST LOW 20 MIN: CPT | Performed by: FAMILY MEDICINE

## 2020-12-10 LAB — SARS-COV-2 RNA SPEC QL NAA+PROBE: DETECTED

## 2020-12-12 ENCOUNTER — TELEPHONE (OUTPATIENT)
Dept: CARDIOLOGY CLINIC | Facility: CLINIC | Age: 30
End: 2020-12-12

## 2020-12-16 ENCOUNTER — NURSE TRIAGE (OUTPATIENT)
Dept: OTHER | Facility: OTHER | Age: 30
End: 2020-12-16

## 2020-12-18 ENCOUNTER — TELEMEDICINE (OUTPATIENT)
Dept: FAMILY MEDICINE CLINIC | Facility: CLINIC | Age: 30
End: 2020-12-18

## 2020-12-18 DIAGNOSIS — U07.1 COVID-19 VIRUS INFECTION: Primary | ICD-10-CM

## 2020-12-18 PROCEDURE — 99213 OFFICE O/P EST LOW 20 MIN: CPT | Performed by: FAMILY MEDICINE

## 2021-02-25 ENCOUNTER — ANNUAL EXAM (OUTPATIENT)
Dept: OBGYN CLINIC | Facility: CLINIC | Age: 31
End: 2021-02-25

## 2021-02-25 VITALS
WEIGHT: 168 LBS | HEART RATE: 89 BPM | DIASTOLIC BLOOD PRESSURE: 73 MMHG | BODY MASS INDEX: 28.68 KG/M2 | SYSTOLIC BLOOD PRESSURE: 110 MMHG | HEIGHT: 64 IN

## 2021-02-25 DIAGNOSIS — Z01.419 ENCOUNTER FOR ANNUAL ROUTINE GYNECOLOGICAL EXAMINATION: Primary | ICD-10-CM

## 2021-02-25 DIAGNOSIS — Z30.42 DEPO-PROVERA CONTRACEPTIVE STATUS: ICD-10-CM

## 2021-02-25 PROCEDURE — S0612 ANNUAL GYNECOLOGICAL EXAMINA: HCPCS | Performed by: OBSTETRICS & GYNECOLOGY

## 2021-02-25 PROCEDURE — 1036F TOBACCO NON-USER: CPT | Performed by: OBSTETRICS & GYNECOLOGY

## 2021-02-25 PROCEDURE — 96372 THER/PROPH/DIAG INJ SC/IM: CPT | Performed by: OBSTETRICS & GYNECOLOGY

## 2021-02-25 PROCEDURE — 3008F BODY MASS INDEX DOCD: CPT | Performed by: OBSTETRICS & GYNECOLOGY

## 2021-02-25 RX ORDER — MEDROXYPROGESTERONE ACETATE 150 MG/ML
150 INJECTION, SUSPENSION INTRAMUSCULAR ONCE
Status: COMPLETED | OUTPATIENT
Start: 2021-02-25 | End: 2021-02-25

## 2021-02-25 RX ADMIN — MEDROXYPROGESTERONE ACETATE 150 MG: 150 INJECTION, SUSPENSION INTRAMUSCULAR at 16:38

## 2021-02-25 NOTE — PATIENT INSTRUCTIONS
Visita de bienestar para los adultos   CUIDADO AMBULATORIO:   Rajiv visita de bienestar es cuando usted acude con un médico para que le adelaida exámenes de detección de problemas de [place]  Patel médico también le dará consejos sobre cómo mantenerse saludable  Anote kana preguntas para que se acuerde de hacerlas  Pregunte a patel médico con qué frecuencia debería realizarse rajiv visita de bienestar  Lo que sucede en rajiv visita de bienestar: Patel médico le preguntará sobre patel danish y patel historia familiar [id number]  Sedillo incluye presión arterial randi, enfermedades del corazón y cáncer  El médico le preguntará si tiene síntomas que le preocupen, si [facility] y [place] de ánimo  También se le preguntará acerca del uso de medicamentos, suplementos, alimentos y alcohol  Es posible que le adelaida cualquiera de lo siguiente:  · Patel peso será revisado  Es posible que [facility] midan patel altura para calcular patel índice de masa corporal [facility]  El [facility] indica si tiene un peso saludable  · Se verificarán patel presión arterial y frecuencia cardíaca  También pueden revisar patel temperatura  · Exámenes de [place] y [facility] podría realizarse  Se podrían realizar exámenes de radha para revisar los niveles de [facility]  Los niveles anormales de colesterol aumentan el riesgo de enfermedad del corazón y accidente cerebrovascular  Puede que también necesite rajiv prueba de radha u orina para revisar si tiene diabetes si usted está en mayor riesgo  Las pruebas de orina pueden hacerse para buscar signos de rajiv infección o rajiv enfermedad renal     · Un examen físico incluye la comprobación de kana latidos del corazón y los pulmones con un estetoscopio  Patel médico también podría revisarle la piel para buscar daños causados por el sol  · Pruebas de detección podría recomendarse  Se realiza un examen de detección para detectar enfermedades que pueden no causar síntomas   Los exámenes de [place] necesite dependen de patel edad, [place], antecedentes familiares y hábitos de angelo  Por ejemplo, podrían recomendarle la exploración selectiva colorrectal si tiene 48 años o más  Si es [place], necesita los siguientes exámenes de detección:  · El examen de Papanicolaou se utiliza para detectar cáncer de veronika uterino  El examen del Papanicolaou por lo general se realiza entre 3 a 5 años dependiendo de patel edad  Puede necesitarlo más a menudo si usted ha tenido [employer] de la prueba de Papanicolaou en el pasado  Pregunte a patel médico con qué frecuencia debería realizarse un examen de Papanicolaou  · Marzella Lei es rajiv radiografía de kana mamas para detectar cáncer de mama  Los expertos recomiendan [address] cada 2 años empezando a los 48 años de [place]  Es probable que usted necesite Marzella Lei a los 52 años o antes si tiene riesgo alto de cáncer de seno  Hable con patel médico sobre cuándo debe empezar con kana mamografías y con cuánta frecuencia las necesita  Vacunas que podría necesitar:  · Debe recibir rajiv vacuna contra la influenza todos los [place]  La vacuna contra la influenza protege de la gripe  Varios tipos de virus causan la influenza  Debido a que los virus [place] con el [place], es necesaria la producción de nuevas vacunas cada año  · Debe recibir Jonne Gong vacuna de refuerzo contra el tétanos-difteria (Td) cada 10 años  Esta vacuna protege contra el tétanos y la difteria  El tétanos es rajiv infección severa que puede causar trismo y espasmos musculares dolorosos  La difteria es rajiv infección bacteriana grave que produce rajiv cubierta gruesa en la parte de atrás de la boca y garganta  · Debe recibir la vacuna contra el virus del papiloma humano (VPH) si usted es sharon y [place] 19 y 32 años o es hombre y [place] 23 y 24 años y nunca la recibió  Esta vacuna protege contra la infección por VPH   El virus del papiloma humano o VPH es la infección más común que se transmite por contacto sexual  El virus del papiloma humano también podría provocar cáncer vaginal, del pene y del ano  · Debe recibir la vacuna antineumocócica si tiene más de 72 años  La vacuna antineumocócica es rajiv inyección que se administra para protegerlo contra rajiv enfermedad neumocócica  La enfermedad neumocócica es rajiv infección causada por la bacteria neumocócica  La infección puede causar neumonía, meningitis o rajiv infección del oído  · Debe recibir la vacuna contra la culebrilla Si tiene 61 años o más, incluso si ha tenido culebrilla antes  La vacuna contra la culebrilla (herpes zóster) es rajiv inyección usada para proteger contra el virus zóster que causa la varicela  Margie es el mismo virus que causa la varicela  La culebrilla es un sarpullido doloroso que se desarrolla en personas que tuvieron varicela o hermosillo estado expuestas al virus  Cómo comer saludable: [place] es un modelo para planear comidas sanas  Muestra los tipos y cantidades de alimentos que deberían ir en un plato  [place] y verduras representan alrededor de la mitad de patel plato y los granos y proteínas representan la otra mitad  Se incluye rajiv porción de productos lácteos al lado del plato  La cantidad de calorías y [address] de las porciones que usted necesita dependen de patel edad, [place], peso y altura  Los ejemplos de alimentos saludables son:  · Consuma rajiv variedad de verduras yamel las de color fam oscuro, garrison y [place]  Usted también puede incluir verduras enlatadas bajas en sodio (sal) y verduras congeladas sin mantequilla ni salsas ICKHHBRA  · Consuma rajiv variedad de fruta frescas , las frutas enlatadas en 100% de jugo, fruta [place] y yamila secos  · Incluya granos enteros  Por lo menos la mitad de los granos que usted consume deben ser granos de mikel integral  Por ejemplo, panes de grano entero, pasta integral, arroz integral y cereales de grano entero yamel la asia      · Consuma rajiv variedad de alimentos con proteínas yamel mariscos (pescado y crustáceos), carne magra y carne de ave sin piel (pavo y albert)  Ejemplos de liat magras incluyen pierna, paleta o lomo de puerco y annette, solomillo o, lomo de res y carne [place] de res extra [place]  Otros alimentos ricos en proteínas son los huevos y sustitutos de [place], frijoles, chícharos, productos de soya, nueces y semillas  · Elija productos lácteos bajos en grasa [employer] o del 1% o yogur, queso y requesón bajos en grasa  · Limite las grasas poco saludables, yamel la [place], la margarina dura y la [place]  Ejercicio: Realice rajiv actividad física por lo menos 30 minutos al día, la mayoría de los días de la [place]  Algunos de los ejercicios incluyen caminar, montar en bicicleta, bailar y nadar  Usted también puede realizar más actividad física usando las escaleras en vez de los ascensores o estacionarse más lejos cuando Nancye Oka a las tiendas  Incluya ejercicios para fortalecer los músculos 2 días a la semana  El ejercicio regular proporciona muchos beneficios para la danish  [place] a controlar patel peso y [employer] riesgo de diabetes tipo 2, presión arterial randi, enfermedad del corazón y accidente cerebrovascular  El ejercicio [employer] puede ayudar a mejorar patel estado de ánimo  Pregunte a patel médico acerca del mejor plan de ejercicio para usted  Pautas generales de danish y seguridad:  · No fume  La nicotina y otras sustancias químicas que contienen los cigarrillos y cigarros pueden dañar los pulmones  Pida información a patel médico si usted actualmente fuma y necesita ayuda para dejar de fumar  Los cigarrillos electrónicos o el tabaco sin humo igualmente contienen nicotina  Consulte con patel médico antes de [employer]  · Limite el consumo de alcohol  Un trago equivale a 12 onzas de cerveza, 5 onzas de vino o 1 onza y ½ de licor  · Baje de peso, si es necesario  El sobrepeso aumenta el riesgo de ciertas condiciones de [place]   Estos incluyen enfermedad del corazón, presión arterial randi, diabetes tipo 2 y ciertos tipos de cáncer  · Proteja patel piel  No tome el sol ni use katelyn de bronceado  Use un protector solar con un FPS mayor a 13  Aplíquese el bloqueador por lo menos 15 minutos antes de que vaya a estar al [employer]  Vuelva a aplicarse la crema solar cada 2 horas  Use ropa protectora, sombrero y lentes para el sol cuando se encuentre afuera  · Conduzca con seguridad  Use siempre patel cinturón de seguridad  Asegúrese que todos en el aaron usan el cinturón de seguridad  Un cinturón de seguridad puede salvar patel angelo en emile de un accidente automovilístico  No use el celular cuando esté manejando  [facility] puede hacer que se distraiga y causar un accidente  Es mejor que pare y se orille si necesita hacer rajiv Nadine Mon un texto  · Practique el sexo seguro  Use condones de látex si es sexualmente [place] y tiene más de [place]  Patel médico puede recomendar exámenes de detección de infecciones de transmisión sexual (ITS)  · Use un emiliano, un chaleco salvavidas y unos implementos de protección  Siempre use un emiliano al [employer] en bicicleta o motocicleta, esquiar o jugar deportes que podrían causar rajiv lesión en la naz  Use implementos de protección cuando practique deportes  Use un chaleco salvavidas cuando esté en un bote o practicando actividades acuáticas  © Copyright [address] Information is for End User's use only and may not be sold, redistributed or otherwise used for commercial purposes  All illustrations and images included in CareNotes® are the copyrighted property of [employer] , [employer]  or [address] [place] [address] es sólo para uso en educación  Patel intención no es darle un consejo médico sobre enfermedades o tratamientos  Colsulte con patel Geraldine Clipper farmacéutico antes de seguir cualquier régimen médico para saber si es seguro y efectivo para usted

## 2021-02-25 NOTE — PROGRESS NOTES
Lucita Marin is a 27 y o  female who presents for annual well woman exam  Periods are irregular, the patient experiences spotting irregularly with her Depo Provera  This has been normal for her since she started Depo Provera  For this reason, she is interested in switching back to OCPs  She would like to receive her Depo shot today and return to clinic soon to discuss switching back to OCPs  GYN:  · Dnies vaginal discharge, labial erythema or lesions, dyspareunia  · Menses are irregular, spotting  · Contraception: Depo Provera  · Patient is sexually active with male partner  · Gynecologic surgery: denies    OB:  · [id number] female  :  · Denies dysuria, urinary frequency or urgency  · Denies hematuria, flank pain, incontinence  Breast:  · Denies breast mass, skin changes, dimpling, reddening, nipple retraction  · Denies breast discharge  · Patient denies a family history of breast, endometrial, or ovarian ca  General:  · Diet: daily vegetables and fruits  · Exercise: not much outside of taking care of her children  · Work: works as a tailor  · ETOH use: occasionally  · Tobacco use: no  · Recreational drug use: no    Screening:  · Cervical cancer: last pap smear 1/29/20  Results were NILM  · STD screening: declines  Review of Systems  Pertinent items are noted in HPI  Objective      /73 (BP Location: Left arm, Patient Position: Sitting, Cuff Size: Standard)   Pulse 89   Ht 5' 4" (1 626 m)   Wt 76 2 kg (168 lb)   BMI 28 84 kg/m²     Physical Exam  Exam conducted with a chaperone present  Constitutional:       Appearance: Normal appearance  HENT:      Head: Normocephalic and atraumatic  Neck:      Thyroid: No thyroid mass or thyroid tenderness  Cardiovascular:      Rate and Rhythm: Normal rate and regular rhythm  Pulmonary:      Effort: Pulmonary effort is normal       Breath sounds: Normal breath sounds     Chest:      Breasts:         Right: No swelling, bleeding, inverted nipple or mass  Left: No swelling, bleeding, inverted nipple or mass  Abdominal:      General: There is no distension  Palpations: Abdomen is soft  There is no mass  Tenderness: There is no abdominal tenderness  There is no guarding  Genitourinary:     Exam position: Lithotomy position  Labia:         Right: No rash, tenderness or lesion  Left: No rash, tenderness or lesion  Vagina: No signs of injury  No vaginal discharge or tenderness  Cervix: No cervical motion tenderness, friability, lesion, erythema or cervical bleeding  Adnexa: Right adnexa normal and left adnexa normal    Musculoskeletal: Normal range of motion  Skin:     General: Skin is warm and dry  Neurological:      Mental Status: She is alert and oriented to person, place, and time  Psychiatric:         Mood and Affect: Mood normal          Behavior: Behavior normal          Thought Content: Thought content normal          Judgment: Judgment normal                  Assessment     Tiarra Ahmadi is a 27 y o  [id number] with normal gynecologic exam      Plan   1  Routine well woman exam done today  2   Pap and HPV:Pap with HPV was not done today  Current ASCCP Guidelines reviewed  3   The patient declined STD testing  Safe sex practices have been discussed  4  The patient is sexually active  She is currently on Depo Provera  She does not use condoms and we discussed the importance of condom use to prevent STIs  5  The following were reviewed in today's visit: breast self exam, STD testing, exercise and healthy diet  6  Patient to return to office in 12 months for next annual or earlier if she wishes to switch contraception       Tala Elizabeth MD  PGY-1 OB/GYN   2/25/2021 4:23 PM

## 2021-05-25 ENCOUNTER — OFFICE VISIT (OUTPATIENT)
Dept: OBGYN CLINIC | Facility: CLINIC | Age: 31
End: 2021-05-25

## 2021-05-25 VITALS
DIASTOLIC BLOOD PRESSURE: 79 MMHG | SYSTOLIC BLOOD PRESSURE: 109 MMHG | HEART RATE: 82 BPM | HEIGHT: 64 IN | BODY MASS INDEX: 28.68 KG/M2 | WEIGHT: 168 LBS

## 2021-05-25 DIAGNOSIS — Z30.09 UNWANTED FERTILITY: Primary | ICD-10-CM

## 2021-05-25 PROBLEM — U07.1 COVID-19 VIRUS INFECTION: Status: RESOLVED | Noted: 2020-12-18 | Resolved: 2021-05-25

## 2021-05-25 PROBLEM — Z30.9 CONTRACEPTION MANAGEMENT: Status: RESOLVED | Noted: 2020-05-06 | Resolved: 2021-05-25

## 2021-05-25 PROCEDURE — 99213 OFFICE O/P EST LOW 20 MIN: CPT | Performed by: NURSE PRACTITIONER

## 2021-05-25 NOTE — PROGRESS NOTES
PROBLEM GYNECOLOGICAL VISIT    Aminata Kendrick is a 32 y o  female who presents today with desires to change contraceptive method  Her general medical history has been reviewed and she reports it as follows:    Past Medical History:   Diagnosis Date    Hyperlipidemia     previously tx with Simvastatin, no meds since 2017     Past Surgical History:   Procedure Laterality Date    NO PAST SURGERIES       OB History        2    Para   2    Term   2       0    AB   0    Living   2       SAB   0    TAB   0    Ectopic   0    Multiple   0    Live Births   2               Social History     Tobacco Use    Smoking status: Never Smoker    Smokeless tobacco: Never Used   Substance Use Topics    Alcohol use: Yes     Frequency: Monthly or less     Drinks per session: 1 or 2    Drug use: Never       No current outpatient medications    History of Present Illness:   Patient states she has been using Depoprovera since delivery of baby in 2020, and is unhappy with it because she is having difficulty losing weight since then  She has actually gained 3# since postpartum visit on 10/13/2020  She desires to use Paraguard IUD instead  She has not had her menses since prior to her last pregnancy and desires to have regular menses  She understands that Paragard may cause menses to be heavy and/or to have painful cramping  She states that she desires it anyway  Her last Depoprovera injection was administered 2021  Review of Systems:  Review of Systems   Constitutional: Positive for unexpected weight change  Gastrointestinal: Negative  Genitourinary: Positive for menstrual problem  Physical Exam:  /79   Pulse 82   Ht 5' 4" (1 626 m)   Wt 76 2 kg (168 lb)   BMI 28 84 kg/m²   Physical Exam  Constitutional:       General: She is not in acute distress  Neurological:      Mental Status: She is alert  Skin:     General: Skin is warm and dry  Vitals signs reviewed  Assessment:   1  Unwanted fertility  2  Weight gain  Plan:   1  Will obtain insurance authorization for Paragard IUD  2  Return to office in 1 week for insertion procedure

## 2021-06-03 ENCOUNTER — PROCEDURE VISIT (OUTPATIENT)
Dept: OBGYN CLINIC | Facility: CLINIC | Age: 31
End: 2021-06-03

## 2021-06-03 DIAGNOSIS — Z30.09 UNWANTED FERTILITY: Primary | ICD-10-CM

## 2021-06-03 PROCEDURE — 99212 OFFICE O/P EST SF 10 MIN: CPT | Performed by: NURSE PRACTITIONER

## 2021-06-03 NOTE — PROGRESS NOTES
Ken Maxwell presents today for Paragard IUD insertion  Patient's last Depoprovera injection was 2/25/21  She reports she had unprotected sexual intercourse 1 week ago  Advised patient that she needs to abstain from intercourse for 2 weeks and then return for pregnancy test   If negative, we can insert IUD at that time  She agrees

## 2021-06-17 ENCOUNTER — OFFICE VISIT (OUTPATIENT)
Dept: FAMILY MEDICINE CLINIC | Facility: CLINIC | Age: 31
End: 2021-06-17

## 2021-06-17 ENCOUNTER — OFFICE VISIT (OUTPATIENT)
Dept: OBGYN CLINIC | Facility: CLINIC | Age: 31
End: 2021-06-17

## 2021-06-17 VITALS
BODY MASS INDEX: 29.49 KG/M2 | DIASTOLIC BLOOD PRESSURE: 61 MMHG | WEIGHT: 171.8 LBS | SYSTOLIC BLOOD PRESSURE: 101 MMHG | HEART RATE: 84 BPM

## 2021-06-17 VITALS
RESPIRATION RATE: 16 BRPM | HEIGHT: 64 IN | WEIGHT: 168 LBS | OXYGEN SATURATION: 98 % | DIASTOLIC BLOOD PRESSURE: 80 MMHG | TEMPERATURE: 98 F | BODY MASS INDEX: 28.68 KG/M2 | SYSTOLIC BLOOD PRESSURE: 110 MMHG | HEART RATE: 92 BPM

## 2021-06-17 DIAGNOSIS — Z30.09 UNWANTED FERTILITY: Primary | ICD-10-CM

## 2021-06-17 DIAGNOSIS — E66.3 OVERWEIGHT: ICD-10-CM

## 2021-06-17 DIAGNOSIS — Z00.00 ANNUAL PHYSICAL EXAM: Primary | ICD-10-CM

## 2021-06-17 LAB — SL AMB POCT URINE HCG: NORMAL

## 2021-06-17 PROCEDURE — 99395 PREV VISIT EST AGE 18-39: CPT | Performed by: FAMILY MEDICINE

## 2021-06-17 PROCEDURE — 3008F BODY MASS INDEX DOCD: CPT | Performed by: FAMILY MEDICINE

## 2021-06-17 PROCEDURE — 99212 OFFICE O/P EST SF 10 MIN: CPT | Performed by: NURSE PRACTITIONER

## 2021-06-17 PROCEDURE — 81025 URINE PREGNANCY TEST: CPT | Performed by: NURSE PRACTITIONER

## 2021-06-17 PROCEDURE — 3725F SCREEN DEPRESSION PERFORMED: CPT | Performed by: FAMILY MEDICINE

## 2021-06-17 PROCEDURE — 1036F TOBACCO NON-USER: CPT | Performed by: FAMILY MEDICINE

## 2021-06-17 RX ORDER — NORETHINDRONE ACETATE AND ETHINYL ESTRADIOL 1MG-20(21)
1 KIT ORAL DAILY
Qty: 28 TABLET | Refills: 3 | Status: SHIPPED | OUTPATIENT
Start: 2021-06-17 | End: 2021-07-22 | Stop reason: SDUPTHER

## 2021-06-17 NOTE — PROGRESS NOTES
Krystle Hitchcock presents today for Paragard IUD insertion, but she reports that she continues to have unprotected sexual intercourse every few days and her  will not let her abstain for 2 weeks so that we can safely insert IUD  She is considering using OCP's instead  Urine pregnancy test negative today  Given Rx Junel Fe and given instruction on appropriate use  Return in 1 month and if she has been taking pills compliantly and pregnancy test is still negative, then we can consider inserting IUD  Or she may continue with OCP's if she desires  Patient agrees with plan

## 2021-06-17 NOTE — PROGRESS NOTES
[facility]    NAME: Araceli Barron  AGE: 32 y o  SEX: female  : 1990     DATE: 2021     Assessment and Plan:     Problem List Items Addressed This Visit        Other    Annual physical exam - Primary     - No condition which may prevent patient from driving; DMV form filled out today   - BMI Counseling: Body mass index is 28 84 kg/m²  The BMI is above normal  Nutrition recommendations include reducing portion sizes, 3-5 servings of fruits/vegetables daily, consuming healthier snacks and moderation in carbohydrate intake  Exercise recommendations include moderate aerobic physical activity for 150 minutes/week  - Will order lipid panel and hemoglobin A1C         Relevant Orders    Lipid Panel with Direct LDL reflex    HEMOGLOBIN A1C W/ EAG ESTIMATION      Other Visit Diagnoses     Overweight              Immunizations and preventive care screenings were discussed with patient today  Appropriate education was printed on patient's after visit summary  Counseling:  · Exercise: the importance of regular exercise/physical activity was discussed  Recommend exercise 3-5 times per week for at least 30 minutes  Return in about 1 year (around 2022) for or as needed   Chief Complaint:     Chief Complaint   Patient presents with    Physical Exam     Drivers      History of Present Illness:     Adult Annual Physical   Patient here for a 's license physical  The patient reports no problems  She has a past medical history of dyslipidemia and was previously on Simvastatin however is no longer taking this medication  Diet and Physical Activity  · Diet/Nutrition: well balanced diet  · Exercise: Recently joined a gym         Depression Screening  PHQ-9 Depression Screening    PHQ-9:   Frequency of the following problems over the past two weeks:      Little interest or pleasure in doing things: 0 - not at all  Feeling down, depressed, or hopeless: 0 - not at all  PHQ-2 Score: 0       General Health  · Sleep: gets 7-8 hours of sleep on average  · Hearing: No hearing issues  · Vision: wears glasses  · Dental: no dental visits for >1 year  /GYN Health   · Last menstrual period: 6/15/2021  · Contraceptive method: None  · History of STDs?: no      Review of Systems:     Review of Systems   Constitutional: Negative  HENT: Negative  Eyes: Negative  Respiratory: Negative  Cardiovascular: Negative  Gastrointestinal: Negative  Genitourinary: Negative  Musculoskeletal: Negative  Skin: Negative  Neurological: Negative  Psychiatric/Behavioral: Negative  Past Medical History:     Past Medical History:   Diagnosis Date    Hyperlipidemia     previously tx with Simvastatin, no meds since 2017      Past Surgical History:     Past Surgical History:   Procedure Laterality Date    NO PAST SURGERIES        Social History:     Social History     Socioeconomic History    Marital status: /Civil Union     Spouse name: None    Number of children: None    Years of education: None    Highest education level: None   Occupational History    None   Tobacco Use    Smoking status: Never Smoker    Smokeless tobacco: Never Used   Vaping Use    Vaping Use: Never used   Substance and Sexual Activity    Alcohol use: Yes    Drug use: Never    Sexual activity: Yes     Partners: Male   Other Topics Concern    None   Social History Narrative    None     Social Determinants of Health     Financial Resource Strain:     Difficulty of Paying Living Expenses:    Food Insecurity: No Food Insecurity    Worried About Running Out of Food in the Last Year: Never true    Tanika of Food in the Last Year: Never true   Transportation Needs: No Transportation Needs    Lack of Transportation (Medical): No    Lack of Transportation (Non-Medical):  No   Physical Activity:     Days of Exercise per Week:     Minutes of Exercise per Session:    Stress:     Feeling of Stress :    Social Connections:     Frequency of Communication with Friends and Family:     Frequency of Social Gatherings with Friends and Family:     Attends Church Services:     Active Member of Clubs or Organizations:     Attends Club or Organization Meetings:     Marital Status:    Intimate Partner Violence:     Fear of Current or Ex-Partner:     Emotionally Abused:     Physically Abused:     Sexually Abused:       Family History:     Family History   Problem Relation Age of Onset    No Known Problems Mother     No Known Problems Father     No Known Problems Daughter     Cancer Neg Hx     Diabetes Neg Hx     Breast cancer Neg Hx     Colon cancer Neg Hx     Ovarian cancer Neg Hx     Deep vein thrombosis Neg Hx     Venous thrombosis Neg Hx       Current Medications:     No current outpatient medications on file  No current facility-administered medications for this visit  Allergies:     No Known Allergies   Physical Exam:     /80 (BP Location: Right arm, Patient Position: Sitting, Cuff Size: Large)   Pulse 92   Temp 98 °F (36 7 °C) (Temporal)   Resp 16   Ht 5' 4" (1 626 m)   Wt 76 2 kg (168 lb)   SpO2 98%   BMI 28 84 kg/m²     Physical Exam  Constitutional:       General: She is not in acute distress  Appearance: Normal appearance  She is obese  HENT:      Head: Normocephalic and atraumatic  Nose: Nose normal       Mouth/Throat:      Mouth: Mucous membranes are moist    Eyes:      General:         Right eye: No discharge  Left eye: No discharge  Extraocular Movements: Extraocular movements intact  Pupils: Pupils are equal, round, and reactive to light  Cardiovascular:      Rate and Rhythm: Normal rate  Pulses: Normal pulses  Heart sounds: Normal heart sounds  No murmur heard       Pulmonary:      Effort: Pulmonary effort is normal  No respiratory distress  Breath sounds: Normal breath sounds  Abdominal:      Palpations: Abdomen is soft  Tenderness: There is no abdominal tenderness  Musculoskeletal:         General: No swelling or tenderness  Normal range of motion  Cervical back: Normal range of motion and neck supple  Skin:     General: Skin is warm  Findings: No rash  Neurological:      General: No focal deficit present  Mental Status: She is alert and oriented to person, place, and time     Psychiatric:         Mood and Affect: Mood normal          Behavior: Behavior normal           MD [place] [id number]

## 2021-06-17 NOTE — ASSESSMENT & PLAN NOTE
- No condition which may prevent patient from driving; DMV form filled out today   - BMI Counseling: Body mass index is 28 84 kg/m²  The BMI is above normal  Nutrition recommendations include reducing portion sizes, 3-5 servings of fruits/vegetables daily, consuming healthier snacks and moderation in carbohydrate intake  Exercise recommendations include moderate aerobic physical activity for 150 minutes/week     - Will order lipid panel and hemoglobin A1C

## 2021-06-17 NOTE — PATIENT INSTRUCTIONS
Heart Healthy Diet   WHAT YOU NEED TO KNOW:   A heart healthy diet is an eating plan low in unhealthy fats and sodium (salt)  The plan is high in healthy fats and fiber  A heart healthy diet helps improve your cholesterol levels and lowers your risk for heart disease and stroke  A dietitian will teach you how to read and understand food labels  DISCHARGE INSTRUCTIONS:   Heart healthy diet guidelines to follow:   · Choose foods that contain healthy fats  ? Unsaturated fats  include monounsaturated and polyunsaturated fats  Unsaturated fat is found in foods such as soybean, canola, olive, corn, and safflower oils  It is also found in soft tub margarine that is made with liquid vegetable oil  ? Omega-3 fat  is found in certain fish, such as salmon, tuna, and trout, and in walnuts and flaxseed  Eat fish high in omega-3 fats at least 2 times a week  · Get 20 to 30 grams of fiber each day  Fruits, vegetables, whole-grain foods, and legumes (cooked beans) are good sources of fiber  · Limit or do not have unhealthy fats  ? Cholesterol  is found in animal foods, such as eggs and lobster, and in dairy products made from whole milk  Limit cholesterol to less than 200 mg each day  ? Saturated fat  is found in meats, such as villalobos and hamburger  It is also found in chicken or turkey skin, whole milk, and butter  Limit saturated fat to less than 7% of your total daily calories  ? Trans fat  is found in packaged foods, such as potato chips and cookies  It is also in hard margarine, some fried foods, and shortening  Do not eat foods that contain trans fats  · Limit sodium as directed  You may be told to limit sodium to 2,000 to 2,300 mg each day  Choose low-sodium or no-salt-added foods  Add little or no salt to food you prepare  Use herbs and spices in place of salt         Include the following in your heart healthy plan:  Ask your dietitian or healthcare provider how many servings to have from each of the following food groups:  · Grains:      ? Whole-wheat breads, cereals, and pastas, and brown rice    ? Low-fat, low-sodium crackers and chips    · Vegetables:      ? Broccoli, green beans, green peas, and spinach    ? Collards, kale, and lima beans    ? Carrots, sweet potatoes, tomatoes, and peppers    ? Canned vegetables with no salt added    · Fruits:      ? Bananas, peaches, pears, and pineapple    ? Grapes, raisins, and dates    ? Oranges, tangerines, grapefruit, orange juice, and grapefruit juice    ? Apricots, mangoes, melons, and papaya    ? Raspberries and strawberries    ? Canned fruit with no added sugar    · Low-fat dairy:      ? Nonfat (skim) milk, 1% milk, and low-fat almond, cashew, or soy milks fortified with calcium    ? Low-fat cheese, regular or frozen yogurt, and cottage cheese    · Meats and proteins:      ? Lean cuts of beef and pork (loin, leg, round), skinless chicken and turkey    ? Legumes, soy products, egg whites, or nuts    Limit or do not include the following in your heart healthy plan:   · Unhealthy fats and oils:      ? Whole or 2% milk, cream cheese, sour cream, or cheese    ? High-fat cuts of beef (T-bone steaks, ribs), chicken or turkey with skin, and organ meats such as liver    ? Butter, stick margarine, shortening, and cooking oils such as coconut or palm oil    · Foods and liquids high in sodium:      ? Packaged foods, such as frozen dinners, cookies, macaroni and cheese, and cereals with more than 300 mg of sodium per serving    ? Vegetables with added sodium, such as instant potatoes, vegetables with added sauces, or regular canned vegetables    ? Cured or smoked meats, such as hot dogs, villalobos, and sausage    ? High-sodium ketchup, barbecue sauce, salad dressing, pickles, olives, soy sauce, or miso    · Foods and liquids high in sugar:      ? Candy, cake, cookies, pies, or doughnuts    ? Soft drinks (soda), sports drinks, or sweetened tea    ?  Canned or dry mixes for cakes, soups, sauces, or gravies    Other healthy heart guidelines:   · Do not smoke  Nicotine and other chemicals in cigarettes and cigars can cause lung and heart damage  Ask your healthcare provider for information if you currently smoke and need help to quit  E-cigarettes or smokeless tobacco still contain nicotine  Talk to your healthcare provider before you use these products  · Limit or do not drink alcohol as directed  Alcohol can damage your heart and raise your blood pressure  Your healthcare provider may give you specific daily and weekly limits  The general recommended limit is 1 drink a day for women 21 or older and for men 72 or older  Do not have more than 3 drinks in a day or 7 in a week  The recommended limit is 2 drinks a day for men 24to 59years of age  Do not have more than 4 drinks in a day or 14 in a week  A drink of alcohol is 12 ounces of beer, 5 ounces of wine, or 1½ ounces of liquor  · Exercise regularly  Exercise can help you maintain a healthy weight and improve your blood pressure and cholesterol levels  Regular exercise can also decrease your risk for heart problems  Ask your healthcare provider about the best exercise plan for you  Do not start an exercise program without asking your healthcare provider  Follow up with your doctor or cardiologist as directed:  Write down your questions so you remember to ask them during your visits  © Copyright [address] Information is for End User's use only and may not be sold, redistributed or otherwise used for commercial purposes  All illustrations and images included in CareNotes® are the copyrighted property of A D A M , Inc  or [address] [place] [address]  The above information is an  only  It is not intended as medical advice for individual conditions or treatments  Talk to your doctor, nurse or pharmacist before following any medical regimen to see if it is safe and effective for you

## 2021-07-22 ENCOUNTER — TELEPHONE (OUTPATIENT)
Dept: OBGYN CLINIC | Facility: CLINIC | Age: 31
End: 2021-07-22

## 2021-07-22 DIAGNOSIS — Z30.09 UNWANTED FERTILITY: ICD-10-CM

## 2021-07-22 RX ORDER — NORETHINDRONE ACETATE AND ETHINYL ESTRADIOL 1MG-20(21)
1 KIT ORAL DAILY
Qty: 28 TABLET | Refills: 10 | Status: SHIPPED | OUTPATIENT
Start: 2021-07-22 | End: 2021-11-02 | Stop reason: SDUPTHER

## 2021-07-22 NOTE — TELEPHONE ENCOUNTER
Patient called in and advises me that she does not want IUD anymore  She is happy taking OCP's and desires to continue with the OCP's  Will send Rx refills to her pharmacy    Return in 3/2022 for annual GYN exam

## 2021-10-28 ENCOUNTER — TELEPHONE (OUTPATIENT)
Dept: OBGYN CLINIC | Facility: CLINIC | Age: 31
End: 2021-10-28

## 2021-11-02 ENCOUNTER — OFFICE VISIT (OUTPATIENT)
Dept: OBGYN CLINIC | Facility: CLINIC | Age: 31
End: 2021-11-02

## 2021-11-02 VITALS
HEIGHT: 64 IN | SYSTOLIC BLOOD PRESSURE: 104 MMHG | BODY MASS INDEX: 28.34 KG/M2 | DIASTOLIC BLOOD PRESSURE: 70 MMHG | WEIGHT: 166 LBS

## 2021-11-02 DIAGNOSIS — Z30.09 UNWANTED FERTILITY: ICD-10-CM

## 2021-11-02 DIAGNOSIS — Z30.41 ENCOUNTER FOR SURVEILLANCE OF CONTRACEPTIVE PILLS: Primary | ICD-10-CM

## 2021-11-02 PROCEDURE — 3008F BODY MASS INDEX DOCD: CPT | Performed by: NURSE PRACTITIONER

## 2021-11-02 PROCEDURE — 99213 OFFICE O/P EST LOW 20 MIN: CPT | Performed by: NURSE PRACTITIONER

## 2021-11-02 RX ORDER — NORETHINDRONE ACETATE AND ETHINYL ESTRADIOL 1MG-20(21)
1 KIT ORAL DAILY
Qty: 28 TABLET | Refills: 11 | Status: SHIPPED | OUTPATIENT
Start: 2021-11-02 | End: 2022-05-19 | Stop reason: SDUPTHER

## 2021-12-28 ENCOUNTER — OFFICE VISIT (OUTPATIENT)
Dept: FAMILY MEDICINE CLINIC | Facility: CLINIC | Age: 31
End: 2021-12-28

## 2021-12-28 DIAGNOSIS — Z20.822 EXPOSURE TO COVID-19 VIRUS: ICD-10-CM

## 2021-12-28 DIAGNOSIS — B34.9 VIRAL INFECTION, UNSPECIFIED: ICD-10-CM

## 2021-12-28 PROCEDURE — 87636 SARSCOV2 & INF A&B AMP PRB: CPT | Performed by: PHYSICIAN ASSISTANT

## 2021-12-28 PROCEDURE — 99213 OFFICE O/P EST LOW 20 MIN: CPT | Performed by: PHYSICIAN ASSISTANT

## 2022-01-06 ENCOUNTER — TELEMEDICINE (OUTPATIENT)
Dept: FAMILY MEDICINE CLINIC | Facility: CLINIC | Age: 32
End: 2022-01-06

## 2022-01-06 DIAGNOSIS — U07.1 COVID-19: Primary | ICD-10-CM

## 2022-01-06 PROCEDURE — 99213 OFFICE O/P EST LOW 20 MIN: CPT

## 2022-01-06 PROCEDURE — 1036F TOBACCO NON-USER: CPT

## 2022-01-06 NOTE — PROGRESS NOTES
COVID-19 Outpatient Progress Note    Assessment/Plan:    Problem List Items Addressed This Visit     None         Disposition:     Patient has COVID-19 infection  Based off CDC guidelines, they were recommended to isolate for 5 days from the date of the positive test  If they remain asymptomatic, isolation may be ended followed by 5 days of wearing a mask when around othes to minimize risk of infecting others  If they have a fever, continue to stay home until fever resolves for at least 24 hours  I have spent 5 minutes directly with the patient  Greater than 50% of this time was spent in counseling/coordination of care regarding: instructions for management  Pt may discontinue quarantine at this time  Advised pt to make an appt for reported back pain  Encounter provider CESAR Sanchez    Provider located at [address] [address] [phone]    Recent Visits  No visits were found meeting these conditions  Showing recent visits within past 7 days and meeting all other requirements  Today's Visits  Date Type Provider Dept   01/06/22 Telemedicine Melodie Sanchez 48 today's visits and meeting all other requirements  Future Appointments  No visits were found meeting these conditions  Showing future appointments within next 150 days and meeting all other requirements     This virtual check-in was done via telephone and she agrees to proceed  Patient agrees to participate in a virtual check in via telephone or video visit instead of presenting to the office to address urgent/immediate medical needs  Patient is aware this is a billable service  After connecting through Telephone, the patient was identified by name and date of birth  Polkjillian Solimanmervat was informed that this was a telemedicine visit and that the exam was being conducted confidentially over secure lines   My office door was closed  No one else was in the room  Jalil Alvarado acknowledged consent and understanding of privacy and security of the telemedicine visit  I informed the patient that I have reviewed her record in Epic and presented the opportunity for her to ask any questions regarding the visit today  The patient agreed to participate  It was my intent to perform this visit via video technology but the patient was not able to do a video connection so the visit was completed via audio telephone only  Verification of patient location:  Patient is located in the following state in which I hold an active license: [place]    Subjective:   Jalil Alvarado is a 32 y o  female who has been screened for COVID-19  Symptom change since last report: improving  Patient's symptoms include myalgias  Patient denies fever, chills, fatigue, malaise, congestion, rhinorrhea, sore throat, anosmia, loss of taste, cough, shortness of breath, chest tightness, abdominal pain, nausea, vomiting, diarrhea and headaches  Date of symptom onset: 12/26/2021  Date of exposure: 12/21/2021  Date of positive COVID-19 PCR: 12/28/2021  COVID-19 vaccination status: Fully vaccinated (primary series)    January Cornejo has been staying home and has isolated themselves in her home  She is taking care to not share personal items and is cleaning all surfaces that are touched often, like counters, tabletops, and doorknobs using household cleaning sprays or wipes  She is wearing a mask when she leaves her room       Lab Results   Component Value Date    SARSCOV2 Positive (A) 12/28/2021    SARSCOV2 Detected (A) 12/08/2020     Past Medical History:   Diagnosis Date    Hyperlipidemia     previously tx with Simvastatin, no meds since 2017     Past Surgical History:   Procedure Laterality Date    NO PAST SURGERIES       Current Outpatient Medications   Medication Sig Dispense Refill    norethindrone-ethinyl estradiol (JUNEL FE 1/20) 1-20 MG-MCG per tablet Take 1 tablet by mouth daily 28 tablet 11     No current facility-administered medications for this visit  No Known Allergies    Review of Systems   Constitutional: Negative for chills, fatigue and fever  HENT: Negative for congestion, ear pain, rhinorrhea and sore throat  Eyes: Negative for pain and visual disturbance  Respiratory: Negative for cough, chest tightness and shortness of breath  Cardiovascular: Negative for chest pain and palpitations  Gastrointestinal: Negative for abdominal pain, diarrhea, nausea and vomiting  Genitourinary: Negative for dysuria and hematuria  Musculoskeletal: Positive for back pain and myalgias  Negative for arthralgias  Skin: Negative for color change and rash  Neurological: Negative for seizures, syncope and headaches  All other systems reviewed and are negative  Objective: There were no vitals filed for this visit  Physical Exam  Neurological:      Mental Status: She is alert  Psychiatric:         Mood and Affect: Mood normal          Behavior: Behavior normal          Thought Content: Thought content normal          Judgment: Judgment normal          VIRTUAL VISIT [id number] [facility] verbally agrees to participate in [employer]  Pt is aware that [employer] could be limited without vital signs or the ability to perform a full hands-on physical Kim Stewart understands she or the provider may request at any time to terminate the video visit and request the patient to seek care or treatment in person

## 2022-02-09 ENCOUNTER — TELEPHONE (OUTPATIENT)
Dept: OBGYN CLINIC | Facility: CLINIC | Age: 32
End: 2022-02-09

## 2022-02-09 NOTE — TELEPHONE ENCOUNTER
Spoke to [employer], pt states she had Covid at the end of the year and may have missed pills and now periods seem to be irregular  Explained that between being sick and missing pills periods can be irregular  Explained to remember daily pills and give it a month or two to re-regulate  To call with firther needs or concerns  Pt verbalized understanding of all discussed

## 2022-05-19 ENCOUNTER — ANNUAL EXAM (OUTPATIENT)
Dept: OBGYN CLINIC | Facility: CLINIC | Age: 32
End: 2022-05-19

## 2022-05-19 VITALS
HEIGHT: 62 IN | WEIGHT: 165.8 LBS | BODY MASS INDEX: 30.51 KG/M2 | HEART RATE: 83 BPM | SYSTOLIC BLOOD PRESSURE: 103 MMHG | DIASTOLIC BLOOD PRESSURE: 68 MMHG

## 2022-05-19 DIAGNOSIS — Z12.39 ENCOUNTER FOR BREAST CANCER SCREENING USING NON-MAMMOGRAM MODALITY: ICD-10-CM

## 2022-05-19 DIAGNOSIS — Z30.09 UNWANTED FERTILITY: ICD-10-CM

## 2022-05-19 DIAGNOSIS — Z12.4 SCREENING FOR CERVICAL CANCER: ICD-10-CM

## 2022-05-19 DIAGNOSIS — Z01.419 ENCOUNTER FOR GYNECOLOGICAL EXAMINATION WITHOUT ABNORMAL FINDING: Primary | ICD-10-CM

## 2022-05-19 PROBLEM — U07.1 COVID-19: Status: RESOLVED | Noted: 2020-12-18 | Resolved: 2022-05-19

## 2022-05-19 PROCEDURE — 3008F BODY MASS INDEX DOCD: CPT | Performed by: NURSE PRACTITIONER

## 2022-05-19 PROCEDURE — 99395 PREV VISIT EST AGE 18-39: CPT | Performed by: NURSE PRACTITIONER

## 2022-05-19 PROCEDURE — 3725F SCREEN DEPRESSION PERFORMED: CPT | Performed by: NURSE PRACTITIONER

## 2022-05-19 PROCEDURE — 1036F TOBACCO NON-USER: CPT | Performed by: NURSE PRACTITIONER

## 2022-05-19 PROCEDURE — 0503F POSTPARTUM CARE VISIT: CPT | Performed by: NURSE PRACTITIONER

## 2022-05-19 RX ORDER — NORETHINDRONE ACETATE AND ETHINYL ESTRADIOL 1MG-20(21)
1 KIT ORAL DAILY
Qty: 28 TABLET | Refills: 12 | Status: SHIPPED | OUTPATIENT
Start: 2022-05-19

## 2022-05-19 NOTE — PATIENT INSTRUCTIONS
Rene por patel confianza en nuestro equipo  Inetta Semen y agradecemos kana comentarios  Si recibe rajiv encuesta nuestra, tómese unos momentos para informarnos cómo estamos  Sinceramente,  Ke Backer, CRNP       OBESIDAD     Obesidad es cuando patel índice de masa corporal [facility]) es superior a 30  Patel Body mass index is 30 33 kg/m²  Audrea Ramming Los riesgos de la obesidad incluyen  muchos problemas de [employer], incluidas las lesiones y la discapacidad física  Es posible que deba realizarse exámenes para detectar lo siguiente:  Diabetes     Hipertensión o colesterol altoEnfermedades del corazón     Enfermedad cardíaca     Enfermedades del hígado o de la vesícula biliar     Cáncer de colon, de pecho, de próstata, de hígado o de riñón     El apnea del sueño     Artritis o gota    Busque atención médica de inmediato si:   Usted tiene un intenso dolor de naz, confusión o dificultad para hablar  Usted tiene debilidad en un lado del cuerpo  Usted tiene [place] [employer], sudoración o falta de aire  Pregúntele a patel Dorisann Holliday vitaminas y minerales son adecuados para usted  Usted tiene síntomas de enfermedad de la vesícula biliar o el hígado, yamel dolor en la parte superior del abdomen  Usted tiene dolor e incomodidad de rodillas o caderas al caminar  Usted presenta síntomas de diabetes, yamel exceso de apetito y sed y micción frecuente  Usted presenta síntomas de apnea de sueño, yamel roncar o tener sueño jeannine el día  Usted tiene preguntas o inquietudes acerca de patel condición o cuidado  El tratamiento para la obesidad  se enfoca en ayudarle a bajar de peso para mejorar patel danish  Incluso rajiv reducción mínima del índice de [employer] corporal puede reducir el riesgo de muchos problemas de [employer]  Patel médico lo ayudará a fijarse rajiv meta para bajar de [place]  Cambios en el estilo de angelo  son los primeros pasos para tratar la obesidad   Estos cambios incluyen reena decisiones para consumir alimentos saludables y realizar Trinity Pratik física con regularidad  El médico puede recomendarle un programa para bajar de peso que consta de capacitación, educación y Desiree  Medicamento  le pueden ayudar a bajar de peso cuando los [place] en conjunto con Jay Beyera Ly y [place]  Cirugía  le puede ayudar a bajar de peso si usted es muy sophie y presenta otros problemas de danish  Existen varios tipos de [place] para adelgazar  Pídale a patel médico más información  Cómo perder peso de forma exitosa:   Propóngase metas accesibles y realistas  Un ejemplo de rajiv meta accesible es caminar 20 minutos los 5 días de la [place]  Otro objetivo puede ser perder 5% de patel peso corporal     Coméntele a kana amigos, familiares y compañeros de trabajo sobre kana metas  y solicite que lo apoyen  Convide a un amigo para hacer ejercicio juntos o acuda a un bruce de motivación para bajar de [place]  Identifique los alimentos o situaciones que le pueden provocar que coma en exceso y busque formas para evitarlos  Deshágase de alimentos altos en calorías en patel hogar o en el trabajo  En la cocina tenga rajiv canasta con frutas frescas  Si el estrés es la causa para que usted coma más encuentre formas para sobrellevar el estrés  Lleve un diario en el que registre lo que usted come y ced  También escriba la cantidad de tiempo que pasa realizando rajiv actividad física jeannine el día  Pésese rajiv vez a la semana y anótelo en patel diario  Cambios en la alimentación:  Usted necesitará consumir menos de 500 a 1 000 calorías al día de las que usted actualmente consume para perder entre 1 a 2 libras a la semana  Los siguientes cambios le ayudarán a disminuir la cantidad de las calorías que normalmente consume:  Reduzca el tamaño de las porciones  Utilice platos pequeños que no midan más de 9 pulgadas de diámetro  Llene la mitad del plato con frutas y verduras   Utilice las tazas medidoras para racionar los alimentos hasta que usted sepa yamel se ve el tamaño de rajiv porción  Consuma 3 comidas y 1 o 2 meriendas al día  Planee kana comidas con anterioridad  Cocine y coma en la casa todo el tiempo que le sea posible  Coma lentamente  Consuma frutas y verduras con todas las comidas  Judah Loose y verduras son bajas en calorías y altas en fibra lo cual lo llena  No adicione mantequilla, ni margarina, ni salsas a base de crema a las verduras  Utilice las hierbas para sazonar las verduras al vapor  Consuma menos grasas y alimentos fritos  Consuma albert o pescado al horno o la breanna  Estas proteínas son más bajas en calorías y grasas que la carne [place]  Limite las comidas rápidas  Es mejor que utilice aderezos para la ensalada a base de aceite de [place] y vinagre en vez de aderezos en botella  Limite la cantidad de azúcar que consume  No consuma bebidas azucaradas  Limite el consumo de alcohol  Cambios de actividad:  La actividad física es buena para patel cuerpo por muchas razones  Le ayuda a quemar calorías y fortalecer kana músculos  Michel Brought y la depresión y mejora patel estado de ánimo  Además puede ayudarle a dormir mejor  Consulte con patel médico antes de empezar un régimen de ejercicios  Realice rajiv actividad física por lo menos por 30 minutos 5 días a la semana  Empiece despacio  Aparte tiempo cada día para rajiv actividad física que usted crystal y [employer] sea [facility]  Es mejor realizar tanto un programa de pesas yamel [place] para aumentar patel ritmo cardíaco, yamel caminar, montar en bicicleta o nadar  Busque formas para ser [employer]  Realice rajiv actividad de jardinería y limpiar la casa  [address] escaleras en vez de utilizar el elevador  En patel tiempo saida concurra a eventos que le exijan caminar, yamel festivales y ferias al [employer]  Adicionar esta actividad física puede ayudarle a bajar y [address]  Acuda a kana consultas de control con patel médico según le indicaron    Puede que necesite consultar con un dietista  Anote kana preguntas para que se acuerde de hacerlas jeannine kana visitas

## 2022-05-19 NOTE — PROGRESS NOTES
[address] Grace Prescott is a 28 y o  female who presents today for annual GYN exam   Her last pap smear was performed 2020 and result was NILM  She reports no history of abnormal pap smears in her past   She reports menses as regular  Patient's last menstrual period was 2022 (approximate)  Her general medical history has been reviewed and she reports it as follows:    Past Medical History:   Diagnosis Date    Hyperlipidemia     previously tx with Simvastatin, no meds since 2017     Past Surgical History:   Procedure Laterality Date    NO PAST SURGERIES       OB History        2    Para   2    Term   2       0    AB   0    Living   2       SAB   0    IAB   0    Ectopic   0    Multiple   0    Live Births   2               Social History     Tobacco Use    Smoking status: Never Smoker    Smokeless tobacco: Never Used   Vaping Use    Vaping Use: Never used   Substance Use Topics    Alcohol use: Yes    Drug use: Never     Social History     Substance and Sexual Activity   Sexual Activity Yes    Partners: Male    Birth control/protection: OCP     Cancer-related family history is negative for Cancer, Breast cancer, Colon cancer, and Ovarian cancer  Current Outpatient Medications   Medication Instructions    norethindrone-ethinyl estradiol (JUNEL FE 1/20) 1-20 MG-MCG per tablet 1 tablet, Oral, Daily       Review of Systems:  Review of Systems   Constitutional: Negative  Gastrointestinal: Negative  Genitourinary: Negative for difficulty urinating, menstrual problem, pelvic pain and vaginal discharge  Skin: Negative  Physical Exam:  /68   Pulse 83   Ht 5' 2" (1 575 m)   Wt 75 2 kg (165 lb 12 8 oz)   LMP 2022 (Approximate)   BMI 30 33 kg/m²   Physical Exam  Constitutional:       General: She is not in acute distress  Appearance: She is well-developed     Genitourinary:      Vulva normal       No lesions in the vagina  Right Adnexa: not tender and no mass present  Left Adnexa: not tender and no mass present  No cervical motion tenderness or lesion  Uterus is not tender  Breasts:      Right: No mass, nipple discharge, skin change or tenderness  Left: No mass, nipple discharge, skin change or tenderness  Neck:      Thyroid: No thyromegaly  Cardiovascular:      Rate and Rhythm: Normal rate and regular rhythm  Pulmonary:      Effort: Pulmonary effort is normal    Abdominal:      Palpations: Abdomen is soft  Tenderness: There is no abdominal tenderness  Musculoskeletal:      Cervical back: Neck supple  Neurological:      Mental Status: She is alert and oriented to person, place, and time  Skin:     General: Skin is warm and dry  Vitals reviewed  Assessment/Plan:   1  Normal well-woman GYN exam   2  Cervical cancer screening:  Normal cervical exam   Pap smear not indicated at this time  3  STD screening:  Patient declines  4  Breast cancer screening:  Normal breast exam   Reviewed breast self-awareness  5  Depression Screening: Patient's depression screening was assessed with a PHQ-2 score of 2  Their PHQ-9 score was 3  Clinically patient does not have depression  No treatment is required  6  BMI Counseling: Body mass index is 30 33 kg/m²  Discussed the patient's BMI with her  The BMI is above normal  Patient referred to PCP due to patient being obese  7  Contraception:  OCP's  Given Rx refills for another year     8  Return to office in 1 year for annual GYN exam

## 2022-09-10 ENCOUNTER — APPOINTMENT (EMERGENCY)
Dept: CT IMAGING | Facility: HOSPITAL | Age: 32
End: 2022-09-10
Payer: COMMERCIAL

## 2022-09-10 ENCOUNTER — HOSPITAL ENCOUNTER (OUTPATIENT)
Facility: HOSPITAL | Age: 32
Setting detail: OBSERVATION
Discharge: HOME | End: 2022-09-11
Attending: EMERGENCY MEDICINE | Admitting: FAMILY MEDICINE
Payer: COMMERCIAL

## 2022-09-10 DIAGNOSIS — N13.2 HYDRONEPHROSIS WITH URINARY OBSTRUCTION DUE TO URETERAL CALCULUS: ICD-10-CM

## 2022-09-10 DIAGNOSIS — N20.1 CALCULUS OF URETER: ICD-10-CM

## 2022-09-10 DIAGNOSIS — R10.9 FLANK PAIN: ICD-10-CM

## 2022-09-10 DIAGNOSIS — N39.0 UTI (URINARY TRACT INFECTION): Primary | ICD-10-CM

## 2022-09-10 PROBLEM — N34.2 URETHRITIS: Status: ACTIVE | Noted: 2022-09-10

## 2022-09-10 PROBLEM — N30.90 CYSTITIS: Status: ACTIVE | Noted: 2022-09-10

## 2022-09-10 LAB
ALBUMIN SERPL BCP-MCNC: 4.4 G/DL (ref 3.5–5)
ALP SERPL-CCNC: 69 U/L (ref 43–122)
ALT SERPL W P-5'-P-CCNC: 25 U/L
ANION GAP SERPL CALCULATED.3IONS-SCNC: 6 MMOL/L (ref 5–14)
AST SERPL W P-5'-P-CCNC: 38 U/L (ref 14–36)
BACTERIA UR QL AUTO: ABNORMAL /HPF
BASOPHILS # BLD AUTO: 0.03 THOUSANDS/ΜL (ref 0–0.1)
BASOPHILS NFR BLD AUTO: 0 % (ref 0–1)
BILIRUB SERPL-MCNC: 0.9 MG/DL (ref 0.2–1)
BILIRUB UR QL STRIP: NEGATIVE
BUN SERPL-MCNC: 14 MG/DL (ref 5–25)
CALCIUM SERPL-MCNC: 8.7 MG/DL (ref 8.4–10.2)
CHLORIDE SERPL-SCNC: 101 MMOL/L (ref 96–108)
CLARITY UR: CLEAR
CO2 SERPL-SCNC: 25 MMOL/L (ref 21–32)
COLOR UR: YELLOW
CREAT SERPL-MCNC: 0.88 MG/DL (ref 0.6–1.2)
EOSINOPHIL # BLD AUTO: 0.02 THOUSAND/ΜL (ref 0–0.61)
EOSINOPHIL NFR BLD AUTO: 0 % (ref 0–6)
ERYTHROCYTE [DISTWIDTH] IN BLOOD BY AUTOMATED COUNT: 12.3 % (ref 11.6–15.1)
EXT PREG TEST URINE: NEGATIVE
EXT. CONTROL ED NAV: NORMAL
GFR SERPL CREATININE-BSD FRML MDRD: 87 ML/MIN/1.73SQ M
GLUCOSE SERPL-MCNC: 104 MG/DL (ref 70–99)
GLUCOSE UR STRIP-MCNC: NEGATIVE MG/DL
HCT VFR BLD AUTO: 39.4 % (ref 34.8–46.1)
HGB BLD-MCNC: 13.1 G/DL (ref 11.5–15.4)
HGB UR QL STRIP.AUTO: 25
IMM GRANULOCYTES # BLD AUTO: 0.03 THOUSAND/UL (ref 0–0.2)
IMM GRANULOCYTES NFR BLD AUTO: 0 % (ref 0–2)
KETONES UR STRIP-MCNC: ABNORMAL MG/DL
LEUKOCYTE ESTERASE UR QL STRIP: 100
LYMPHOCYTES # BLD AUTO: 1.6 THOUSANDS/ΜL (ref 0.6–4.47)
LYMPHOCYTES NFR BLD AUTO: 12 % (ref 14–44)
MCH RBC QN AUTO: 28.6 PG (ref 26.8–34.3)
MCHC RBC AUTO-ENTMCNC: 33.2 G/DL (ref 31.4–37.4)
MCV RBC AUTO: 86 FL (ref 82–98)
MONOCYTES # BLD AUTO: 0.47 THOUSAND/ΜL (ref 0.17–1.22)
MONOCYTES NFR BLD AUTO: 4 % (ref 4–12)
MUCOUS THREADS UR QL AUTO: ABNORMAL
NEUTROPHILS # BLD AUTO: 10.9 THOUSANDS/ΜL (ref 1.85–7.62)
NEUTS SEG NFR BLD AUTO: 84 % (ref 43–75)
NITRITE UR QL STRIP: NEGATIVE
NON-SQ EPI CELLS URNS QL MICRO: ABNORMAL /HPF
NRBC BLD AUTO-RTO: 0 /100 WBCS
PH UR STRIP.AUTO: 5 [PH]
PLATELET # BLD AUTO: 259 THOUSANDS/UL (ref 149–390)
PMV BLD AUTO: 10 FL (ref 8.9–12.7)
POTASSIUM SERPL-SCNC: 4.9 MMOL/L (ref 3.5–5.3)
PROT SERPL-MCNC: 8.4 G/DL (ref 6.4–8.4)
PROT UR STRIP-MCNC: ABNORMAL MG/DL
RBC # BLD AUTO: 4.58 MILLION/UL (ref 3.81–5.12)
RBC #/AREA URNS AUTO: ABNORMAL /HPF
SODIUM SERPL-SCNC: 132 MMOL/L (ref 135–147)
SP GR UR STRIP.AUTO: 1.02 (ref 1–1.04)
UROBILINOGEN UA: NEGATIVE MG/DL
WBC # BLD AUTO: 13.05 THOUSAND/UL (ref 4.31–10.16)
WBC #/AREA URNS AUTO: ABNORMAL /HPF

## 2022-09-10 PROCEDURE — 96375 TX/PRO/DX INJ NEW DRUG ADDON: CPT

## 2022-09-10 PROCEDURE — 85025 COMPLETE CBC W/AUTO DIFF WBC: CPT

## 2022-09-10 PROCEDURE — 87086 URINE CULTURE/COLONY COUNT: CPT | Performed by: STUDENT IN AN ORGANIZED HEALTH CARE EDUCATION/TRAINING PROGRAM

## 2022-09-10 PROCEDURE — 96365 THER/PROPH/DIAG IV INF INIT: CPT

## 2022-09-10 PROCEDURE — 80053 COMPREHEN METABOLIC PANEL: CPT

## 2022-09-10 PROCEDURE — 81003 URINALYSIS AUTO W/O SCOPE: CPT

## 2022-09-10 PROCEDURE — 36415 COLL VENOUS BLD VENIPUNCTURE: CPT

## 2022-09-10 PROCEDURE — 99285 EMERGENCY DEPT VISIT HI MDM: CPT

## 2022-09-10 PROCEDURE — 81025 URINE PREGNANCY TEST: CPT

## 2022-09-10 PROCEDURE — 81001 URINALYSIS AUTO W/SCOPE: CPT

## 2022-09-10 PROCEDURE — 74176 CT ABD & PELVIS W/O CONTRAST: CPT

## 2022-09-10 PROCEDURE — G1004 CDSM NDSC: HCPCS

## 2022-09-10 PROCEDURE — NC001 PR NO CHARGE: Performed by: FAMILY MEDICINE

## 2022-09-10 RX ORDER — LANOLIN ALCOHOL/MO/W.PET/CERES
3 CREAM (GRAM) TOPICAL
Status: DISCONTINUED | OUTPATIENT
Start: 2022-09-10 | End: 2022-09-11 | Stop reason: HOSPADM

## 2022-09-10 RX ORDER — KETOROLAC TROMETHAMINE 30 MG/ML
15 INJECTION, SOLUTION INTRAMUSCULAR; INTRAVENOUS EVERY 6 HOURS PRN
Status: DISCONTINUED | OUTPATIENT
Start: 2022-09-10 | End: 2022-09-11 | Stop reason: HOSPADM

## 2022-09-10 RX ORDER — ACETAMINOPHEN 325 MG/1
650 TABLET ORAL EVERY 6 HOURS PRN
Status: DISCONTINUED | OUTPATIENT
Start: 2022-09-10 | End: 2022-09-11 | Stop reason: HOSPADM

## 2022-09-10 RX ORDER — TAMSULOSIN HYDROCHLORIDE 0.4 MG/1
0.4 CAPSULE ORAL
Status: DISCONTINUED | OUTPATIENT
Start: 2022-09-11 | End: 2022-09-11 | Stop reason: HOSPADM

## 2022-09-10 RX ORDER — KETOROLAC TROMETHAMINE 30 MG/ML
30 INJECTION, SOLUTION INTRAMUSCULAR; INTRAVENOUS ONCE
Status: COMPLETED | OUTPATIENT
Start: 2022-09-10 | End: 2022-09-10

## 2022-09-10 RX ORDER — CEFTRIAXONE 1 G/50ML
1000 INJECTION, SOLUTION INTRAVENOUS EVERY 24 HOURS
Status: DISCONTINUED | OUTPATIENT
Start: 2022-09-11 | End: 2022-09-11 | Stop reason: HOSPADM

## 2022-09-10 RX ORDER — NORETHINDRONE ACETATE AND ETHINYL ESTRADIOL 1MG-20(21)
1 KIT ORAL DAILY
Status: DISCONTINUED | OUTPATIENT
Start: 2022-09-11 | End: 2022-09-11 | Stop reason: HOSPADM

## 2022-09-10 RX ORDER — TAMSULOSIN HYDROCHLORIDE 0.4 MG/1
0.4 CAPSULE ORAL ONCE
Status: COMPLETED | OUTPATIENT
Start: 2022-09-10 | End: 2022-09-10

## 2022-09-10 RX ORDER — CEFTRIAXONE 1 G/50ML
1000 INJECTION, SOLUTION INTRAVENOUS ONCE
Status: COMPLETED | OUTPATIENT
Start: 2022-09-10 | End: 2022-09-10

## 2022-09-10 RX ORDER — MORPHINE SULFATE 10 MG/ML
6 INJECTION, SOLUTION INTRAMUSCULAR; INTRAVENOUS EVERY 4 HOURS PRN
Status: DISCONTINUED | OUTPATIENT
Start: 2022-09-10 | End: 2022-09-10

## 2022-09-10 RX ORDER — SODIUM CHLORIDE 9 MG/ML
100 INJECTION, SOLUTION INTRAVENOUS CONTINUOUS
Status: DISCONTINUED | OUTPATIENT
Start: 2022-09-10 | End: 2022-09-11 | Stop reason: HOSPADM

## 2022-09-10 RX ORDER — ONDANSETRON 2 MG/ML
4 INJECTION INTRAMUSCULAR; INTRAVENOUS EVERY 6 HOURS PRN
Status: DISCONTINUED | OUTPATIENT
Start: 2022-09-10 | End: 2022-09-11 | Stop reason: HOSPADM

## 2022-09-10 RX ORDER — MORPHINE SULFATE 4 MG/ML
4 INJECTION, SOLUTION INTRAMUSCULAR; INTRAVENOUS
Status: DISCONTINUED | OUTPATIENT
Start: 2022-09-10 | End: 2022-09-10

## 2022-09-10 RX ADMIN — CEFTRIAXONE 1000 MG: 1 INJECTION, SOLUTION INTRAVENOUS at 19:53

## 2022-09-10 RX ADMIN — KETOROLAC TROMETHAMINE 30 MG: 30 INJECTION, SOLUTION INTRAMUSCULAR; INTRAVENOUS at 18:38

## 2022-09-10 RX ADMIN — SODIUM CHLORIDE, SODIUM LACTATE, POTASSIUM CHLORIDE, AND CALCIUM CHLORIDE 1000 ML: .6; .31; .03; .02 INJECTION, SOLUTION INTRAVENOUS at 18:40

## 2022-09-10 RX ADMIN — MORPHINE SULFATE 4 MG: 4 INJECTION, SOLUTION INTRAMUSCULAR; INTRAVENOUS at 19:42

## 2022-09-10 RX ADMIN — SODIUM CHLORIDE 100 ML/HR: 0.9 INJECTION, SOLUTION INTRAVENOUS at 21:54

## 2022-09-10 RX ADMIN — MELATONIN TAB 3 MG 3 MG: 3 TAB at 21:54

## 2022-09-10 RX ADMIN — TAMSULOSIN HYDROCHLORIDE 0.4 MG: 0.4 CAPSULE ORAL at 19:59

## 2022-09-10 RX ADMIN — ONDANSETRON 4 MG: 2 INJECTION INTRAMUSCULAR; INTRAVENOUS at 23:57

## 2022-09-10 NOTE — DISCHARGE INSTRUCTIONS
A 3 mm distal left ureteral calculus just proximal to the left UVJ causes mild upstream hydroureteronephrosis  2   Findings suggesting cystitis  Correlate with urinalysis

## 2022-09-10 NOTE — ED PROVIDER NOTES
History  Chief Complaint   Patient presents with    Flank Pain     Pt reports left side flank pain, vaginal pain and left side abdominal pain     Patient is a 72-year-old female coming in for complaint of left flank pain that started last night, started with pain in the vagina that radiate up to the left side of the abdomen, and is now going to the left flank  Patient is also having some dysuria, and hematuria  Patient does not have a history of kidney stones      History provided by:  Patient   used: No    Flank Pain  Pain location:  L flank  Pain radiates to:  Groin  Pain severity:  Mild  Duration:  2 days  Associated symptoms: dysuria, hematuria and nausea    Associated symptoms: no chills, no diarrhea, no fever, no shortness of breath, no vaginal bleeding and no vomiting        Prior to Admission Medications   Prescriptions Last Dose Informant Patient Reported? Taking?   norethindrone-ethinyl estradiol (JUNEL FE 1/20) 1-20 MG-MCG per tablet   No No   Sig: Take 1 tablet by mouth in the morning  Facility-Administered Medications: None       Past Medical History:   Diagnosis Date    Hyperlipidemia     previously tx with Simvastatin, no meds since 2017       Past Surgical History:   Procedure Laterality Date    NO PAST SURGERIES         Family History   Problem Relation Age of Onset    No Known Problems Mother     No Known Problems Father     No Known Problems Daughter     Cancer Neg Hx     Diabetes Neg Hx     Breast cancer Neg Hx     Colon cancer Neg Hx     Ovarian cancer Neg Hx     Deep vein thrombosis Neg Hx     Venous thrombosis Neg Hx      I have reviewed and agree with the history as documented  E-Cigarette/Vaping    E-Cigarette Use Never User      E-Cigarette/Vaping Substances     Social History     Tobacco Use    Smoking status: Never Smoker    Smokeless tobacco: Never Used   Vaping Use    Vaping Use: Never used   Substance Use Topics    Alcohol use:  Yes    Drug use: Never       Review of Systems   Constitutional: Negative  Negative for chills and fever  HENT: Negative  Eyes: Negative  Respiratory: Negative  Negative for shortness of breath  Cardiovascular: Negative  Gastrointestinal: Positive for nausea  Negative for diarrhea and vomiting  Genitourinary: Positive for dysuria, flank pain and hematuria  Negative for vaginal bleeding  Skin: Negative  Neurological: Negative  Psychiatric/Behavioral: Negative  Physical Exam  Physical Exam  Vitals reviewed  Constitutional:       Appearance: Normal appearance  She is normal weight  HENT:      Head: Normocephalic and atraumatic  Right Ear: External ear normal       Left Ear: External ear normal       Nose: Nose normal    Eyes:      Conjunctiva/sclera: Conjunctivae normal    Cardiovascular:      Rate and Rhythm: Tachycardia present  Pulmonary:      Effort: Pulmonary effort is normal    Abdominal:      Palpations: Abdomen is soft  Tenderness: There is no abdominal tenderness  There is no right CVA tenderness, left CVA tenderness or guarding  Musculoskeletal:         General: Normal range of motion  Cervical back: Normal range of motion  Skin:     General: Skin is warm and dry  Neurological:      Mental Status: She is alert           Vital Signs  ED Triage Vitals   Temperature Pulse Respirations Blood Pressure SpO2   09/10/22 1749 09/10/22 1749 09/10/22 1749 09/10/22 1749 09/10/22 1749   98 1 °F (36 7 °C) 105 12 142/85 99 %      Temp Source Heart Rate Source Patient Position - Orthostatic VS BP Location FiO2 (%)   09/10/22 1749 09/10/22 1749 09/10/22 1749 09/10/22 1749 --   Oral Monitor Lying Left arm       Pain Score       09/10/22 1838       10 - Worst Possible Pain           Vitals:    09/10/22 1749   BP: 142/85   Pulse: 105   Patient Position - Orthostatic VS: Lying         Visual Acuity      ED Medications  Medications   cefTRIAXone (ROCEPHIN) IVPB (premix in dextrose) 1,000 mg 50 mL (1,000 mg Intravenous New Bag 9/10/22 1953)   morphine injection 4 mg (4 mg Intravenous Given 9/10/22 1942)   tamsulosin (FLOMAX) capsule 0 4 mg (has no administration in time range)   lactated ringers bolus 1,000 mL (1,000 mL Intravenous New Bag 9/10/22 1840)   ketorolac (TORADOL) injection 30 mg (30 mg Intravenous Given 9/10/22 1838)       Diagnostic Studies  Results Reviewed     Procedure Component Value Units Date/Time    Urine Microscopic [[id number]]  (Abnormal) Collected: 09/10/22 1828    Lab Status: Final result Specimen: Urine, Clean Catch Updated: 09/10/22 1849     RBC, UA 1-2 /hpf      WBC, UA 4-10 /hpf      Epithelial Cells Moderate /hpf      Bacteria, UA Moderate /hpf      MUCUS THREADS Occasional    Comprehensive metabolic panel [[id number]]  (Abnormal) Collected: 09/10/22 1823    Lab Status: Final result Specimen: Blood from Arm, Right Updated: 09/10/22 1842     Sodium 132 mmol/L      Potassium 4 9 mmol/L      Chloride 101 mmol/L      CO2 25 mmol/L      ANION GAP 6 mmol/L      BUN 14 mg/dL      Creatinine 0 88 mg/dL      Glucose 104 mg/dL      Calcium 8 7 mg/dL      AST 38 U/L      ALT 25 U/L      Alkaline Phosphatase 69 U/L      Total Protein 8 4 g/dL      Albumin 4 4 g/dL      Total Bilirubin 0 90 mg/dL      eGFR 87 ml/min/1 73sq m     Narrative:      Hemolysis  National Kidney Disease Foundation guidelines for Chronic Kidney Disease (CKD):     Stage 1 with normal or high GFR (GFR > 90 mL/min/1 73 square meters)    Stage 2 Mild CKD (GFR = 60-89 mL/min/1 73 square meters)    Stage 3A Moderate CKD (GFR = 45-59 mL/min/1 73 square meters)    Stage 3B Moderate CKD (GFR = 30-44 mL/min/1 73 square meters)    Stage 4 Severe CKD (GFR = 15-29 mL/min/1 73 square meters)    Stage 5 End Stage CKD (GFR <15 mL/min/1 73 square meters)  Note: GFR calculation is accurate only with a steady state creatinine    UA w Reflex to Microscopic w Reflex to Culture [[id number]]  (Abnormal) Collected: 09/10/22 1828    Lab Status: Final result Specimen: Urine, Clean Catch Updated: 09/10/22 1837     Color, UA Yellow     Clarity, UA Clear     Specific [place], UA 1 020     pH, UA 5 0     Leukocytes,  0     Nitrite, UA Negative     Protein, UA 30 (1+) mg/dl      Glucose, UA Negative mg/dl      Ketones, UA 15 (1+) mg/dl      Bilirubin, UA Negative     Occult Blood, UA 25 0     UROBILINOGEN UA Negative mg/dL     CBC and differential [[id number]]  (Abnormal) Collected: 09/10/22 1823    Lab Status: Final result Specimen: Blood from Arm, Right Updated: 09/10/22 1830     WBC 13 05 Thousand/uL      RBC 4 58 Million/uL      Hemoglobin 13 1 g/dL      Hematocrit 39 4 %      MCV 86 fL      MCH 28 6 pg      MCHC 33 2 g/dL      RDW 12 3 %      MPV 10 0 fL      Platelets 172 Thousands/uL      nRBC 0 /100 WBCs      Neutrophils Relative 84 %      Immat GRANS % 0 %      Lymphocytes Relative 12 %      Monocytes Relative 4 %      Eosinophils Relative 0 %      Basophils Relative 0 %      Neutrophils Absolute 10 90 Thousands/µL      Immature Grans Absolute 0 03 Thousand/uL      Lymphocytes Absolute 1 60 Thousands/µL      Monocytes Absolute 0 47 Thousand/µL      Eosinophils Absolute 0 02 Thousand/µL      Basophils Absolute 0 03 Thousands/µL     POCT pregnancy, urine [[id number]]  (Normal) Resulted: 09/10/22 1828    Lab Status: Final result Specimen: Urine Updated: 09/10/22 1828     EXT PREG TEST UR (Ref: Negative) negative     Control valid                 CT renal stone study abdomen pelvis wo contrast   Final Result by Sandra Nelson MD (09/10 1921)      1  A 3 mm distal left ureteral calculus just proximal to the left UVJ causes mild upstream hydroureteronephrosis  2   Findings suggesting cystitis  Correlate with urinalysis  The study was marked in [facility] for immediate notification        Workstation performed: [id number]                    Procedures  Procedures         ED Course  ED Course as of 09/10/22 2416   Sat Sep 10, 2022   1837 Leukocytes, UA(!): 100 0   1850 Epithelial Cells(!): Moderate   1852 WBC, UA(!): 4-10   1922 CT renal stone study abdomen pelvis wo contrast     1  A 3 mm distal left ureteral calculus just proximal to the left UVJ causes mild upstream hydroureteronephrosis      2  Findings suggesting cystitis  Correlate with urinalysis  1934 Patient has a infected stone  Will admit for IV abx  Patient agreeable with this plan, as well as pain control                               SBIRT 20yo+    Flowsheet Row Most Recent Value   SBIRT (23 yo +)    In order to provide better care to our patients, we are screening all of our patients for alcohol and drug use  Would it be okay to ask you these screening questions? No Filed at: 09/10/2022 5224                    MDM  Number of Diagnoses or Management Options  Flank pain: new and requires workup  Hydronephrosis with urinary obstruction due to ureteral calculus: new and requires workup  UTI (urinary tract infection): new and requires workup  Diagnosis management comments: Patient comes in for left flank pain that started yesterday that started at vagina, now radiates up into her left flank  Patient also has hematuria and dysuria  Patient does have an infected stone on CT scan, and UA  Patient is willing to stay for pain control, and IV antibiotics  Patient admitted Family Medicine    Counseling: I had a detailed discussion with the patient and/or guardian regarding: the historical points, exam findings, and any diagnostic results supporting the discharge diagnosis, lab results, radiology results, discharge instructions reviewed with patient and/or family/caregiver and understanding was verbalized   Instructions given to return to the emergency department if symptoms worsen or persist, or if there are any questions or concerns that arise at home       All labs reviewed and utilized in the medical decision making process     All radiology studies independently viewed by me and interpreted by the radiologist     Portions of the record may have been created with voice recognition software   Occasional wrong word or "sound a like" substitutions may have occurred due to the inherent limitations of voice recognition software   Read the chart carefully and recognize, using context, where substitutions have occurred  Amount and/or Complexity of Data Reviewed  Clinical lab tests: ordered and reviewed  Tests in the radiology section of CPT®: ordered and reviewed    Risk of Complications, Morbidity, and/or Mortality  Presenting problems: low  Diagnostic procedures: minimal  Management options: minimal    Patient Progress  Patient progress: stable      Disposition  Final diagnoses:   UTI (urinary tract infection)   Flank pain   Hydronephrosis with urinary obstruction due to ureteral calculus     Time reflects when diagnosis was documented in both MDM as applicable and the Disposition within this note     Time User Action Codes Description Comment    9/10/2022  7:47 PM Kemi Bard Add [N39 0] UTI (urinary tract infection)     9/10/2022  7:47 PM Kemi Bard Add [R10 9] Flank pain     9/10/2022  7:49 PM Kemi Bard Add [N13 2] Hydronephrosis with urinary obstruction due to ureteral calculus       ED Disposition     ED Disposition   Admit    Condition   Stable    Date/Time   Sat Sep 10, 2022  7:47 PM    Comment   Case was discussed with Dr Cyn Diaz and the patient's admission status was agreed to be Admission Status: observation status to the service of Dr Saira Roldan              Follow-up Information     Follow up With Specialties Details Why Contact Info Additional Information    Yuly Smith MD [facility] [phone]  [address] [place] [address],[address] [facility] ([place], [place]) Emergency Department Emergency Medicine  As needed, If symptoms worsen [address] [facility] [place] [phone]  [address] [facility] Emergency Department          Patient's Medications   Discharge Prescriptions    No medications on file       No discharge procedures on file      PDMP Review     None          ED Provider  Electronically Signed by           Stacey Huntley PA-C  09/10/22 1957

## 2022-09-11 VITALS
DIASTOLIC BLOOD PRESSURE: 72 MMHG | OXYGEN SATURATION: 100 % | TEMPERATURE: 97.7 F | RESPIRATION RATE: 18 BRPM | BODY MASS INDEX: 30.95 KG/M2 | WEIGHT: 168.21 LBS | HEART RATE: 69 BPM | SYSTOLIC BLOOD PRESSURE: 106 MMHG | HEIGHT: 62 IN

## 2022-09-11 LAB
ALBUMIN SERPL BCP-MCNC: 4 G/DL (ref 3.5–5)
ALP SERPL-CCNC: 71 U/L (ref 43–122)
ALT SERPL W P-5'-P-CCNC: 21 U/L
ANION GAP SERPL CALCULATED.3IONS-SCNC: 9 MMOL/L (ref 5–14)
AST SERPL W P-5'-P-CCNC: 31 U/L (ref 14–36)
BASOPHILS # BLD AUTO: 0.02 THOUSANDS/ΜL (ref 0–0.1)
BASOPHILS NFR BLD AUTO: 0 % (ref 0–1)
BILIRUB SERPL-MCNC: 0.55 MG/DL (ref 0.2–1)
BUN SERPL-MCNC: 12 MG/DL (ref 5–25)
CALCIUM SERPL-MCNC: 8.6 MG/DL (ref 8.4–10.2)
CHLORIDE SERPL-SCNC: 102 MMOL/L (ref 96–108)
CO2 SERPL-SCNC: 22 MMOL/L (ref 21–32)
CREAT SERPL-MCNC: 0.72 MG/DL (ref 0.6–1.2)
EOSINOPHIL # BLD AUTO: 0.01 THOUSAND/ΜL (ref 0–0.61)
EOSINOPHIL NFR BLD AUTO: 0 % (ref 0–6)
ERYTHROCYTE [DISTWIDTH] IN BLOOD BY AUTOMATED COUNT: 12.2 % (ref 11.6–15.1)
GFR SERPL CREATININE-BSD FRML MDRD: 111 ML/MIN/1.73SQ M
GLUCOSE SERPL-MCNC: 120 MG/DL (ref 70–99)
HCT VFR BLD AUTO: 39.6 % (ref 34.8–46.1)
HGB BLD-MCNC: 12.9 G/DL (ref 11.5–15.4)
IMM GRANULOCYTES # BLD AUTO: 0.04 THOUSAND/UL (ref 0–0.2)
IMM GRANULOCYTES NFR BLD AUTO: 0 % (ref 0–2)
LYMPHOCYTES # BLD AUTO: 1.81 THOUSANDS/ΜL (ref 0.6–4.47)
LYMPHOCYTES NFR BLD AUTO: 16 % (ref 14–44)
MCH RBC QN AUTO: 28.4 PG (ref 26.8–34.3)
MCHC RBC AUTO-ENTMCNC: 32.6 G/DL (ref 31.4–37.4)
MCV RBC AUTO: 87 FL (ref 82–98)
MONOCYTES # BLD AUTO: 0.6 THOUSAND/ΜL (ref 0.17–1.22)
MONOCYTES NFR BLD AUTO: 5 % (ref 4–12)
NEUTROPHILS # BLD AUTO: 8.95 THOUSANDS/ΜL (ref 1.85–7.62)
NEUTS SEG NFR BLD AUTO: 79 % (ref 43–75)
NRBC BLD AUTO-RTO: 0 /100 WBCS
PLATELET # BLD AUTO: 262 THOUSANDS/UL (ref 149–390)
PMV BLD AUTO: 10.5 FL (ref 8.9–12.7)
POTASSIUM SERPL-SCNC: 4.1 MMOL/L (ref 3.5–5.3)
PROT SERPL-MCNC: 7.6 G/DL (ref 6.4–8.4)
RBC # BLD AUTO: 4.54 MILLION/UL (ref 3.81–5.12)
SODIUM SERPL-SCNC: 133 MMOL/L (ref 135–147)
WBC # BLD AUTO: 11.43 THOUSAND/UL (ref 4.31–10.16)

## 2022-09-11 PROCEDURE — 80053 COMPREHEN METABOLIC PANEL: CPT | Performed by: STUDENT IN AN ORGANIZED HEALTH CARE EDUCATION/TRAINING PROGRAM

## 2022-09-11 PROCEDURE — NC001 PR NO CHARGE: Performed by: FAMILY MEDICINE

## 2022-09-11 PROCEDURE — 85025 COMPLETE CBC W/AUTO DIFF WBC: CPT | Performed by: STUDENT IN AN ORGANIZED HEALTH CARE EDUCATION/TRAINING PROGRAM

## 2022-09-11 PROCEDURE — 99219 PR INITIAL OBSERVATION CARE/DAY 50 MINUTES: CPT | Performed by: FAMILY MEDICINE

## 2022-09-11 RX ORDER — TAMSULOSIN HYDROCHLORIDE 0.4 MG/1
0.4 CAPSULE ORAL
Qty: 14 CAPSULE | Refills: 0 | Status: SHIPPED | OUTPATIENT
Start: 2022-09-11

## 2022-09-11 RX ORDER — CEPHALEXIN 500 MG/1
500 CAPSULE ORAL EVERY 6 HOURS SCHEDULED
Qty: 20 CAPSULE | Refills: 0 | Status: SHIPPED | OUTPATIENT
Start: 2022-09-12 | End: 2022-09-17

## 2022-09-11 RX ORDER — IBUPROFEN 800 MG/1
800 TABLET ORAL EVERY 6 HOURS PRN
Qty: 30 TABLET | Refills: 0 | Status: SHIPPED | OUTPATIENT
Start: 2022-09-11

## 2022-09-11 RX ADMIN — SODIUM CHLORIDE 100 ML/HR: 0.9 INJECTION, SOLUTION INTRAVENOUS at 04:42

## 2022-09-11 RX ADMIN — CEFTRIAXONE 1000 MG: 1 INJECTION, SOLUTION INTRAVENOUS at 07:24

## 2022-09-11 RX ADMIN — MORPHINE SULFATE 2 MG: 2 INJECTION, SOLUTION INTRAMUSCULAR; INTRAVENOUS at 04:26

## 2022-09-11 RX ADMIN — NORETHINDRONE ACETATE AND ETHINYL ESTRADIOL 1 TABLET: KIT at 08:11

## 2022-09-11 RX ADMIN — TAMSULOSIN HYDROCHLORIDE 0.4 MG: 0.4 CAPSULE ORAL at 15:38

## 2022-09-11 NOTE — H&P
History and Physical - Artie Irby    Patient Information: Jose Roberto Hamilton 28 y o  female MRN: [id number]  Unit/Bed#: 7T [facility] 709-01 Encounter: [id number]  Admitting Physician: Diya Headley MD  PCP: Kane Thomas MD  Date of Admission:  09/10/22    Assessment and Plan    * Urolithiasis with superimposing UTI  Assessment & Plan  Urolithiasis with UTI  Cystitis vs Pyelonephritis  Low suspicion for pyelonephritis since no fever and no signs of systemic illness  UA significant for WBC and bacteria  WBC: 13 05  CT Renal study Abd/Pelvis significant for left ureteral calculus and hydroureteronephrosis  ED Course:   - Received 1L lactated ringers, 1g Rocephin, 30mg IV toradol, 4mg IV morphine, and 0 4mg Tamsulosin    Plan  - Will need to control pain and treat UTI with antibiotics  - 1g IV Rocephin daily  Consider switching to PO once symptoms start to improve  - Tamsulosin 0 4 mg qHS  - Toradol 15mg q6 PRN, Morphine 2mg q4 PRN, Tylenol 650mg q6 PRN for pain control  - Maintenance fluids 100ml/hr  - Strict intake & outputs  - Repeat CBC & CMP in AM      VTE Prophylaxis: VTE score = 3  Pharmacological prophylaxis not indicated  Mechanical VTE Prophylaxis in Place: Yes    Code Status: Level 1 - Full Code  Anticipated Length of Stay:  Patient will be admitted on an Observation basis with an anticipated length of stay of  Less than 2 midnights  Justification for Hospital Stay: Cystitis secondary to urinary obstruction   Total Time for Visit, including Counseling / Coordination of Care: 45 mins  Greater than 50% of this total time spent on direct patient counseling and coordination of care        Chief Complaint:     Chief Complaint   Patient presents with    Flank Pain     Pt reports left side flank pain, vaginal pain and left side abdominal pain     History of Present Illness:    Jose Roberto Hamilton is a 28 y o  female who presents with a 2 day history of left-sided flank pain  Patient describes the pain as a sharp pain that started in the left flank and moved to the vaginal area  Pain is rated 10/10  Patient tried Tylenol for pain relief  Patient admits to dysuria, urinary frequency, hematuria, nausea  Patient denies fever, chills, vaginal discharge, recent STI, shortness of breath, chest pain or palpitations  ED Course   UA positive for Leukocytes, bacteria, & blood  WBC elevated at 13 05  CT of abdomen/pelvis significant for left ureteral calculus  At bedside patient received 1 L lactated Ringer's, 1 g Rocephin, 10 mg IV Toradol, 4 mg IV morphine, and 0 4 mg tamsulosin  Family Medicine Inpatient service was consulted and the decision was made to admit patient for pain control and antibiotic treatment of urolithiasis with UTI  Plan was discussed with patient who acknowledged and agreed  Review of Systems:  Review of Systems   Constitutional: Negative for chills and fever  HENT: Negative for congestion  Eyes: Negative for visual disturbance  Respiratory: Negative for cough, shortness of breath and wheezing  Cardiovascular: Negative for chest pain, palpitations and leg swelling  Gastrointestinal: Positive for abdominal pain and nausea  Negative for constipation, diarrhea and vomiting  Genitourinary: Positive for dysuria, flank pain, frequency, hematuria and pelvic pain  Negative for decreased urine volume, difficulty urinating, urgency and vaginal discharge  Musculoskeletal: Negative for arthralgias and back pain  Skin: Negative for color change and rash  Neurological: Negative for dizziness, seizures, syncope, weakness and light-headedness  All other systems reviewed and are negative        Past Medical and Surgical History:   Past Medical History:   Diagnosis Date    Hyperlipidemia     previously tx with Simvastatin, no meds since 2017     Past Surgical History:   Procedure Laterality Date    NO PAST SURGERIES Meds/Allergies: Allergies: No Known Allergies  Prior to Admission Medications   Prescriptions Last Dose Informant Patient Reported? Taking?   norethindrone-ethinyl estradiol (JUNEL FE 1/20) 1-20 MG-MCG per tablet 9/10/2022 at Unknown time  No Yes   Sig: Take 1 tablet by mouth in the morning  Facility-Administered Medications: None     Social History:     Social History     Socioeconomic History    Marital status: /Civil Union     Spouse name: Not on file    Number of children: Not on file    Years of education: Not on file    Highest education level: Not on file   Occupational History    Not on file   Tobacco Use    Smoking status: Never Smoker    Smokeless tobacco: Never Used   Vaping Use    Vaping Use: Never used   Substance and Sexual Activity    Alcohol use: Yes    Drug use: Never    Sexual activity: Yes     Partners: Male     Birth control/protection: OCP   Other Topics Concern    Not on file   Social History Narrative    Not on file     Social Determinants of Health     Financial Resource Strain: Low Risk     Difficulty of Paying Living Expenses: Not hard at all   Food Insecurity: No Food Insecurity    Worried About Running Out of Food in the Last Year: Never true    Tanika of Food in the Last Year: Never true   Transportation Needs: No Transportation Needs    Lack of Transportation (Medical): No    Lack of Transportation (Non-Medical):  No   Physical Activity: Not on file   Stress: Not on file   Social Connections: Not on file   Intimate Partner Violence: Not on file   Housing Stability: Low Risk     Unable to Pay for Housing in the Last Year: No    Number of Places Lived in the Last Year: 1    Unstable Housing in the Last Year: No     Patient Pre-hospital Living Situation: With family  Patient Pre-hospital Level of Mobility: Self ambulation  Patient Pre-hospital Diet Restrictions: None    Family History:  Family History   Problem Relation Age of Onset    No Known Problems Mother     No Known Problems Father     No Known Problems Daughter     Cancer Neg Hx     Diabetes Neg Hx     Breast cancer Neg Hx     Colon cancer Neg Hx     Ovarian cancer Neg Hx     Deep vein thrombosis Neg Hx     Venous thrombosis Neg Hx        Physical Exam:   Vitals:   Blood Pressure: 118/79 (09/10/22 2300)  Pulse: 91 (09/10/22 2300)  Temperature: (!) 97 1 °F (36 2 °C) (09/10/22 2300)  Temp Source: Temporal (09/10/22 2300)  Respirations: 16 (09/10/22 2300)  Height: 5' 2" (157 5 cm) (09/10/22 2110)  Weight - Scale: 76 3 kg (168 lb 3 4 oz) (09/10/22 2110)  SpO2: 100 % (09/10/22 2300)    Physical Exam  Vitals reviewed  Constitutional:       General: She is not in acute distress  Appearance: She is well-developed  She is not diaphoretic  HENT:      Head: Normocephalic and atraumatic  Nose: Nose normal    Eyes:      Extraocular Movements: Extraocular movements intact  Conjunctiva/sclera: Conjunctivae normal       Pupils: Pupils are equal, round, and reactive to light  Cardiovascular:      Rate and Rhythm: Regular rhythm  Tachycardia present  Heart sounds: Normal heart sounds  No murmur heard  No friction rub  No gallop  Pulmonary:      Effort: Pulmonary effort is normal  No respiratory distress  Breath sounds: Normal breath sounds  No wheezing or rales  Abdominal:      General: Bowel sounds are normal  There is no distension  Palpations: Abdomen is soft  Tenderness: There is abdominal tenderness in the left upper quadrant  There is left CVA tenderness  There is no right CVA tenderness or guarding  Musculoskeletal:         General: Normal range of motion  Cervical back: Normal range of motion and neck supple  Skin:     General: Skin is warm and dry  Findings: No erythema or rash  Neurological:      Mental Status: She is alert and oriented to person, place, and time  Lab Results: I have personally reviewed pertinent reports  Results from last 7 days   Lab Units 09/11/22  0435   WBC Thousand/uL 11 43*   HEMOGLOBIN g/dL 12 9   HEMATOCRIT % 39 6   PLATELETS Thousands/uL 262   NEUTROS PCT % 79*   LYMPHS PCT % 16   MONOS PCT % 5   EOS PCT % 0     Results from last 7 days   Lab Units 09/10/22  1823   POTASSIUM mmol/L 4 9   CHLORIDE mmol/L 101   CO2 mmol/L 25   BUN mg/dL 14   CREATININE mg/dL 0 88   CALCIUM mg/dL 8 7   ALK PHOS U/L 69   ALT U/L 25   AST U/L 38*   EGFR ml/min/1 73sq m 87                          Results from last 7 days   Lab Units 09/10/22  1828   COLOR UA  Yellow   CLARITY UA  Clear   SPEC GRAV UA  1 020   PH UA  5 0   LEUKOCYTES UA  100 0*   NITRITE UA  Negative   GLUCOSE UA mg/dl Negative   KETONES UA mg/dl 15 (1+)*   BILIRUBIN UA  Negative   BLOOD UA  25 0*      Results from last 7 days   Lab Units 09/10/22  1828   RBC UA /hpf 1-2   WBC UA /hpf 4-10*   EPITHELIAL CELLS WET PREP /hpf Moderate*   BACTERIA UA /hpf Moderate*        Imaging: I have personally reviewed pertinent reports  CT renal stone study abdomen pelvis wo contrast    Result Date: 9/10/2022  Narrative: CT ABDOMEN AND PELVIS WITHOUT IV CONTRAST - LOW DOSE RENAL STONE INDICATION:      Impression: 1  A 3 mm distal left ureteral calculus just proximal to the left UVJ causes mild upstream hydroureteronephrosis  2   Findings suggesting cystitis  Correlate with urinalysis       [place] H&P was discussed with Dr Asuncion Taylor who agreed to what is noted above    Ginger Mcgregor MD  09/11/22  6:48 AM

## 2022-09-11 NOTE — DISCHARGE SUMMARY
Discharge Summary - Artie Irby    Patient Information: Ever Mejia 28 y o  female MRN: [id number]  Unit/Bed#: 7T [facility] 709-01 Encounter: [id number]    Discharging Physician / Practitioner: ARTHUR Lai  PCP: Sky Durbin MD  Admission Date:   Admission Orders (From admission, onward)     Ordered        09/10/22 1950  Place in Observation  Once                      Discharge Date: 9/11/22     Reason for Admission:  Urolithiasis, UTI requiring IV antibiotics and pain control    Discharge Diagnoses:     Principal Problem:    Urolithiasis with superimposing UTI  Resolved Problems:    Hydronephrosis with urinary obstruction due to ureteral calculus        * Urolithiasis with superimposing UTI  Assessment & Plan  Upon discharge patient reports improvement of pain and in no acute distress  Plan  - Keflex 500 mg q6h for 5 days  -Ibuprofen 800 mg q6h prn for mild to moderate pain  - ContinueTamsulosin 0 4 mg qHS for 2 weeks         Consultations During Hospital Stay:  · None    Procedures Performed:   · None    Significant Findings / Test Results:   · CTA 1  A 3 mm distal left ureteral calculus just proximal to the left UVJ causes mild upstream hydroureteronephrosis  · Urinalysis leukocytes, protein, blood, moderate bacteria    Incidental Findings:   · None     Test Results Pending at Discharge (will require follow up): · None     Outpatient Tests Requested:  · None    Outpatient follow-up Requested:   F/u with PCP    Complications:  nONE    Hospital Course:     Ever Mejia is a 28 y o  female patient who originally presented to the hospital on 9/10/2022 due to right CVA tenderness and urolithiasis with superimposed UTI  Patient was treated with IV treatment with ceftriaxone and pain control with Toradol, morphine and acetaminophen  On day of discharge patient reports feeling well with no pain  Decision was made to discharge the patient on oral antibiotics  Discharge plans and management were discussed with the patient and she acknowledged and agreed  Condition at Discharge: fair     Discharge Day Visit / Exam:     Vitals: Blood Pressure: 106/72 (09/11/22 1527)  Pulse: 69 (09/11/22 1527)  Temperature: 97 7 °F (36 5 °C) (09/11/22 1527)  Temp Source: Temporal (09/11/22 1527)  Respirations: 18 (09/11/22 1527)  Height: 5' 2" (157 5 cm) (09/10/22 2110)  Weight - Scale: 76 3 kg (168 lb 3 4 oz) (09/10/22 2110)  SpO2: 100 % (09/11/22 1527)  Exam:   Physical Exam  Vitals and nursing note reviewed  Constitutional:       Appearance: Normal appearance  HENT:      Head: Normocephalic and atraumatic  Eyes:      Conjunctiva/sclera: Conjunctivae normal    Cardiovascular:      Rate and Rhythm: Normal rate and regular rhythm  Pulses: Normal pulses  Heart sounds: Normal heart sounds  No murmur heard  No friction rub  No gallop  Pulmonary:      Effort: Pulmonary effort is normal       Breath sounds: Normal breath sounds  Abdominal:      General: Abdomen is flat  Bowel sounds are normal  There is no distension  Palpations: Abdomen is soft  Tenderness: There is no abdominal tenderness  There is no right CVA tenderness, left CVA tenderness, guarding or rebound  Skin:     General: Skin is warm and dry  Capillary Refill: Capillary refill takes less than 2 seconds  Neurological:      General: No focal deficit present  Mental Status: She is alert  Psychiatric:         Behavior: Behavior normal            Discussion with Family: No  Patient Information Sharing: No  Discharge instructions/Information to patient and family:   See after visit summary for information provided to patient and family        Discharge Medications:  Cephalexin 500 mg Oral Every 6 hours scheduled  Ibuprofen 800 mg Oral Every 6 hours PRN  Norethin Ace-Eth Estrad-FE 1-20 MG-MCG 1 tablet Oral Daily  Tamsulosin HCl 0 4 mg Oral Daily with dinner          Provisions for Follow-Up Care:  See after visit summary for information related to follow-up care and any pertinent home health orders  Disposition:     Home    For Discharges to [facility]:   · Not Applicable to this Patient - Not Applicable to this Patient    Planned Readmission: No     Discharge Statement:  I spent 60 minutes discharging the patient  This time was spent on the day of discharge  I had direct contact with the patient on the day of discharge  Greater than 50% of the total time was spent examining patient, answering all patient questions, arranging and discussing plan of care with patient as well as directly providing post-discharge instructions  Additional time then spent on discharge activities      Glenda Martinez MD  09/11/22  3:49 PM

## 2022-09-11 NOTE — UTILIZATION REVIEW
Initial Clinical Review    Admission: Date/Time/Statement:   Admission Orders (From admission, onward)     Ordered        09/10/22 1950  Place in Observation  Once                      Orders Placed This Encounter   Procedures    Place in Observation     Standing Status:   Standing     Number of Occurrences:   1     Order Specific Question:   Level of Care     Answer:   Med Surg [16]     ED Arrival Information     Expected   -    Arrival   9/10/2022 17:36    Acuity   Urgent            Means of arrival   Walk-In    Escorted by   Self    Service   Hospitalist    Admission type   Urgent            Arrival complaint   3 hours of sever abdominal pain            Chief Complaint   Patient presents with    Flank Pain     Pt reports left side flank pain, vaginal pain and left side abdominal pain       Initial Presentation: 28 y o  female presented to the ED with a 2 day history of left-sided flank pain  Patient describes the pain as a sharp pain that started in the left flank and moved to the vaginal area  Pain is rated 10/10  Patient tried Tylenol for pain relief  Patient admits to dysuria, urinary frequency, hematuria, nausea  PMH: HLD  Plan: Observation for urolithiasis with superimposing UTI: IV Rocephin, Tamsulosin, pain management, IV fluids, repeat CBC and CMP in am      9/11:    Internal Medicine: Continue IV Rocephin, Tamsulosin, pain management, IV fluids       ED Triage Vitals   Temperature Pulse Respirations Blood Pressure SpO2   09/10/22 1749 09/10/22 1749 09/10/22 1749 09/10/22 1749 09/10/22 1749   98 1 °F (36 7 °C) 105 12 142/85 99 %      Temp Source Heart Rate Source Patient Position - Orthostatic VS BP Location FiO2 (%)   09/10/22 1749 09/10/22 1749 09/10/22 1749 09/10/22 1749 --   Oral Monitor Lying Left arm       Pain Score       09/10/22 1838       10 - Worst Possible Pain          Wt Readings from Last 1 Encounters:   09/10/22 76 3 kg (168 lb 3 4 oz)     Additional Vital Signs:     Date/Time Temp Pulse Resp BP MAP (mmHg) SpO2 O2 Device   09/11/22 0732 97 °F (36 1 °C) Abnormal  62 18 113/74 -- 100 % None (Room air)   09/10/22 2300 97 1 °F (36 2 °C) Abnormal  91 16 118/79 -- 100 % --   09/10/22 2110 98 1 °F (36 7 °C) 93 16 116/77 -- 100 % --   09/10/22 2030 -- 102 -- 131/79 96 99 % --       Pertinent Labs/Diagnostic Test Results:   CT renal stone study abdomen pelvis wo contrast   Final Result by Stephen Pagan MD (09/10 1921)      1  A 3 mm distal left ureteral calculus just proximal to the left UVJ causes mild upstream hydroureteronephrosis  2   Findings suggesting cystitis  Correlate with urinalysis  The study was marked in [facility] for immediate notification        Workstation performed: [id number]               Results from last 7 days   Lab Units 09/11/22 0435 09/10/22  1823   WBC Thousand/uL 11 43* 13 05*   HEMOGLOBIN g/dL 12 9 13 1   HEMATOCRIT % 39 6 39 4   PLATELETS Thousands/uL 262 259   NEUTROS ABS Thousands/µL 8 95* 10 90*         Results from last 7 days   Lab Units 09/11/22 0435 09/10/22  1823   SODIUM mmol/L 133* 132*   POTASSIUM mmol/L 4 1 4 9   CHLORIDE mmol/L 102 101   CO2 mmol/L 22 25   ANION GAP mmol/L 9 6   BUN mg/dL 12 14   CREATININE mg/dL 0 72 0 88   EGFR ml/min/1 73sq m 111 87   CALCIUM mg/dL 8 6 8 7     Results from last 7 days   Lab Units 09/11/22  0435 09/10/22  1823   AST U/L 31 38*   ALT U/L 21 25   ALK PHOS U/L 71 69   TOTAL PROTEIN g/dL 7 6 8 4   ALBUMIN g/dL 4 0 4 4   TOTAL BILIRUBIN mg/dL 0 55 0 90         Results from last 7 days   Lab Units 09/11/22  0435 09/10/22  1823   GLUCOSE RANDOM mg/dL 120* 104*         Results from last 7 days   Lab Units 09/10/22  1828   CLARITY UA  Clear   COLOR UA  Yellow   SPEC GRAV UA  1 020   PH UA  5 0   GLUCOSE UA mg/dl Negative   KETONES UA mg/dl 15 (1+)*   BLOOD UA  25 0*   PROTEIN UA mg/dl 30 (1+)*   NITRITE UA  Negative   BILIRUBIN UA  Negative   UROBILINOGEN UA mg/dL Negative   LEUKOCYTES UA  100 0*   WBC UA /hpf 4-10*   RBC UA /hpf 1-2   BACTERIA UA /hpf Moderate*   EPITHELIAL CELLS WET PREP /hpf Moderate*   MUCUS THREADS  Occasional*         ED Treatment:   Medication Administration from 09/10/2022 1736 to 09/10/2022 2047       Date/Time Order Dose Route Action     09/10/2022 1840 lactated ringers bolus 1,000 mL 1,000 mL Intravenous New Bag     09/10/2022 1838 ketorolac (TORADOL) injection 30 mg 30 mg Intravenous Given     09/10/2022 1953 cefTRIAXone (ROCEPHIN) IVPB (premix in dextrose) 1,000 mg 50 mL 1,000 mg Intravenous New Bag     09/10/2022 1942 morphine injection 4 mg 4 mg Intravenous Given     09/10/2022 1959 tamsulosin (FLOMAX) capsule 0 4 mg 0 4 mg Oral Given        Past Medical History:   Diagnosis Date    Hyperlipidemia     previously tx with Simvastatin, no meds since 2017     Present on Admission:  **None**      Admitting Diagnosis: UTI (urinary tract infection) [N39 0]  Flank pain [R10 9]  Hydronephrosis with urinary obstruction due to ureteral calculus [N13 2]  Age/Sex: 28 y o  female  Admission Orders:  Scheduled Medications:  cefTRIAXone, 1,000 mg, Intravenous, Q24H  melatonin, 3 mg, Oral, HS  norethindrone-ethinyl estradiol, 1 tablet, Oral, Daily  tamsulosin, 0 4 mg, Oral, Daily With Dinner      Continuous IV Infusions:  sodium chloride, 100 mL/hr, Intravenous, Continuous      PRN Meds:  acetaminophen, 650 mg, Oral, Q6H PRN  ketorolac, 15 mg, Intravenous, Q6H PRN  morphine injection, 2 mg, Intravenous, Q4H PRN  ondansetron, 4 mg, Intravenous, Q6H PRN        None    Network Utilization Review Department  ATTENTION: Please call with any questions or concerns to [phone] and carefully listen to the prompts so that you are directed to the right person  All voicemails are confidential   Bette Navarro all requests for admission clinical reviews, approved or denied determinations and any other requests to dedicated fax number below belonging to the campus where the patient is receiving treatment   List of dedicated fax numbers for the Facilities:  FACILITY NAME UR FAX NUMBER   ADMISSION DENIALS (Administrative/Medical Necessity) [phone]   [address] (Maternity/NICU/Pediatrics) 261 [facility],7[address] Floor [facility] [address] [place] [address]  [phone]   Clifford Maguire [address] [address] [place] [address] [address] Yvette Whitman [address] [facility] [phone]

## 2022-09-11 NOTE — PROGRESS NOTES
Progress Note    Tariq Form 28 y o  female MRN: [id number]  Unit/Bed#: 7T [facility] 709-01 Encounter: [id number]  Admitting Physician: Consuelo Candelaria MD  PCP: Lola Newby MD  Date of Admission:  9/10/2022  5:43 PM    Assessment and Plan    * Urolithiasis with superimposing UTI  Assessment & Plan  22 WBC downtrending    Plan  - Continue 1g IV Rocephin (day 2)  Consider switching to PO tomorrow  - ContinueTamsulosin 0 4 mg qHS  - ContinueToradol 15mg q6 PRN, Morphine 2mg q4 PRN, Tylenol 650mg q6 PRN for pain control  - ContinueMaintenance fluids 100ml/hr  - Strict intake & outputs      VTE Pharmacologic Prophylaxis:   Pharmacologic: patient ambulating  Mechanical VTE Prophylaxis in Place: Yes    Patient Centered Rounds: I have performed bedside rounds with nursing staff today  Discussions with Specialists or Other Care Team Provider: No    Education and Discussions with Family / Patient: No  Patient Information Sharing: No  Time Spent for Care: 20 minutes  More than 50% of total time spent on counseling and coordination of care as described above  Current Length of Stay: 0 day(s)    Current Patient Status: Observation   Certification Statement: The patient will continue to require additional inpatient hospital stay due to pain management    Discharge Plan: less than 1 day     Code Status: Level 1 - Full Code      Subjective:   Patient on her first day of hospital stay was seen at bedside  She reports feeling well and improvement of her abdominal pain  Patient denies fever, nausea, vomiting, constipation  Patient endorses discomfort when she urinates due to mild pain with urination  She denies hematuria, polyuria      Objective:     Vitals:   Temp (24hrs), Av 6 °F (36 4 °C), Min:97 °F (36 1 °C), Max:98 1 °F (36 7 °C)    Temp:  [97 °F (36 1 °C)-98 1 °F (36 7 °C)] 97 °F (36 1 °C)  HR:  [] 62  Resp:  [12-18] 18  BP: (113-142)/(74-85) 113/74  SpO2:  [99 %-100 %] 100 %  Body mass index is 30 77 kg/m²  Input and Output Summary (last 24 hours): Intake/Output Summary (Last 24 hours) at 9/11/2022 1432  Last data filed at 9/11/2022 1300  Gross per 24 hour   Intake 3210 ml   Output 1200 ml   Net 2010 ml       Physical Exam:     Physical Exam  Constitutional:       Appearance: Normal appearance  HENT:      Head: Normocephalic and atraumatic  Eyes:      Conjunctiva/sclera: Conjunctivae normal    Cardiovascular:      Rate and Rhythm: Normal rate and regular rhythm  Pulses: Normal pulses  Heart sounds: Normal heart sounds  Pulmonary:      Effort: Pulmonary effort is normal       Breath sounds: Normal breath sounds  Abdominal:      General: Abdomen is flat  Bowel sounds are normal  There is no distension  Palpations: Abdomen is soft  There is no mass  Tenderness: There is no abdominal tenderness  There is no guarding or rebound  Skin:     General: Skin is warm and dry  Capillary Refill: Capillary refill takes less than 2 seconds  Neurological:      General: No focal deficit present  Mental Status: She is alert  Psychiatric:         Behavior: Behavior normal            Additional Data:     Labs:    Results from last 7 days   Lab Units 09/11/22  0435   WBC Thousand/uL 11 43*   HEMOGLOBIN g/dL 12 9   HEMATOCRIT % 39 6   PLATELETS Thousands/uL 262   NEUTROS PCT % 79*   LYMPHS PCT % 16   MONOS PCT % 5   EOS PCT % 0     Results from last 7 days   Lab Units 09/11/22  0435   POTASSIUM mmol/L 4 1   CHLORIDE mmol/L 102   CO2 mmol/L 22   BUN mg/dL 12   CREATININE mg/dL 0 72   CALCIUM mg/dL 8 6   ALK PHOS U/L 71   ALT U/L 21   AST U/L 31                     * I Have Reviewed All Lab Data Listed Above  * Additional Pertinent Lab Tests Reviewed:  Mohamud 66 Admission Reviewed    Imaging:    Imaging Reports Reviewed Today Include: CT of Abdomen    Recent Cultures (last 7 days):           Last 24 Hours Medication List:   Current Facility-Administered Medications   Medication Dose Route Frequency Provider Last Rate    acetaminophen  650 mg Oral Q6H PRN Abigail Bane Niyibizi, DO      cefTRIAXone  1,000 mg Intravenous Q24H Abigail Nathan [id number], DO 1,000 mg (09/11/22 0724)    ketorolac  15 mg Intravenous Q6H PRN Abigail Nathan Niyibizi, DO      melatonin  3 mg Oral HS Abigail Nathan Uummannaq, DO      morphine injection  2 mg Intravenous Q4H PRN Abigail Banfilomena Niyibizi, DO      norethindrone-ethinyl estradiol  1 tablet Oral Daily Abigail Nathan [id number], DO      ondansetron  4 mg Intravenous Q6H PRN Abigail Nathan [id number], DO      sodium chloride  100 mL/hr Intravenous Continuous Abigail Nathan [place],  mL/hr (09/11/22 0442)    tamsulosin  0 4 mg Oral Daily With [employer] [id number] Consuelo ASHTON MD  09/11/22  2:32 PM

## 2022-09-11 NOTE — ASSESSMENT & PLAN NOTE
Upon discharge patient reports improvement of pain and in no acute distress  Plan    - Keflex 500 mg q6h for 5 days  -Ibuprofen 800 mg q6h prn for mild to moderate pain  - ContinueTamsulosin 0 4 mg qHS for 2 weeks

## 2022-09-13 ENCOUNTER — TRANSITIONAL CARE MANAGEMENT (OUTPATIENT)
Dept: FAMILY MEDICINE CLINIC | Facility: CLINIC | Age: 32
End: 2022-09-13

## 2022-09-13 LAB — BACTERIA UR CULT: NORMAL

## 2022-09-16 ENCOUNTER — OFFICE VISIT (OUTPATIENT)
Dept: FAMILY MEDICINE CLINIC | Facility: CLINIC | Age: 32
End: 2022-09-16

## 2022-09-16 VITALS
OXYGEN SATURATION: 99 % | WEIGHT: 167 LBS | BODY MASS INDEX: 30.73 KG/M2 | TEMPERATURE: 96.5 F | HEART RATE: 86 BPM | DIASTOLIC BLOOD PRESSURE: 60 MMHG | RESPIRATION RATE: 16 BRPM | SYSTOLIC BLOOD PRESSURE: 92 MMHG | HEIGHT: 62 IN

## 2022-09-16 DIAGNOSIS — Z76.89 ENCOUNTER FOR SUPPORT AND COORDINATION OF TRANSITION OF CARE: Primary | ICD-10-CM

## 2022-09-16 PROCEDURE — 99496 TRANSJ CARE MGMT HIGH F2F 7D: CPT | Performed by: FAMILY MEDICINE

## 2022-09-16 NOTE — ASSESSMENT & PLAN NOTE
Doing well post hospital visit  Asymptomatic  Currently finishing Keflex medication  Instructed patient to continue 10 move Zosyn for another week  Instructed patient to follow-up if patient symptoms reoccur  Will have to have workup done with Urology

## 2022-09-16 NOTE — PROGRESS NOTES
Assessment & Plan     1  Encounter for support and coordination of transition of care  Assessment & Plan:  Doing well post hospital visit  Asymptomatic  Currently finishing Keflex medication  Instructed patient to continue 10 move Zosyn for another week  Instructed patient to follow-up if patient symptoms reoccur  Will have to have workup done with Urology  Subjective     Transitional Care Management Review:   Araceli Barron is a 28 y o  female here for TCM follow up  During the TCM phone call patient stated:  TCM Call     Date and time call was made  9/13/2022 11:02 AM    Hospital care reviewed  Records reviewed    Patient was hospitialized at  [facility]    Date of Admission  09/10/22    Date of discharge  09/11/22    Diagnosis  Urolithiasis with superimposing UTI    Disposition  Home    Were the patients medications reviewed and updated  Yes    Current Symptoms  None      TCM Call     Post hospital issues  None    Should patient be enrolled in anticoag monitoring? No    Scheduled for follow up? Yes    Did you obtain your prescribed medications  Yes    Do you need help managing your prescriptions or medications  No    Is transportation to your appointment needed  No    I have advised the patient to call PCP with any new or worsening symptoms  Ketan 53 members    Are you recieving any outpatient services  No    Are you recieving home care services  No    Are you using any community resources  No    Current waiver services  No    Have you fallen in the last 12 months  No    Interperter language line needed  No    Counseling  Patient        Patient coming in for transition of care  Was recently admitted to the hospital secondary to kidney stone found in the ureter junction  Since hospital visit patient reports that she is asymptomatic  She has no fevers or chills  She has no dysuria    Though she states she did have a recent menstrual cycle, she states that she has no blood in her urine  Patient is completing her course of antibiotics she finishes tomorrow  Review of Systems   Constitutional: Negative for chills and fever  HENT: Negative for ear pain and sore throat  Eyes: Negative for pain and visual disturbance  Respiratory: Negative for cough and shortness of breath  Cardiovascular: Negative for chest pain and palpitations  Gastrointestinal: Negative for abdominal pain and vomiting  Genitourinary: Negative for dysuria and hematuria  Musculoskeletal: Negative for arthralgias and back pain  Skin: Negative for color change and rash  Neurological: Negative for seizures and syncope  All other systems reviewed and are negative  Objective     BP 92/60 (BP Location: Left arm, Patient Position: Sitting, Cuff Size: Standard)   Pulse 86   Temp (!) 96 5 °F (35 8 °C) (Temporal)   Resp 16   Ht 5' 2" (1 575 m)   Wt 75 8 kg (167 lb)   LMP 08/30/2022 (Approximate)   SpO2 99%   Breastfeeding No   BMI 30 54 kg/m²      Physical Exam  Vitals and nursing note reviewed  Constitutional:       General: She is not in acute distress  Appearance: She is well-developed  HENT:      Head: Normocephalic and atraumatic  Eyes:      Conjunctiva/sclera: Conjunctivae normal    Cardiovascular:      Rate and Rhythm: Normal rate and regular rhythm  Heart sounds: No murmur heard  Pulmonary:      Effort: Pulmonary effort is normal  No respiratory distress  Breath sounds: Normal breath sounds  Abdominal:      Palpations: Abdomen is soft  Tenderness: There is no abdominal tenderness  Musculoskeletal:      Cervical back: Neck supple  Skin:     General: Skin is warm and dry  Neurological:      Mental Status: She is alert         Howie Richardson, DO

## 2022-10-04 ENCOUNTER — TELEPHONE (OUTPATIENT)
Dept: FAMILY MEDICINE CLINIC | Facility: CLINIC | Age: 32
End: 2022-10-04

## 2023-10-25 ENCOUNTER — ANNUAL EXAM (OUTPATIENT)
Dept: OBGYN CLINIC | Facility: CLINIC | Age: 33
End: 2023-10-25

## 2023-10-25 VITALS
WEIGHT: 168 LBS | BODY MASS INDEX: 28.68 KG/M2 | HEIGHT: 64 IN | DIASTOLIC BLOOD PRESSURE: 68 MMHG | SYSTOLIC BLOOD PRESSURE: 101 MMHG | HEART RATE: 87 BPM

## 2023-10-25 DIAGNOSIS — Z12.39 ENCOUNTER FOR BREAST CANCER SCREENING USING NON-MAMMOGRAM MODALITY: ICD-10-CM

## 2023-10-25 DIAGNOSIS — Z30.09 UNWANTED FERTILITY: ICD-10-CM

## 2023-10-25 DIAGNOSIS — Z01.419 ENCOUNTER FOR GYNECOLOGICAL EXAMINATION WITHOUT ABNORMAL FINDING: Primary | ICD-10-CM

## 2023-10-25 DIAGNOSIS — Z23 NEED FOR HPV VACCINATION: ICD-10-CM

## 2023-10-25 DIAGNOSIS — Z11.51 SCREENING FOR HPV (HUMAN PAPILLOMAVIRUS): ICD-10-CM

## 2023-10-25 DIAGNOSIS — Z12.4 SCREENING FOR CERVICAL CANCER: ICD-10-CM

## 2023-10-25 PROBLEM — Z76.89 ENCOUNTER FOR SUPPORT AND COORDINATION OF TRANSITION OF CARE: Status: RESOLVED | Noted: 2021-06-17 | Resolved: 2023-10-25

## 2023-10-25 PROBLEM — N20.1 CALCULUS OF URETER: Status: RESOLVED | Noted: 2022-09-10 | Resolved: 2023-10-25

## 2023-10-25 PROCEDURE — G0476 HPV COMBO ASSAY CA SCREEN: HCPCS | Performed by: NURSE PRACTITIONER

## 2023-10-25 PROCEDURE — G0145 SCR C/V CYTO,THINLAYER,RESCR: HCPCS | Performed by: NURSE PRACTITIONER

## 2023-10-25 RX ORDER — NORETHINDRONE AND ETHINYL ESTRADIOL 0.5-0.035
1 KIT ORAL DAILY
Qty: 28 TABLET | Refills: 3 | Status: SHIPPED | OUTPATIENT
Start: 2023-10-25

## 2023-10-25 NOTE — PROGRESS NOTES
Patient given 1st HPV on right deltoid on 10/25/23    NDC# 3535-8213-32  LOT# [id number]  EXP# [id number]

## 2023-10-25 NOTE — PROGRESS NOTES
[address] [facility] Charly Sue is a 35 y.o. female who presents today for annual GYN exam.  Her last pap smear was performed 2020 and result was NILM. She reports no history of abnormal pap smears in her past.  She reports menses as irregular. Patient's last menstrual period was 10/07/2023 (exact date). Her general medical history has been reviewed and she reports it as follows:    Past Medical History:   Diagnosis Date    Hyperlipidemia     Kidney stone      Past Surgical History:   Procedure Laterality Date    NO PAST SURGERIES       OB History          2    Para   2    Term   2       0    AB   0    Living   2         SAB   0    IAB   0    Ectopic   0    Multiple   0    Live Births   2               Social History     Tobacco Use    Smoking status: Never    Smokeless tobacco: Never   Vaping Use    Vaping Use: Never used   Substance Use Topics    Alcohol use: Yes    Drug use: Never     Social History     Substance and Sexual Activity   Sexual Activity Yes    Partners: Male    Birth control/protection: None     Cancer-related family history is negative for Cancer, Breast cancer, Colon cancer, and Ovarian cancer. No current outpatient medications      Review of Systems:  Review of Systems   Constitutional: Negative. Gastrointestinal: Negative. Genitourinary:  Negative for difficulty urinating, menstrual problem, pelvic pain and vaginal discharge. Skin: Negative. Physical Exam:  /68   Pulse 87   Ht 5' 4" (1.626 m)   Wt 76.2 kg (168 lb)   LMP 10/07/2023 (Exact Date)   BMI 28.84 kg/m²   Physical Exam  Constitutional:       General: She is not in acute distress. Appearance: She is well-developed. Genitourinary:      Vulva normal.      No lesions in the vagina. Right Adnexa: not tender and no mass present. Left Adnexa: not tender and no mass present. Cervical friability present.       No cervical motion tenderness or lesion. Uterus is not tender. Breasts:     Right: No mass, nipple discharge, skin change or tenderness. Left: No mass, nipple discharge, skin change or tenderness. Neck:      Thyroid: No thyromegaly. Cardiovascular:      Rate and Rhythm: Normal rate and regular rhythm. Pulmonary:      Effort: Pulmonary effort is normal.   Abdominal:      Palpations: Abdomen is soft. Tenderness: There is no abdominal tenderness. Musculoskeletal:      Cervical back: Neck supple. Neurological:      Mental Status: She is alert and oriented to person, place, and time. Skin:     General: Skin is warm and dry. Vitals reviewed. Assessment/Plan:   1. Normal well-woman GYN exam.  2. Cervical cancer screening:  Normal cervical exam.  Pap smear done with HPV co-testing. Has not received HPV vaccine in the past, but desires to initiate series now. Given HPV vaccine today and will return in 2 and 6 months for subsequent vaccinations. 3. STD screening:  Patient declines. 4. Breast cancer screening:  Normal breast exam.  Reviewed breast self-awareness. 5. Depression Screening: Patient's depression screening was assessed with a PHQ-2 score of 1. Their PHQ-9 score was 3. Clinically patient does not have depression. No treatment is required. 6. BMI Counseling: Body mass index is 28.84 kg/m². No intervention indicated. 7. Contraception:  Stopped OCP's 1 month ago because she was not getting menses at all. Desires to resume OCP's but with a different brand as she desires to get her menses each month. Given Rx Necon 0.5/35 combination and instructed on appropriate administration. Return in 3 months for pill check. 8. Return to office in 1 year for annual GYN exam.    Reviewed with patient that test results are available in [address] [place] [address] immediately, but that they will not necessarily be reviewed by me immediately.   Explained that I will review results at my earliest opportunity and contact patient appropriately.

## 2023-10-25 NOTE — LETTER
2023    To Jay Navarrete  : 1990      Esta carta es para informarle que kana resultados recientes de la prueba de Papanicolaou fueron revisados por  y son Hannah Sullivan.   Nos vemos en 1 año para patel [address] [place] [address]

## 2023-10-25 NOTE — PATIENT INSTRUCTIONS
Rene por patel confianza en nuestro equipo. Den Bass y agradecemos kana comentarios. Si recibe rajiv encuesta nuestra, tómese unos momentos para informarnos cómo estamos.    Sinceramente,  [address]

## 2023-10-26 LAB
HPV HR 12 DNA CVX QL NAA+PROBE: NEGATIVE
HPV16 DNA CVX QL NAA+PROBE: NEGATIVE
HPV18 DNA CVX QL NAA+PROBE: NEGATIVE

## 2023-11-02 LAB
LAB AP GYN PRIMARY INTERPRETATION: NORMAL
Lab: NORMAL

## 2023-12-27 ENCOUNTER — CLINICAL SUPPORT (OUTPATIENT)
Dept: OBGYN CLINIC | Facility: CLINIC | Age: 33
End: 2023-12-27

## 2023-12-27 VITALS
SYSTOLIC BLOOD PRESSURE: 106 MMHG | WEIGHT: 162 LBS | DIASTOLIC BLOOD PRESSURE: 74 MMHG | BODY MASS INDEX: 27.81 KG/M2 | HEART RATE: 86 BPM

## 2023-12-27 DIAGNOSIS — Z23 NEED FOR HPV VACCINATION: Primary | ICD-10-CM

## 2023-12-27 PROCEDURE — 90651 9VHPV VACCINE 2/3 DOSE IM: CPT

## 2023-12-27 PROCEDURE — 90471 IMMUNIZATION ADMIN: CPT

## 2023-12-27 RX ORDER — TOPIRAMATE 25 MG/1
25 TABLET ORAL 2 TIMES DAILY
COMMUNITY

## 2023-12-27 RX ORDER — PHENTERMINE HYDROCHLORIDE 37.5 MG/1
37.5 TABLET ORAL
COMMUNITY

## 2023-12-27 NOTE — PROGRESS NOTES
2nd HPV vaccine administered to pt in right deltoid on 12/27/2023.    NDC: 8450-4985-04  EXP: 01/23/2026  LOT: [id number]

## 2023-12-28 PROBLEM — M54.9 BACK PAIN: Status: ACTIVE | Noted: 2023-12-28

## 2023-12-29 ENCOUNTER — OFFICE VISIT (OUTPATIENT)
Dept: FAMILY MEDICINE CLINIC | Facility: CLINIC | Age: 33
End: 2023-12-29

## 2023-12-29 VITALS
HEART RATE: 94 BPM | RESPIRATION RATE: 18 BRPM | TEMPERATURE: 98.4 F | HEIGHT: 64 IN | WEIGHT: 161.8 LBS | SYSTOLIC BLOOD PRESSURE: 114 MMHG | BODY MASS INDEX: 27.62 KG/M2 | DIASTOLIC BLOOD PRESSURE: 78 MMHG | OXYGEN SATURATION: 99 %

## 2023-12-29 DIAGNOSIS — Z13.220 SCREENING FOR HYPERLIPIDEMIA: ICD-10-CM

## 2023-12-29 DIAGNOSIS — R73.09 ELEVATED RANDOM BLOOD GLUCOSE LEVEL: ICD-10-CM

## 2023-12-29 DIAGNOSIS — M94.0 ACUTE COSTOCHONDRITIS: ICD-10-CM

## 2023-12-29 DIAGNOSIS — M54.6 LEFT-SIDED THORACIC BACK PAIN, UNSPECIFIED CHRONICITY: Primary | ICD-10-CM

## 2023-12-29 PROBLEM — R73.9 ELEVATED RANDOM BLOOD GLUCOSE LEVEL: Status: ACTIVE | Noted: 2023-12-29

## 2023-12-29 PROCEDURE — 99213 OFFICE O/P EST LOW 20 MIN: CPT | Performed by: FAMILY MEDICINE

## 2023-12-29 RX ORDER — CYCLOBENZAPRINE HCL 10 MG
10 TABLET ORAL
Qty: 30 TABLET | Refills: 0 | Status: SHIPPED | OUTPATIENT
Start: 2023-12-29

## 2023-12-29 RX ORDER — SENNOSIDES 8.6 MG
650 CAPSULE ORAL 4 TIMES DAILY PRN
Qty: 30 TABLET | Refills: 0 | Status: SHIPPED | OUTPATIENT
Start: 2023-12-29

## 2023-12-29 RX ORDER — NAPROXEN 500 MG/1
500 TABLET ORAL 2 TIMES DAILY WITH MEALS
Qty: 60 TABLET | Refills: 5 | Status: SHIPPED | OUTPATIENT
Start: 2023-12-29

## 2023-12-29 RX ORDER — CYCLOBENZAPRINE HCL 10 MG
10 TABLET ORAL 3 TIMES DAILY PRN
Qty: 30 TABLET | Refills: 0 | Status: SHIPPED | OUTPATIENT
Start: 2023-12-29 | End: 2023-12-29

## 2023-12-29 RX ORDER — LIDOCAINE 50 MG/G
OINTMENT TOPICAL 3 TIMES DAILY PRN
Qty: 50 G | Refills: 2 | Status: SHIPPED | OUTPATIENT
Start: 2023-12-29

## 2023-12-29 NOTE — PROGRESS NOTES
Name: Etta Barriga      : 1990      MRN: [id number]  Encounter Provider: Elias Schoen, MD  Encounter Date: 2023   Encounter department: [facility]    Assessment & Plan     1. Left-sided thoracic back pain, unspecified chronicity  Assessment & Plan:  Consistent with costochondritis from persistent cough, less likely kidney stones or intra thoracic etiology      2. Acute costochondritis  Assessment & Plan:  Current symptoms consistent with costochondritis along with muscle spasms associated with joint inflammation.  - As needed medications as below  - Naproxen scheduled for 10 days to bring down inflammation  - Follow-up in 2 weeks if symptoms have not resolved or greatly improved    Orders:  -     naproxen (Naprosyn) 500 mg tablet; Take 1 tablet (500 mg total) by mouth 2 (two) times a day with meals Take scheduled for 10 days, then PRN going forward  -     lidocaine (XYLOCAINE) 5 % ointment; Apply topically 3 (three) times a day as needed for mild pain  -     acetaminophen (TYLENOL) 650 mg CR tablet; Take 1 tablet (650 mg total) by mouth 4 (four) times a day as needed for mild pain  -     cyclobenzaprine (FLEXERIL) 10 mg tablet; Take 1 tablet (10 mg total) by mouth 3 (three) times a day as needed for muscle spasms    3. Elevated random blood glucose level  -     Hemoglobin A1C; Future    4. Screening for hyperlipidemia  -     Lipid panel; Future           Subjective      Patient is a 33-year-old female here for persistent cough with rib pain.    Patient reports she had a chronic cough for a while that started back in October of this year.  She was coughing frequently and started having back pain and rib pain that radiated to her chest.  She now states that the cough has resolved but the rib pain has gotten worse and extends further into her right chest.  She notes some improvement with oral over-the-counter pain relievers.  She notes pain with movement,  "sleeping or any other activity that requires arm movements.      Review of Systems   Constitutional:  Negative for chills and fever.   HENT:  Negative for sore throat.    Respiratory:  Negative for shortness of breath.    Cardiovascular:  Positive for chest pain. Negative for palpitations.   Gastrointestinal:  Negative for abdominal pain.   Genitourinary:  Negative for dysuria.   Musculoskeletal:  Negative for myalgias.   Neurological:  Negative for weakness.       Current Outpatient Medications on File Prior to Visit   Medication Sig    norethindrone-ethinyl estradiol (Necon 0.5/35, 28,) 0.5-35 MG-MCG per tablet Take 1 tablet by mouth daily    phentermine (ADIPEX-P) 37.5 MG tablet Take 37.5 mg by mouth every morning before breakfast    topiramate (TOPAMAX) 25 mg tablet Take 25 mg by mouth 2 (two) times a day       Objective     /78 (BP Location: Left arm, Patient Position: Sitting, Cuff Size: Standard)   Pulse 94   Temp 98.4 °F (36.9 °C) (Temporal)   Resp 18   Ht 5' 4\" (1.626 m)   Wt 73.4 kg (161 lb 12.8 oz)   LMP  (LMP Unknown) Comment: patient is on birth control pills, hasn't had a regular menstrual  SpO2 99%   Breastfeeding No   BMI 27.77 kg/m²     Physical Exam  Constitutional:       Appearance: Normal appearance.   Eyes:      Extraocular Movements: Extraocular movements intact.      Pupils: Pupils are equal, round, and reactive to light.   Cardiovascular:      Rate and Rhythm: Normal rate.   Pulmonary:      Effort: Pulmonary effort is normal.   Musculoskeletal:         General: Normal range of motion.      Cervical back: Normal range of motion.   Skin:     General: Skin is warm.   Neurological:      General: No focal deficit present.      Mental Status: She is alert.   Psychiatric:         Mood and Affect: Mood normal.       Portions of the record were created with voice recognition software.  Occasional wrong word or \"sound a like\" substitutions may have occurred due to the inherent " limitations of voice recognition software.  Read the chart carefully and recognize, using context, where substitutions have occurred.      Elias Schoen, MD

## 2023-12-29 NOTE — ASSESSMENT & PLAN NOTE
Current symptoms consistent with costochondritis along with muscle spasms associated with joint inflammation.  - As needed medications as below  - Naproxen scheduled for 10 days to bring down inflammation  - Follow-up in 2 weeks if symptoms have not resolved or greatly improved

## 2023-12-29 NOTE — ASSESSMENT & PLAN NOTE
Consistent with costochondritis from persistent cough, less likely kidney stones or intra thoracic etiology

## 2024-02-11 DIAGNOSIS — Z30.09 UNWANTED FERTILITY: ICD-10-CM

## 2024-02-11 RX ORDER — NORETHINDRONE AND ETHINYL ESTRADIOL 0.5-0.035
1 KIT ORAL DAILY
Qty: 28 TABLET | Refills: 3 | Status: SHIPPED | OUTPATIENT
Start: 2024-02-11

## 2024-02-14 ENCOUNTER — HOSPITAL ENCOUNTER (EMERGENCY)
Facility: HOSPITAL | Age: 34
Discharge: HOME | End: 2024-02-14
Attending: EMERGENCY MEDICINE | Admitting: EMERGENCY MEDICINE

## 2024-02-14 ENCOUNTER — APPOINTMENT (EMERGENCY)
Dept: RADIOLOGY | Facility: HOSPITAL | Age: 34
End: 2024-02-14

## 2024-02-14 VITALS
OXYGEN SATURATION: 99 % | TEMPERATURE: 97.9 F | DIASTOLIC BLOOD PRESSURE: 80 MMHG | WEIGHT: 152.7 LBS | BODY MASS INDEX: 26.21 KG/M2 | RESPIRATION RATE: 16 BRPM | SYSTOLIC BLOOD PRESSURE: 129 MMHG | HEART RATE: 104 BPM
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
## 2024-02-14 DIAGNOSIS — S79.911A INJURY OF RIGHT HIP, INITIAL ENCOUNTER: ICD-10-CM

## 2024-02-14 DIAGNOSIS — M54.9 BACK PAIN: ICD-10-CM

## 2024-02-14 DIAGNOSIS — S49.91XA INJURY OF RIGHT SHOULDER, INITIAL ENCOUNTER: ICD-10-CM

## 2024-02-14 LAB
EXT PREGNANCY TEST URINE: NEGATIVE
EXT. CONTROL: NORMAL

## 2024-02-14 PROCEDURE — 81025 URINE PREGNANCY TEST: CPT

## 2024-02-14 PROCEDURE — 96372 THER/PROPH/DIAG INJ SC/IM: CPT

## 2024-02-14 PROCEDURE — 73030 X-RAY EXAM OF SHOULDER: CPT

## 2024-02-14 PROCEDURE — 99284 EMERGENCY DEPT VISIT MOD MDM: CPT

## 2024-02-14 PROCEDURE — 73502 X-RAY EXAM HIP UNI 2-3 VIEWS: CPT

## 2024-02-14 PROCEDURE — 72072 X-RAY EXAM THORAC SPINE 3VWS: CPT

## 2024-02-14 RX ORDER — LIDOCAINE 50 MG/G
1 PATCH TOPICAL EVERY 24 HOURS
Qty: 15 PATCH | Refills: 0 | Status: SHIPPED | OUTPATIENT
Start: 2024-02-14 | End: 2024-02-29

## 2024-02-14 RX ORDER — KETOROLAC TROMETHAMINE 30 MG/ML
15 INJECTION, SOLUTION INTRAMUSCULAR; INTRAVENOUS ONCE
Status: COMPLETED | OUTPATIENT
Start: 2024-02-14 | End: 2024-02-14

## 2024-02-14 RX ORDER — LIDOCAINE 50 MG/G
2 PATCH TOPICAL ONCE
Status: DISCONTINUED | OUTPATIENT
Start: 2024-02-14 | End: 2024-02-14 | Stop reason: HOSPADM

## 2024-02-14 RX ORDER — METHOCARBAMOL 500 MG/1
500 TABLET, FILM COATED ORAL 2 TIMES DAILY
Qty: 20 TABLET | Refills: 0 | Status: SHIPPED | OUTPATIENT
Start: 2024-02-14

## 2024-02-14 RX ORDER — NAPROXEN 500 MG/1
500 TABLET ORAL 2 TIMES DAILY WITH MEALS
Qty: 10 TABLET | Refills: 0 | Status: SHIPPED | OUTPATIENT
Start: 2024-02-14 | End: 2025-02-13

## 2024-02-14 RX ADMIN — LIDOCAINE 2 PATCH: 50 PATCH CUTANEOUS at 12:33

## 2024-02-14 RX ADMIN — KETOROLAC TROMETHAMINE 15 MG: 30 INJECTION, SOLUTION INTRAMUSCULAR; INTRAVENOUS at 12:33

## 2024-02-14 NOTE — Clinical Note
Etta Barriga was seen and treated in our emergency department on 2/14/2024.                Diagnosis:     Etta  .    She may return on this date: 02/17/2024         If you have any questions or concerns, please don't hesitate to call.      Melly Brandon PA-C    ______________________________           _______________          _______________  Hospital Representative                              Date                                Time

## 2024-02-14 NOTE — ED PROVIDER NOTES
History  Chief Complaint   Patient presents with    Arm Pain     Arm pain and R hip pain after a fall on ice     33-year-old female presents to emergency department complaining of right hip pain, right shoulder pain status post fall just prior to arrival. she reports prior she was leaving her home when she slipped on icy concrete step at porch, fell back onto R side onto the step. No head strike, no loc. Witnessed by . Was able to ambulate without difficulty.      Fall  Arrived directly from scene: yes    Suspicion of alcohol use: no    Suspicion of drug use: no    Prior to arrival data:     Patient ambulatory at scene: yes      Loss of consciousness: no      Amnesic to event: no    Associated symptoms: back pain (R shoulder into shoulder blade/thoracic spine)    Associated symptoms: no abdominal pain, no blindness, no chest pain, no difficulty breathing, no headaches, no hearing loss, no loss of consciousness, no nausea, no neck pain, no seizures and no vomiting    Associated symptoms comment:  Denies numbness, weakness, urinary incontinence, fecal incontinence, saddle anesthesia, neck pain, vision change, erythema, decreased ROM, swelling.       Prior to Admission Medications   Prescriptions Last Dose Informant Patient Reported? Taking?   Triny 0.5-35 MG-MCG per tablet   No No   Sig: take 1 tablet by mouth once daily   acetaminophen (TYLENOL) 650 mg CR tablet   No No   Sig: Take 1 tablet (650 mg total) by mouth 4 (four) times a day as needed for mild pain   cyclobenzaprine (FLEXERIL) 10 mg tablet   No No   Sig: Take 1 tablet (10 mg total) by mouth daily at bedtime   lidocaine (XYLOCAINE) 5 % ointment   No No   Sig: Apply topically 3 (three) times a day as needed for mild pain   naproxen (Naprosyn) 500 mg tablet   No No   Sig: Take 1 tablet (500 mg total) by mouth 2 (two) times a day with meals Take scheduled for 10 days, then PRN going forward   phentermine (ADIPEX-P) 37.5 MG tablet   Yes No   Sig: Take 37.5  mg by mouth every morning before breakfast   topiramate (TOPAMAX) 25 mg tablet   Yes No   Sig: Take 25 mg by mouth 2 (two) times a day      Facility-Administered Medications: None       Past Medical History:   Diagnosis Date    Hyperlipidemia     Kidney stone 2022       Past Surgical History:   Procedure Laterality Date    NO PAST SURGERIES         Family History   Problem Relation Age of Onset    No Known Problems Mother     No Known Problems Father     No Known Problems Daughter     Cancer Neg Hx     Breast cancer Neg Hx     Colon cancer Neg Hx     Ovarian cancer Neg Hx      I have reviewed and agree with the history as documented.    E-Cigarette/Vaping    E-Cigarette Use Never User      E-Cigarette/Vaping Substances     Social History     Tobacco Use    Smoking status: Never    Smokeless tobacco: Never   Vaping Use    Vaping status: Never Used   Substance Use Topics    Alcohol use: Yes     Comment: couple times/month    Drug use: Never       Review of Systems   Constitutional:  Negative for chills and fever.   HENT:  Negative for hearing loss.    Eyes:  Negative for blindness and visual disturbance.   Respiratory:  Negative for shortness of breath.    Cardiovascular:  Negative for chest pain.   Gastrointestinal:  Negative for abdominal pain, nausea and vomiting.   Genitourinary:  Negative for decreased urine volume and hematuria.   Musculoskeletal:  Positive for back pain (R shoulder into shoulder blade/thoracic spine). Negative for gait problem, joint swelling and neck pain.   Skin:  Negative for color change, rash and wound.   Neurological:  Negative for dizziness, seizures, loss of consciousness, syncope, weakness and headaches.   Psychiatric/Behavioral:  Negative for confusion.    All other systems reviewed and are negative.      Physical Exam  Physical Exam  Vitals and nursing note reviewed.   Constitutional:       General: She is awake. She is not in acute distress.     Appearance: Normal appearance. She is  not ill-appearing.   HENT:      Head: Normocephalic and atraumatic.      Mouth/Throat:      Lips: Pink.      Mouth: Mucous membranes are moist.   Eyes:      General: Vision grossly intact.      Extraocular Movements: Extraocular movements intact.      Pupils: Pupils are equal, round, and reactive to light.   Cardiovascular:      Rate and Rhythm: Normal rate and regular rhythm.      Pulses:           Radial pulses are 2+ on the right side and 2+ on the left side.        Dorsalis pedis pulses are 2+ on the right side and 2+ on the left side.      Heart sounds: Normal heart sounds.   Pulmonary:      Effort: Pulmonary effort is normal. No respiratory distress.      Breath sounds: Normal breath sounds.      Comments: No decreased air movement or adventitious lung sounds.   Abdominal:      General: There is no distension.      Palpations: Abdomen is soft.      Tenderness: There is no abdominal tenderness.   Musculoskeletal:         General: No swelling.      Right shoulder: Tenderness and bony tenderness present. No swelling, effusion or crepitus. Normal range of motion (pain).      Left shoulder: Normal.      Right elbow: Normal.      Right wrist: Normal.      Cervical back: Normal and neck supple.      Thoracic back: Tenderness present. No swelling or bony tenderness.      Lumbar back: Normal.      Right hip: Tenderness and bony tenderness present. No crepitus. Normal range of motion. Normal strength.      Left hip: Normal.      Right upper leg: Normal.   Skin:     General: Skin is warm and dry.      Capillary Refill: Capillary refill takes less than 2 seconds.      Findings: No bruising, erythema or rash.   Neurological:      General: No focal deficit present.      Mental Status: She is alert and oriented to person, place, and time.      GCS: GCS eye subscore is 4. GCS verbal subscore is 5. GCS motor subscore is 6.      Sensory: No sensory deficit.      Motor: No weakness.      Gait: Gait normal.   Psychiatric:          Mood and Affect: Mood normal.         Behavior: Behavior normal.         Vital Signs  ED Triage Vitals [02/14/24 1139]   Temperature Pulse Respirations Blood Pressure SpO2   97.9 °F (36.6 °C) 104 16 129/80 99 %      Temp Source Heart Rate Source Patient Position - Orthostatic VS BP Location FiO2 (%)   Tympanic Monitor Sitting Left arm --      Pain Score       --           Vitals:    02/14/24 1139   BP: 129/80   Pulse: 104   Patient Position - Orthostatic VS: Sitting         Visual Acuity      ED Medications  Medications   ketorolac (TORADOL) injection 15 mg (15 mg Intramuscular Given 2/14/24 1233)       Diagnostic Studies  Results Reviewed       Procedure Component Value Units Date/Time    POCT pregnancy, urine [[id number]]  (Normal) Resulted: 02/14/24 1230    Lab Status: Final result Updated: 02/14/24 1235     EXT Preg Test, Ur Negative     Control Valid                   XR shoulder 2+ views RIGHT   ED Interpretation by Melly Brandon PA-C (02/14 1336)   No acute fx or dislocation       Final Result by Anival Mayo MD (02/14 1543)      No acute osseous abnormality.         Workstation performed: [id number]         XR hip/pelv 2-3 vws right   ED Interpretation by Melly Brandon PA-C (02/14 1336)   No acute fx or dislocation       Final Result by Anival Mayo MD (02/14 1542)      No acute osseous abnormality.            Workstation performed: [id number]         XR spine thoracic 3 vw   ED Interpretation by Melly Brandon PA-C (02/14 1336)   No acute fx or traumatic misalignment       Final Result by Pérez Rashid MD (02/15 1001)      Mild thoracolumbar scoliosis. No acute findings      Workstation performed: [id number]                    Procedures  Procedures         ED Course  ED Course as of 02/15/24 1233   Wed Feb 14, 2024 1336 Imaging review done by myself and preliminary results were discussed with the patient and family members.  Discussion was had with patient and family members that this read  "is preliminary and therefore discrepancies between this read and the final read by the radiologist are possible.  Patient and family members were informed that any discrepancies found by would be relayed by phone call.        1337 L handed.                                             Medical Decision Making  Fall. No headstrike or loc. Neurovascularly intact. Ddx includes but not limited to fx, strain, sprain, contusion. Xrays without acute fracture/dislocation on wet reads. Plan for supportive care, outpatient follow up.     All imaging and/or lab testing discussed with patient, strict return to ED precautions discussed. Patient recommended to follow up promptly with appropriate outpatient provider. Patient and/or family members verbalizes understanding and agrees with plan. Patient and/or family members were given opportunity to ask questions, all questions were answered at this time. Patient is stable for discharge.     Portions of the record may have been created with voice recognition software. Occasional wrong word or \"sound a like\" substitutions may have occurred due to the inherent limitations of voice recognition software. Read the chart carefully and recognize, using context, where substitutions have occurred.       Amount and/or Complexity of Data Reviewed  Labs: ordered.  Radiology: ordered and independent interpretation performed.    Risk  Prescription drug management.             Disposition  Final diagnoses:   Fall, initial encounter   Back pain   Injury of right hip, initial encounter   Injury of right shoulder, initial encounter     Time reflects when diagnosis was documented in both MDM as applicable and the Disposition within this note       Time User Action Codes Description Comment    2/14/2024  1:36 PM Melly Brandon Add [W19.XXXA] Fall, initial encounter     2/14/2024  1:37 PM Melly Brandon [M54.9] Back pain     2/14/2024  1:37 PM Melly Brandon [S79.911A] Injury of right hip, initial " encounter     2/14/2024  1:37 PM Melly Brandon [S49.91XA] Injury of right shoulder, initial encounter           ED Disposition       ED Disposition   Discharge    Condition   Stable    Date/Time   Wed Feb 14, 2024  1:37 PM    Comment   Etta Barriga discharge to home/self care.                   Follow-up Information       Follow up With Specialties Details Why Contact Info Additional Information    [facility] Schedule an appointment as soon as possible for a visit  For follow up regarding your symptoms [address], [address]  [facility] [address] [phone] [facility], [address], [address], [place], [place], [address] [phone]    [facility] [place] [facility]  As needed [address]  [address]  [facility] [address] [phone] [facility] [place], [address], [address], [place], [place], [address] [phone]            Discharge Medication List as of 2/14/2024  1:41 PM        START taking these medications    Details   Diclofenac Sodium (VOLTAREN) 1 % Apply 2 g topically 4 (four) times a day, Starting Wed 2/14/2024, Normal      lidocaine (LIDODERM) 5 % Apply 1 patch topically over 12 hours every 24 hours for 15 days Remove & Discard patch within 12 hours or as directed by MD, Starting Wed 2/14/2024, Until u 2/29/2024, Normal      methocarbamol (ROBAXIN) 500 mg tablet Take 1 tablet (500 mg total) by mouth 2 (two) times a day, Starting Wed 2/14/2024, Normal      naproxen (EC NAPROSYN) 500 MG EC tablet Take 1 tablet (500 mg total) by mouth 2 (two) times a day with meals, Starting Wed 2/14/2024, Until Thu 2/13/2025, Normal           CONTINUE these medications which have NOT CHANGED    Details   acetaminophen (TYLENOL) 650 mg CR tablet Take 1 tablet (650 mg total) by mouth 4 (four) times a day  as needed for mild pain, Starting Fri 12/29/2023, Normal      cyclobenzaprine (FLEXERIL) 10 mg tablet Take 1 tablet (10 mg total) by mouth daily at bedtime, Starting Fri 12/29/2023, Normal      lidocaine (XYLOCAINE) 5 % ointment Apply topically 3 (three) times a day as needed for mild pain, Starting Fri 12/29/2023, Normal      naproxen (Naprosyn) 500 mg tablet Take 1 tablet (500 mg total) by mouth 2 (two) times a day with meals Take scheduled for 10 days, then PRN going forward, Starting Fri 12/29/2023, Normal      phentermine (ADIPEX-P) 37.5 MG tablet Take 37.5 mg by mouth every morning before breakfast, Historical Med      topiramate (TOPAMAX) 25 mg tablet Take 25 mg by mouth 2 (two) times a day, Historical Med      Triny 0.5-35 MG-MCG per tablet take 1 tablet by mouth once daily, Starting Sun 2/11/2024, Normal                 PDMP Review       None            ED Provider  Electronically Signed by             Melly Brandon PA-C  02/15/24 9718

## 2024-02-14 NOTE — DISCHARGE INSTRUCTIONS
Do not drive or operate heavy machinery while taking Robaxin.  Follow-up with PCP.  Follow-up with comprehensive [facility], orthopedics as needed.  Return to ED for new or worsening symptoms as discussed.

## 2024-02-15 ENCOUNTER — TELEPHONE (OUTPATIENT)
Dept: PHYSICAL THERAPY | Facility: OTHER | Age: 34
End: 2024-02-15

## 2024-02-15 NOTE — TELEPHONE ENCOUNTER
Patient called back into [facility]  after receiving a call from [facility] staff regarding her referral.    Spoke with Etta, explained program and reason for the call.    Patient states she is interested but she would like to wait to see if medication will help her. Patient states he has no health insurance at the moment and she is planning on applying for one.    Patient will call back once she is ready for triage.    [facility] phone number and hours of business provided. Patient thanked me and I wished her a good day.    Will close referral per protocol.

## 2024-02-15 NOTE — TELEPHONE ENCOUNTER
Call placed to the patient per [facility] referral.    Voice message left for patient to call back. Phone number and hours of business provided.     This is the 1st attempt to reach the patient.  Will defer per protocol.    I.S#[id number] Mik

## 2024-02-21 PROBLEM — Z13.220 SCREENING FOR HYPERLIPIDEMIA: Status: RESOLVED | Noted: 2021-06-17 | Resolved: 2024-02-21

## 2024-02-28 ENCOUNTER — OFFICE VISIT (OUTPATIENT)
Dept: OBGYN CLINIC | Facility: CLINIC | Age: 34
End: 2024-02-28

## 2024-02-28 VITALS
BODY MASS INDEX: 25.54 KG/M2 | SYSTOLIC BLOOD PRESSURE: 114 MMHG | DIASTOLIC BLOOD PRESSURE: 63 MMHG | WEIGHT: 148.8 LBS | HEART RATE: 89 BPM

## 2024-02-28 DIAGNOSIS — Z30.09 UNWANTED FERTILITY: ICD-10-CM

## 2024-02-28 PROCEDURE — 99213 OFFICE O/P EST LOW 20 MIN: CPT | Performed by: NURSE PRACTITIONER

## 2024-02-28 RX ORDER — NORETHINDRONE AND ETHINYL ESTRADIOL 0.5-0.035
1 KIT ORAL DAILY
Qty: 28 TABLET | Refills: 8 | Status: SHIPPED | OUTPATIENT
Start: 2024-02-28

## 2024-02-28 NOTE — PATIENT INSTRUCTIONS
Rene por patel confianza en nuestro equipo.   Le agradecemos y agradecemos kana comentarios.   Si recibe rajiv encuesta nuestra, tómese unos momentos para informarnos cómo estamos.   Sinceramente,  CESAR Vásquez

## 2024-02-28 NOTE — PROGRESS NOTES
Etta LOU Ignacio Nithya presents today for OCP surveillance visit.  She reports doing well with OCP's and menses are regular, short, and light.  She denies issues remembering to take daily pill and desires to continue with OCP's.    /63 (BP Location: Right arm, Patient Position: Sitting, Cuff Size: Large)   Pulse 89   Wt 67.5 kg (148 lb 12.8 oz)   LMP 02/27/2024 (Exact Date)   BMI 25.54 kg/m²     Given Rx refills.  Return as previously scheduled for annual GYN exam in 10/2024.

## 2024-03-19 ENCOUNTER — TELEPHONE (OUTPATIENT)
Dept: FAMILY MEDICINE CLINIC | Facility: CLINIC | Age: 34
End: 2024-03-19

## 2024-04-10 ENCOUNTER — OFFICE VISIT (OUTPATIENT)
Dept: FAMILY MEDICINE CLINIC | Facility: CLINIC | Age: 34
End: 2024-04-10

## 2024-04-10 VITALS
HEIGHT: 66 IN | HEART RATE: 101 BPM | TEMPERATURE: 96.5 F | DIASTOLIC BLOOD PRESSURE: 77 MMHG | BODY MASS INDEX: 23.21 KG/M2 | OXYGEN SATURATION: 99 % | RESPIRATION RATE: 18 BRPM | SYSTOLIC BLOOD PRESSURE: 107 MMHG | WEIGHT: 144.4 LBS

## 2024-04-10 DIAGNOSIS — Z02.89 EXAMINATION, PHYSICAL, EMPLOYEE: Primary | ICD-10-CM

## 2024-04-10 PROBLEM — M94.0 ACUTE COSTOCHONDRITIS: Status: RESOLVED | Noted: 2023-12-29 | Resolved: 2024-04-10

## 2024-04-10 PROBLEM — M54.9 BACK PAIN: Status: RESOLVED | Noted: 2023-12-28 | Resolved: 2024-04-10

## 2024-04-10 NOTE — PROGRESS NOTES
ADULT ANNUAL PHYSICAL  [facility]    NAME: Etta Barriga  AGE: 33 y.o. SEX: female  : 1990     DATE: 4/10/2024     Assessment and Plan:     Problem List Items Addressed This Visit       Examination, physical, employee - Primary     Form completed, MMR, TB, and Hep B status area left blank            Immunizations and preventive care screenings were discussed with patient today. Appropriate education was printed on patient's after visit summary.    Counseling:  Alcohol/drug use: discussed moderation in alcohol intake, the recommendations for healthy alcohol use, and avoidance of illicit drug use.  Dental Health: discussed importance of regular tooth brushing, flossing, and dental visits.  Injury prevention: discussed safety/seat belts, safety helmets, smoke detectors, carbon dioxide detectors, and smoking near bedding or upholstery.  Sexual health: discussed sexually transmitted diseases, partner selection, use of condoms, avoidance of unintended pregnancy, and contraceptive alternatives.  Exercise: the importance of regular exercise/physical activity was discussed. Recommend exercise 3-5 times per week for at least 30 minutes.          No follow-ups on file.     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 The patient reports no problems. She reports that her employer does not require any blood work or testing. She showed me 's physical form and it appears as though the area for MMR, TB, and Hep B testing was left blank and she reports that  was able to start working with no issues.    Diet and Physical Activity  Diet/Nutrition: well balanced diet and consuming 3-5 servings of fruits/vegetables daily.   Exercise: moderate  cardiovascular exercise.      Depression Screening  PHQ-2/9 Depression Screening           General Health  Sleep: sleeps well.   Hearing:  no concerns .  Vision: goes for regular eye exams and wears glasses.   Dental: regular dental visits and brushes teeth twice daily.       /GYN Health  Follows with gynecology? yes   Last menstrual period: n/a  Contraceptive method: IUD placement.  History of STDs?: no.     Advanced Care Planning  Do you have an advanced directive? no  Do you have a durable medical power of ? no  ACP document given to the patient? no      Review of Systems:     Review of Systems   Constitutional:  Negative for chills and fever.   HENT:  Negative for ear pain and sore throat.    Eyes:  Negative for pain and visual disturbance.   Respiratory:  Negative for cough and shortness of breath.    Cardiovascular:  Negative for chest pain and palpitations.   Gastrointestinal:  Negative for abdominal pain and vomiting.   Genitourinary:  Negative for dysuria and hematuria.   Musculoskeletal:  Negative for arthralgias and back pain.   Skin:  Negative for color change and rash.   Neurological:  Negative for seizures and syncope.   All other systems reviewed and are negative.     Past Medical History:     Past Medical History:   Diagnosis Date    Back pain 12/28/2023    Hyperlipidemia     Kidney stone 2022      Past Surgical History:     Past Surgical History:   Procedure Laterality Date    NO PAST SURGERIES        Social History:     Social History     Socioeconomic History    Marital status: /Civil Union     Spouse name: None    Number of children: None    Years of education: None    Highest education level: None   Occupational History    None   Tobacco Use    Smoking status: Never    Smokeless tobacco: Never   Vaping Use    Vaping status: Never Used   Substance and Sexual Activity    Alcohol use: Yes     Comment: couple times/month    Drug use: Never    Sexual activity: Yes     Partners: Male      Birth control/protection: None   Other Topics Concern    None   Social History Narrative    None     Social Determinants of Health     Financial Resource Strain: Low Risk  (2/28/2024)    Overall Financial Resource Strain (CARDIA)     Difficulty of Paying Living Expenses: Not very hard   Recent Concern: Financial Resource Strain - High Risk (12/27/2023)    Overall Financial Resource Strain (CARDIA)     Difficulty of Paying Living Expenses: Hard   Food Insecurity: No Food Insecurity (2/28/2024)    Hunger Vital Sign     Worried About Running Out of Food in the Last Year: Never true     Ran Out of Food in the Last Year: Never true   Recent Concern: Food Insecurity - Food Insecurity Present (12/27/2023)    Hunger Vital Sign     Worried About Running Out of Food in the Last Year: Sometimes true     Ran Out of Food in the Last Year: Never true   Transportation Needs: No Transportation Needs (2/28/2024)    PRAPARE - Transportation     Lack of Transportation (Medical): No     Lack of Transportation (Non-Medical): No   Physical Activity: Not on file   Stress: Not on file   Social Connections: Not on file   Intimate Partner Violence: Not on file   Housing Stability: Unknown (4/10/2024)    Housing Stability Vital Sign     Unable to Pay for Housing in the Last Year: No     Number of Places Lived in the Last Year: Not on file     Unstable Housing in the Last Year: No      Family History:     Family History   Problem Relation Age of Onset    No Known Problems Mother     No Known Problems Father     No Known Problems Daughter     Cancer Neg Hx     Breast cancer Neg Hx     Colon cancer Neg Hx     Ovarian cancer Neg Hx       Current Medications:     Current Outpatient Medications   Medication Sig Dispense Refill    norethindrone-ethinyl estradiol (Triny) 0.5-35 MG-MCG per tablet Take 1 tablet by mouth daily 28 tablet 8    phentermine (ADIPEX-P) 37.5 MG tablet Take 37.5 mg by mouth every morning before breakfast      topiramate  "(TOPAMAX) 25 mg tablet Take 25 mg by mouth 2 (two) times a day      acetaminophen (TYLENOL) 650 mg CR tablet Take 1 tablet (650 mg total) by mouth 4 (four) times a day as needed for mild pain 30 tablet 0     No current facility-administered medications for this visit.      Allergies:     No Known Allergies   Physical Exam:     /77 (BP Location: Left arm, Patient Position: Sitting, Cuff Size: Standard)   Pulse 101   Temp (!) 96.5 °F (35.8 °C) (Temporal)   Resp 18   Ht 5' 6\" (1.676 m)   Wt 65.5 kg (144 lb 6.4 oz)   SpO2 99%   BMI 23.31 kg/m²     Physical Exam  Vitals and nursing note reviewed.   Constitutional:       General: She is not in acute distress.     Appearance: She is well-developed.   HENT:      Head: Normocephalic and atraumatic.   Eyes:      Conjunctiva/sclera: Conjunctivae normal.   Cardiovascular:      Rate and Rhythm: Normal rate and regular rhythm.      Heart sounds: No murmur heard.  Pulmonary:      Effort: Pulmonary effort is normal. No respiratory distress.      Breath sounds: Normal breath sounds.   Abdominal:      Palpations: Abdomen is soft.      Tenderness: There is no abdominal tenderness.   Musculoskeletal:         General: No swelling.      Cervical back: Neck supple.   Skin:     General: Skin is warm and dry.      Capillary Refill: Capillary refill takes less than 2 seconds.   Neurological:      Mental Status: She is alert.   Psychiatric:         Mood and Affect: Mood normal.          CESAR Siddiqui   [facility]    "

## 2024-04-11 ENCOUNTER — TELEPHONE (OUTPATIENT)
Dept: FAMILY MEDICINE CLINIC | Facility: CLINIC | Age: 34
End: 2024-04-11

## 2024-04-11 DIAGNOSIS — Z11.1 SCREENING-PULMONARY TB: ICD-10-CM

## 2024-04-11 DIAGNOSIS — Z02.89 PHYSICAL EXAMINATION OF EMPLOYEE: Primary | ICD-10-CM

## 2024-04-11 DIAGNOSIS — Z78.9 MEASLES, MUMPS, RUBELLA (MMR) VACCINATION STATUS UNKNOWN: ICD-10-CM

## 2024-04-11 NOTE — TELEPHONE ENCOUNTER
Pt  call asking for labs order to be put in for pt that she needs for her work. Pt had physical done yesterday and needs   Karen Alvarado  Drug screen   Quantiferon GOLD ORDER.  PLEASE.

## 2024-04-12 ENCOUNTER — APPOINTMENT (OUTPATIENT)
Dept: LAB | Age: 34
End: 2024-04-12
Payer: COMMERCIAL

## 2024-04-12 DIAGNOSIS — Z02.89 PHYSICAL EXAMINATION OF EMPLOYEE: ICD-10-CM

## 2024-04-12 DIAGNOSIS — Z78.9 MEASLES, MUMPS, RUBELLA (MMR) VACCINATION STATUS UNKNOWN: ICD-10-CM

## 2024-04-12 DIAGNOSIS — Z11.1 SCREENING-PULMONARY TB: ICD-10-CM

## 2024-04-12 PROCEDURE — 86765 RUBEOLA ANTIBODY: CPT

## 2024-04-12 PROCEDURE — 86480 TB TEST CELL IMMUN MEASURE: CPT

## 2024-04-12 PROCEDURE — 86762 RUBELLA ANTIBODY: CPT

## 2024-04-12 PROCEDURE — 86735 MUMPS ANTIBODY: CPT

## 2024-04-13 LAB
GAMMA INTERFERON BACKGROUND BLD IA-ACNC: 0.05 IU/ML
M TB IFN-G BLD-IMP: NEGATIVE
M TB IFN-G CD4+ BCKGRND COR BLD-ACNC: 0.06 IU/ML
M TB IFN-G CD4+ BCKGRND COR BLD-ACNC: 0.07 IU/ML
MEV IGG SER IA-ACNC: 117 AU/ML
MITOGEN IGNF BCKGRD COR BLD-ACNC: 9.95 IU/ML
MUV IGG SER IA-ACNC: 175 AU/ML
RUBV IGG SERPL IA-ACNC: 22.9 INDEX

## 2024-04-17 ENCOUNTER — TELEPHONE (OUTPATIENT)
Dept: FAMILY MEDICINE CLINIC | Facility: CLINIC | Age: 34
End: 2024-04-17

## 2024-04-17 NOTE — TELEPHONE ENCOUNTER
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 Pt stating that she needed lab work done and need results to be written into form.    Pt stated that is ok to be added to already filled out form or copy of new form was provided.     Pt stating she need form as soon as possible for her to be able to get the job.    Copy made and placed in PCP folder.    Forms to be delivered to PCP mailbox at assigned time.    Please notify pt when form is ready for .

## 2024-04-18 NOTE — TELEPHONE ENCOUNTER
[facility]18/24 [phone]. FAX CONFIRMATION RECEIVED.         Place in pickup bin under (G)

## 2024-04-21 PROBLEM — E78.00 PURE HYPERCHOLESTEROLEMIA: Status: ACTIVE | Noted: 2024-04-21

## 2024-04-30 ENCOUNTER — CLINICAL SUPPORT (OUTPATIENT)
Dept: OBGYN CLINIC | Facility: CLINIC | Age: 34
End: 2024-04-30

## 2024-04-30 VITALS
BODY MASS INDEX: 23.08 KG/M2 | DIASTOLIC BLOOD PRESSURE: 72 MMHG | HEIGHT: 66 IN | SYSTOLIC BLOOD PRESSURE: 111 MMHG | WEIGHT: 143.6 LBS | HEART RATE: 96 BPM

## 2024-04-30 DIAGNOSIS — Z23 NEED FOR HPV VACCINATION: Primary | ICD-10-CM

## 2024-04-30 PROCEDURE — 90471 IMMUNIZATION ADMIN: CPT

## 2024-04-30 PROCEDURE — 90651 9VHPV VACCINE 2/3 DOSE IM: CPT

## 2024-04-30 NOTE — PROGRESS NOTES
HPV given to patient in left deltoid on 4/30/2024.    NDC: 6311-1527-94  LOT: [id number]  EXP: 05/31/2026

## 2024-05-01 ENCOUNTER — TELEPHONE (OUTPATIENT)
Dept: OBGYN CLINIC | Facility: CLINIC | Age: 34
End: 2024-05-01

## 2024-05-10 PROBLEM — Z02.89 EXAMINATION, PHYSICAL, EMPLOYEE: Status: RESOLVED | Noted: 2021-06-17 | Resolved: 2024-05-10

## 2024-05-23 ENCOUNTER — ULTRASOUND (OUTPATIENT)
Dept: OBGYN CLINIC | Facility: CLINIC | Age: 34
End: 2024-05-23

## 2024-05-23 VITALS
SYSTOLIC BLOOD PRESSURE: 109 MMHG | DIASTOLIC BLOOD PRESSURE: 72 MMHG | HEART RATE: 88 BPM | BODY MASS INDEX: 23.82 KG/M2 | WEIGHT: 147.6 LBS

## 2024-05-23 DIAGNOSIS — N91.2 AMENORRHEA: ICD-10-CM

## 2024-05-23 DIAGNOSIS — Z3A.08 8 WEEKS GESTATION OF PREGNANCY: Primary | ICD-10-CM

## 2024-05-23 LAB — SL AMB POCT URINE HCG: POSITIVE

## 2024-05-23 NOTE — PROGRESS NOTES
[facility]  Etta Barriga  [id number]  1990      Viability Scan    S: 34 y.o.y.o.  who presents for viability scan with LMP of 3/15/24. She is 9 weeks and 6 days by her LMP. She says she does have irregular periods though. She was surprised to find out she was pregnant. She denies any regular cramping or vaginal bleeding. This is an unplanned but welcomed pregnancy. Her previous pregnancies were vaginal deliveries.      O:  Vitals:    24 1035   BP: 109/72   BP Location: Right arm   Patient Position: Sitting   Cuff Size: Standard   Pulse: 88   Weight: 67 kg (147 lb 9.6 oz)       TVUS: viable, estevez IUP measuring 8 weeks 6 days  FHT 170bpm.  CRL 2.18cm GA 8w6d  CRL 2.19cm GA 8w6d  CRL 2.27cm GA 9w0d                   A/P:  #1. IUP at 8 weeks and 6 days dated by ultrasound  - Viable pregnancy on TVUS  - RTC for nurse intake visit      Anthony Ferguson MD  OB/GYN PGY-2  2024  10:35 AM

## 2024-05-23 NOTE — LETTER
[facility] [address]  [phone]  Dept: [phone]    May 23, 2024     Patient: Etta Barriga   YOB: 1990   Date of Visit: 5/23/2024       To Whom it May Concern:    Etta Barriga is under my professional care. She is cleared to receive an MRI, and it is safe during pregnancy.    If you have any questions or concerns, please don't hesitate to call.         Sincerely,          Anthony Ferguson MD

## 2024-06-05 ENCOUNTER — APPOINTMENT (OUTPATIENT)
Dept: LAB | Facility: CLINIC | Age: 34
End: 2024-06-05
Payer: COMMERCIAL

## 2024-06-05 ENCOUNTER — INITIAL PRENATAL (OUTPATIENT)
Dept: OBGYN CLINIC | Facility: CLINIC | Age: 34
End: 2024-06-05

## 2024-06-05 VITALS
SYSTOLIC BLOOD PRESSURE: 111 MMHG | HEART RATE: 89 BPM | HEIGHT: 66 IN | BODY MASS INDEX: 24.08 KG/M2 | WEIGHT: 149.8 LBS | DIASTOLIC BLOOD PRESSURE: 72 MMHG

## 2024-06-05 DIAGNOSIS — Z3A.10 10 WEEKS GESTATION OF PREGNANCY: ICD-10-CM

## 2024-06-05 DIAGNOSIS — Z34.91 PRENATAL CARE IN FIRST TRIMESTER: ICD-10-CM

## 2024-06-05 DIAGNOSIS — Z34.91 PRENATAL CARE IN FIRST TRIMESTER: Primary | ICD-10-CM

## 2024-06-05 LAB
ABO GROUP BLD: NORMAL
BACTERIA UR QL AUTO: ABNORMAL /HPF
BASOPHILS # BLD AUTO: 0.05 THOUSANDS/ÂΜL (ref 0–0.1)
BASOPHILS NFR BLD AUTO: 1 % (ref 0–1)
BILIRUB UR QL STRIP: NEGATIVE
BLD GP AB SCN SERPL QL: NEGATIVE
CLARITY UR: ABNORMAL
COLOR UR: ABNORMAL
EOSINOPHIL # BLD AUTO: 0.13 THOUSAND/ÂΜL (ref 0–0.61)
EOSINOPHIL NFR BLD AUTO: 1 % (ref 0–6)
ERYTHROCYTE [DISTWIDTH] IN BLOOD BY AUTOMATED COUNT: 13.1 % (ref 11.6–15.1)
GLUCOSE UR STRIP-MCNC: NEGATIVE MG/DL
HCT VFR BLD AUTO: 36.8 % (ref 34.8–46.1)
HGB BLD-MCNC: 12.2 G/DL (ref 11.5–15.4)
HGB UR QL STRIP.AUTO: NEGATIVE
IMM GRANULOCYTES # BLD AUTO: 0.12 THOUSAND/UL (ref 0–0.2)
IMM GRANULOCYTES NFR BLD AUTO: 1 % (ref 0–2)
KETONES UR STRIP-MCNC: NEGATIVE MG/DL
LEUKOCYTE ESTERASE UR QL STRIP: ABNORMAL
LYMPHOCYTES # BLD AUTO: 2.8 THOUSANDS/ÂΜL (ref 0.6–4.47)
LYMPHOCYTES NFR BLD AUTO: 27 % (ref 14–44)
MCH RBC QN AUTO: 29.7 PG (ref 26.8–34.3)
MCHC RBC AUTO-ENTMCNC: 33.2 G/DL (ref 31.4–37.4)
MCV RBC AUTO: 90 FL (ref 82–98)
MONOCYTES # BLD AUTO: 0.7 THOUSAND/ÂΜL (ref 0.17–1.22)
MONOCYTES NFR BLD AUTO: 7 % (ref 4–12)
NEUTROPHILS # BLD AUTO: 6.62 THOUSANDS/ÂΜL (ref 1.85–7.62)
NEUTS SEG NFR BLD AUTO: 63 % (ref 43–75)
NITRITE UR QL STRIP: NEGATIVE
NON-SQ EPI CELLS URNS QL MICRO: ABNORMAL /HPF
NRBC BLD AUTO-RTO: 0 /100 WBCS
PH UR STRIP.AUTO: 7.5 [PH]
PLATELET # BLD AUTO: 295 THOUSANDS/UL (ref 149–390)
PMV BLD AUTO: 10.2 FL (ref 8.9–12.7)
PROT UR STRIP-MCNC: ABNORMAL MG/DL
RBC # BLD AUTO: 4.11 MILLION/UL (ref 3.81–5.12)
RBC #/AREA URNS AUTO: ABNORMAL /HPF
RH BLD: POSITIVE
RUBV IGG SERPL IA-ACNC: 125.1 IU/ML
SP GR UR STRIP.AUTO: 1.01 (ref 1–1.03)
SPECIMEN EXPIRATION DATE: NORMAL
TREPONEMA PALLIDUM IGG+IGM AB [PRESENCE] IN SERUM OR PLASMA BY IMMUNOASSAY: NORMAL
UROBILINOGEN UR STRIP-ACNC: <2 MG/DL
WBC # BLD AUTO: 10.42 THOUSAND/UL (ref 4.31–10.16)
WBC #/AREA URNS AUTO: ABNORMAL /HPF

## 2024-06-05 PROCEDURE — 86762 RUBELLA ANTIBODY: CPT

## 2024-06-05 PROCEDURE — 87340 HEPATITIS B SURFACE AG IA: CPT

## 2024-06-05 PROCEDURE — 99211 OFF/OP EST MAY X REQ PHY/QHP: CPT

## 2024-06-05 PROCEDURE — 86706 HEP B SURFACE ANTIBODY: CPT

## 2024-06-05 PROCEDURE — 36415 COLL VENOUS BLD VENIPUNCTURE: CPT

## 2024-06-05 PROCEDURE — 83020 HEMOGLOBIN ELECTROPHORESIS: CPT

## 2024-06-05 PROCEDURE — 87389 HIV-1 AG W/HIV-1&-2 AB AG IA: CPT

## 2024-06-05 PROCEDURE — 87086 URINE CULTURE/COLONY COUNT: CPT

## 2024-06-05 PROCEDURE — 86780 TREPONEMA PALLIDUM: CPT

## 2024-06-05 PROCEDURE — 86900 BLOOD TYPING SEROLOGIC ABO: CPT

## 2024-06-05 PROCEDURE — 86803 HEPATITIS C AB TEST: CPT

## 2024-06-05 PROCEDURE — 85025 COMPLETE CBC W/AUTO DIFF WBC: CPT

## 2024-06-05 PROCEDURE — 86901 BLOOD TYPING SEROLOGIC RH(D): CPT

## 2024-06-05 PROCEDURE — 86850 RBC ANTIBODY SCREEN: CPT

## 2024-06-05 PROCEDURE — 81001 URINALYSIS AUTO W/SCOPE: CPT

## 2024-06-05 NOTE — PATIENT INSTRUCTIONS
SENALES FERN EL EMBARAZO  Llame a nuestra oficina al [phone] para cualquiera de los siguientes:    1. sangrado vaginal  2. Dolor abdominal mitch que no desaparece.  3. Fiebre (más de 100.4 y no se paresh con Tylenol)  4. Vómitos persistentes que abdalla más de 24 horas.  5. dolor de pecho  6. Dolor o ardor al orinar.  7. Dolor de naz severo que no se resuelve con Tylenol  8. Visión borrosa o leonie puntos en patel visión.  9. Hinchazón repentina de patel mani o rashida.  10. Enrojecimiento, hinchazón o dolor en rajiv pierna.  11. Un aumento de peso repentino en pocos días.  12. Contar los movimientos fetales del bebé. (después de 28 semanas o el sexto mes de embarazo)  13. Rajiv pérdida de líquido acuoso de la vagina: puede ser un chorro, un goteo o rajiv humedad continua  14. Después de 20 semanas de embarazo, calambres rítmicos (más de 4 por hora) o menstruales yamel dolor bajo / pélvico

## 2024-06-05 NOTE — PROGRESS NOTES
"OBSTETRIC INTAKE VISIT    Etta Barriga presents today for initial OB visit and intake at 10w5d. LMP - 3/15/24. History obtained from patient and she reports it as follows:    Past Medical History:   Diagnosis Date    Back pain 2023    Hyperlipidemia     Kidney stone      Past Surgical History:   Procedure Laterality Date    NO PAST SURGERIES       OB History    Para Term  AB Living   3 2 2 0 0 2   SAB IAB Ectopic Multiple Live Births   0 0 0 0 2      # Outcome Date GA Lbr Rory/2nd Weight Sex Type Anes PTL Lv   3 Current            2 Term 20 41w0d / 00:08 3856 g (8 lb 8 oz) M Vag-Spont EPI  ANITRA   1 Term 18 40w0d  3374 g (7 lb 7 oz) F Vag-Spont EPI N ANITRA      Birth Comments: FOB1     Social History     Tobacco Use    Smoking status: Never     Passive exposure: Never    Smokeless tobacco: Never   Vaping Use    Vaping status: Never Used   Substance Use Topics    Alcohol use: Not Currently     Comment: couple times/month    Drug use: Never       Current Outpatient Medications   Medication Instructions    acetaminophen (TYLENOL) 650 mg, Oral, 4 times daily PRN    Prenatal Vit-Fe Fumarate-FA (PRENATAL VITAMINS PO) Oral, Daily       No Known Allergies    Vitals: /72 (BP Location: Left arm, Patient Position: Sitting, Cuff Size: Standard)   Pulse 89   Ht 5' 6\" (1.676 m)   Wt 67.9 kg (149 lb 12.8 oz)   LMP 03/15/2024 (Approximate)   BMI 24.18 kg/m²     Review of Systems:  Denies vaginal bleeding or leaking fluid.  Denies abdominal/pelvic pain or contractions.    Plan:  1. OB labwork ordered today to include Prenatal Panel, HCV antibody, Hgb fractionation cascade. Declines doing Prenatal Carrier Screen Panel.  Advised patient to have drawn within the next few days.  2. Next ultrasound is scheduled for 24.  3. Return 24 for H&P prenatal visit.  4. Referrals placed for WIC, , and [facility].  5. Given vaccines: None due.  6. Patient's " depression screening was assessed with a PHQ-2 score of 2. Their PHQ-9 score was 8. Clinically patient does not have depression. No treatment is required.   in to see patient.  7. Reviewed the following educational topics with patient:   -routine prenatal visit/ultrasound/labwork schedule   -hospital for delivery and office phone/answering service contact information   -nutritional demands of pregnancy, healthy dietary habits   -listeria, toxoplasmosis, seafood precautions   -weight gain expectations (based on pre-pregnant BMI)   -exercise, rest, and sexual activity during pregnancy   -abstinence from alcohol, tobacco, and illegal drugs   -common discomforts of pregnancy and appropriate management   -OTC medications safe to use in pregnancy   -genetic screening options   -vaccines in pregnancy   -symptoms to report to OB provider    -signs of PTL and pre-eclampsia    -vaginal bleeding/leaking of fluid    -severe n/v-unable to tolerate ANY food/fluids for more than 24 hours

## 2024-06-05 NOTE — LETTER
6/5/2024      This letter is to confirm that Etta Barriga is pregnant and is under our care.  Her Estimated Date of Delivery: 12/27/24.    If you have any questions or concerns, please contact our office.  Thank you,    CESAR Amador

## 2024-06-05 NOTE — LETTER
"    [facility] REFERRAL      Date: 6/5/2024    Patient name: Etta Barriga    YOB: 1990    Estimated Date of Delivery: 12/27/24      /72 (BP Location: Left arm, Patient Position: Sitting, Cuff Size: Standard)   Pulse 89   Ht 5' 6\" (1.676 m)   Wt 67.9 kg (149 lb 12.8 oz)   LMP 03/15/2024 (Approximate)   BMI 24.18 kg/m²         Thank you,  CESAR Amador            [facility] locations:   1. [facility]       [address] [place] [address]       Phone: [phone]       Fax: [phone]     2. [facility]       [address] [address]       Phone: [phone]       Fax: [phone]       "

## 2024-06-06 LAB
BACTERIA UR CULT: NORMAL
HBV SURFACE AB SER-ACNC: 23.4 MIU/ML
HBV SURFACE AG SER QL: NORMAL
HCV AB SER QL: NORMAL
HIV 1+2 AB+HIV1 P24 AG SERPL QL IA: NORMAL
HIV 2 AB SERPL QL IA: NORMAL
HIV1 AB SERPL QL IA: NORMAL
HIV1 P24 AG SERPL QL IA: NORMAL

## 2024-06-11 ENCOUNTER — ROUTINE PRENATAL (OUTPATIENT)
Age: 34
End: 2024-06-11
Payer: COMMERCIAL

## 2024-06-11 VITALS
SYSTOLIC BLOOD PRESSURE: 112 MMHG | WEIGHT: 149.6 LBS | BODY MASS INDEX: 24.04 KG/M2 | HEIGHT: 66 IN | HEART RATE: 100 BPM | DIASTOLIC BLOOD PRESSURE: 60 MMHG

## 2024-06-11 DIAGNOSIS — Z36.0 ENCOUNTER FOR ANTENATAL SCREENING FOR CHROMOSOMAL ANOMALIES: Primary | ICD-10-CM

## 2024-06-11 DIAGNOSIS — Z3A.11 11 WEEKS GESTATION OF PREGNANCY: ICD-10-CM

## 2024-06-11 DIAGNOSIS — Z36.82 ENCOUNTER FOR (NT) NUCHAL TRANSLUCENCY SCAN: ICD-10-CM

## 2024-06-11 PROCEDURE — 99243 OFF/OP CNSLTJ NEW/EST LOW 30: CPT | Performed by: NURSE PRACTITIONER

## 2024-06-11 PROCEDURE — 76813 OB US NUCHAL MEAS 1 GEST: CPT | Performed by: OBSTETRICS & GYNECOLOGY

## 2024-06-11 NOTE — LETTER
2024     Anthony Ferguson MD  [address] [place] [id number]    Patient: Etta Barriga   YOB: 1990   Date of Visit: 2024       Dear Dr. Ferguson:    Thank you for referring Etta Barriga to me for evaluation. Below are my notes for this consultation.    If you have questions, please do not hesitate to call me. I look forward to following your patient along with you.         Sincerely,        Laine Vargas MD        CC: No Recipients    Laine Vargas MD  2024  2:09 PM  Sign when Signing Visit  OFFICE CONSULT      Dear Ms. Ramirez,    Thank you for requesting a  consultation on your patient Etta Barriga  for the following indications:  Genetic screening    History  Medications: Prenatal vitamins and Tylenol  Allergies to medications: No known drug allergies  Past medical history: Renal calculi  Past surgical history: Denies  Past obstetrical history: Between 2018 and 2020 she had 2 term vaginal deliveries of neonates weighing between 7 pounds 7 ounces and 8 pounds 8 ounces.  She denies pregnancy complications.  Social history: Denies current use of alcohol, tobacco or drugs of abuse.  First generation family history: Noncontributory    Ultrasound findings: The ultrasound shows a fetus concordant with dates. The nasal bone and nuchal translucency appear normal. No malformations are seen on today's early ultrasound.     The patient was informed of the findings and counseled about the limitations of the exam in detecting all forms of fetal congenital abnormalities. First trimester anatomy is inherently limited by gestational age, fetal development, and challenging resolution due to acoustic penetration. First trimester anatomy should not replace a detailed fetal anatomic evaluation in the second trimester with regard to identifying fetal anatomic variants. Many major anomalies can be identified in the late first trimester;  however, the aforementioned limitations are important to recognize and understand.    She does not report any vaginal bleeding or uterine cramping or contractions.      Specific counseling was provided on the following problems:  1. We discussed the options for genetic screening which include invasive testing on the fetal placenta or on fetal skin cells within the amniotic fluid and compared this to noninvasive testing which includes cell free DNA screening and the sequential screen.  We reviewed the risks, the benefits and the limitations of each.  In the end patient declines to complete the cell free DNA screen.           2.  We reviewed current recommendations regarding  Flu, COVID and RSV (third trimester) vaccines by the American College of Obstetricians and Gynecologists and the Society for Maternal-Fetal Medicine. We discussed reassuring pregnancy outcome information after vaccination. We discussed the increased severity of infections and the resultant maternal and fetal complications that can arise with a severe infection including  labor.  Vaccines have been found to generate  antibodies in pregnant and lactating women similar to that observed in non-pregnant women. Vaccine-induced antibody levels were significantly greater than the levels found in response to natural infection. Immune transfer of these antibodies to neonates is found to occur via the placenta and breast milk.    Future tests recommended:  The results of her NIPT will return in 7-10 days and her OB office will order an MSAFP screen at 16-18 weeks to screen for spina bifida.     Future ultrasounds ordered today:   Fetal Level II ultrasound imaging is recommended at 19-20 weeks' gestation.      Split-shared decision-making between CESAR Craft and myself was utilized, with the majority of the time spent by DIOGENES Craft.  Medical decision-making for this encounter was low (diagnosis low, data low and risk low).    I reviewed the  ultrasound pictures and recommended the medical decision making transcribed in the care of this patient.      Laine Vargas M.D.

## 2024-06-11 NOTE — PROGRESS NOTES
OFFICE CONSULT      Dear Ms. Ramirez,    Thank you for requesting a  consultation on your patient Etta Barriga  for the following indications:  Genetic screening    History  Medications: Prenatal vitamins and Tylenol  Allergies to medications: No known drug allergies  Past medical history: Renal calculi  Past surgical history: Denies  Past obstetrical history: Between 2018 and 2020 she had 2 term vaginal deliveries of neonates weighing between 7 pounds 7 ounces and 8 pounds 8 ounces.  She denies pregnancy complications.  Social history: Denies current use of alcohol, tobacco or drugs of abuse.  First generation family history: Noncontributory    Ultrasound findings: The ultrasound shows a fetus concordant with dates. The nasal bone and nuchal translucency appear normal. No malformations are seen on today's early ultrasound.     The patient was informed of the findings and counseled about the limitations of the exam in detecting all forms of fetal congenital abnormalities. First trimester anatomy is inherently limited by gestational age, fetal development, and challenging resolution due to acoustic penetration. First trimester anatomy should not replace a detailed fetal anatomic evaluation in the second trimester with regard to identifying fetal anatomic variants. Many major anomalies can be identified in the late first trimester; however, the aforementioned limitations are important to recognize and understand.    She does not report any vaginal bleeding or uterine cramping or contractions.      Specific counseling was provided on the following problems:  1. We discussed the options for genetic screening which include invasive testing on the fetal placenta or on fetal skin cells within the amniotic fluid and compared this to noninvasive testing which includes cell free DNA screening and the sequential screen.  We reviewed the risks, the benefits and the limitations of each.  In the  end patient declines to complete the cell free DNA screen.           2.  We reviewed current recommendations regarding  Flu, COVID and RSV (third trimester) vaccines by the American College of Obstetricians and Gynecologists and the Society for Maternal-Fetal Medicine. We discussed reassuring pregnancy outcome information after vaccination. We discussed the increased severity of infections and the resultant maternal and fetal complications that can arise with a severe infection including  labor.  Vaccines have been found to generate  antibodies in pregnant and lactating women similar to that observed in non-pregnant women. Vaccine-induced antibody levels were significantly greater than the levels found in response to natural infection. Immune transfer of these antibodies to neonates is found to occur via the placenta and breast milk.    Future tests recommended:  The results of her NIPT will return in 7-10 days and her OB office will order an MSAFP screen at 16-18 weeks to screen for spina bifida.     Future ultrasounds ordered today:   Fetal Level II ultrasound imaging is recommended at 19-20 weeks' gestation.      Split-shared decision-making between CESAR Craft and myself was utilized, with the majority of the time spent by DIOGENES Craft.  Medical decision-making for this encounter was low (diagnosis low, data low and risk low).    I reviewed the ultrasound pictures and recommended the medical decision making transcribed in the care of this patient.      Laine Vargas M.D.

## 2024-06-12 LAB
HGB A MFR BLD: 2.5 % (ref 1.8–3.2)
HGB A MFR BLD: 97.5 % (ref 96.4–98.8)
HGB F MFR BLD: 0 % (ref 0–2)
HGB FRACT BLD-IMP: NORMAL
HGB S MFR BLD: 0 %

## 2024-06-13 ENCOUNTER — PATIENT OUTREACH (OUTPATIENT)
Dept: OBGYN CLINIC | Facility: CLINIC | Age: 34
End: 2024-06-13

## 2024-06-13 NOTE — PROGRESS NOTES
VIMAL KENNEYD was referred by CESAR Bob to complete a PN SW assessment in the first trimester. Pt is currently , she is [id number], her FRANK is 2024. Pt is english speaking, VIMAL KENNEDY called pt to complete an assessment over the phone.     Pt reports this pregnancy is unplanned but welcomed. Pt lives with her , kids, MIL and LOC. Pt has good family support. Pt is employed as is her . Pt has MA, SNAP and plans to apply for WIC. She has no concerns obtaining baby supplies. Pt drives herself to medical appointments.     Pt denies any history or concerns for CYS, legal, IPV, MH or D&A.     VIMAL KENNEDY encouraged the pt to outreach as needed but will close this referral at this time as no needs are identified.

## 2024-06-18 ENCOUNTER — INITIAL PRENATAL (OUTPATIENT)
Dept: OBGYN CLINIC | Facility: CLINIC | Age: 34
End: 2024-06-18

## 2024-06-18 VITALS
HEIGHT: 66 IN | DIASTOLIC BLOOD PRESSURE: 66 MMHG | WEIGHT: 150.2 LBS | SYSTOLIC BLOOD PRESSURE: 102 MMHG | HEART RATE: 103 BPM | BODY MASS INDEX: 24.14 KG/M2

## 2024-06-18 DIAGNOSIS — Z3A.12 12 WEEKS GESTATION OF PREGNANCY: Primary | ICD-10-CM

## 2024-06-18 PROCEDURE — 87491 CHLMYD TRACH DNA AMP PROBE: CPT

## 2024-06-18 PROCEDURE — 87086 URINE CULTURE/COLONY COUNT: CPT

## 2024-06-18 PROCEDURE — 99213 OFFICE O/P EST LOW 20 MIN: CPT | Performed by: OBSTETRICS & GYNECOLOGY

## 2024-06-18 PROCEDURE — 87591 N.GONORRHOEAE DNA AMP PROB: CPT

## 2024-06-18 NOTE — PROGRESS NOTES
OB/GYN  PRENATAL H&P VISIT  Etta Barriga [id number]  2024    SUBJECTIVE  Patient is 34 y.o.  @ 12w4d here for prenatal H&P visit. unplanned pregnancy. Final dating by ultrasound. Symptoms today: nausea. denies obstetric complaints including cramping, vaginal bleeding, leakage of fluid. 2 previous pregnancies: . Complications: none.    She denies hx of sexually transmitted infections, abnormal pap smears, fibroids, ovarian cysts or other gyn pathology.     Denies cardiac disease  and diabetes .     She is employed at [employer] and reports a support system at home. She is here  today by herself.   She denies a hx of TB or close contacts with persons with TB.   She denies  a family history of inheritable conditions such as physical or intellectual disabilities, birth defects, blood disorders, heart or neural tube defects.   Denies  history of MRSA. denies recent travel or travel planned in the near future. She traveled to [place] 6 months agono known exposure to COVID.  She denies use of cigarettes, vaping, hookah, alcohol or other recreational/illlicit drug use .     Review of Systems   Constitutional:  Negative for chills and fever.   Eyes:  Negative for photophobia and visual disturbance.   Respiratory:  Negative for cough.    Cardiovascular:  Negative for chest pain and leg swelling.   Gastrointestinal:  Positive for nausea and vomiting. Negative for abdominal pain and diarrhea.   Genitourinary:  Negative for dysuria, vaginal bleeding and vaginal discharge.   Neurological:  Negative for dizziness and headaches.       Past Medical History:   Diagnosis Date    Back pain 2023    Hyperlipidemia     Kidney stone        Past Surgical History:   Procedure Laterality Date    NO PAST SURGERIES         Social History     Socioeconomic History    Marital status: /Civil Union     Spouse name: Not on file    Number of children: Not on file    Years of education: Not on file    Highest  education level: Not on file   Occupational History    Not on file   Tobacco Use    Smoking status: Never     Passive exposure: Never    Smokeless tobacco: Never   Vaping Use    Vaping status: Never Used   Substance and Sexual Activity    Alcohol use: Not Currently     Comment: couple times/month    Drug use: Never    Sexual activity: Yes     Partners: Male   Other Topics Concern    Not on file   Social History Narrative    Not on file     Social Determinants of Health     Financial Resource Strain: Low Risk  (6/18/2024)    Overall Financial Resource Strain (CARDIA)     Difficulty of Paying Living Expenses: Not hard at all   Recent Concern: Financial Resource Strain - Medium Risk (4/30/2024)    Overall Financial Resource Strain (CARDIA)     Difficulty of Paying Living Expenses: Somewhat hard   Food Insecurity: No Food Insecurity (6/18/2024)    Hunger Vital Sign     Worried About Running Out of Food in the Last Year: Never true     Ran Out of Food in the Last Year: Never true   Transportation Needs: No Transportation Needs (6/18/2024)    PRAPARE - Transportation     Lack of Transportation (Medical): No     Lack of Transportation (Non-Medical): No   Physical Activity: Not on file   Stress: Not on file   Social Connections: Not on file   Intimate Partner Violence: Not on file   Housing Stability: Low Risk  (6/18/2024)    Housing Stability Vital Sign     Unable to Pay for Housing in the Last Year: No     Number of Times Moved in the Last Year: 0     Homeless in the Last Year: No   Recent Concern: Housing Stability - High Risk (4/30/2024)    Housing Stability Vital Sign     Unable to Pay for Housing in the Last Year: Yes     Number of Places Lived in the Last Year: 1     Unstable Housing in the Last Year: No       OBJECTIVE  Vitals:    06/18/24 1136   BP: 102/66   Pulse: 103       Physical Exam:  General:   alert, appears stated age, and cooperative   Heart: regular rate and rhythm, S1, S2 normal, no murmur, click, rub  or gallop   Lungs: clear to auscultation bilaterally   Breasts: deferred   Abdomen: soft, non-tender gravid   Vulva: normal   Vagina: normal mucosa   Cervix: multiparous appearance and no lesions   Uterus: Appropriate for gestational age   Fetal heart tones:  121bpm   Adnexa: normal adnexa     Prenatal Labs: I have personally reviewed pertinent reports.        ASSESSMENT AND PLAN  34 y.o.  now at 12w4d.   -Pregnancy: Prenatal labs wnl except UTI S/P treatment.  labor precautions, appointment schedule, nutrition, weight gain, exercise, medications, sexual intercourse, and nausea/vomiting of pregnancy reviewed today.  -Pap smear: 10/25/2023, nilm HPV neg.next due 10/2028  -Gonorrhea/Chlamydia: collected today 24  -Ultrasounds & Aneuploidy screenin/11 1st trimester scan appropriately grown, anterior placenta. level II scheduled for .   -Consents: delivery consent to be signed at 28-w visit.   -Delivery plan :   -Postpartum: Patient  to breastfeed and supplement.  Contraception plan permanent contraception with tubal ligation. MA31 28 wks  -COVID-19: S/P -2021 , Offer  Flu  and RSV vaccines during season . Tdap at 28 wks  - Urinary tract infection in pregnancy: S/P treatment ,  repeat culture collected today  - Follow up: RTO in 4 weeks. Precautions have been reviewed.    Juli Gonzales MD  PGY II, OB/GYN  2024, 4:07 PM

## 2024-06-19 LAB
BACTERIA UR CULT: NORMAL
C TRACH DNA SPEC QL NAA+PROBE: NEGATIVE
N GONORRHOEA DNA SPEC QL NAA+PROBE: NEGATIVE

## 2024-07-05 ENCOUNTER — ROUTINE PRENATAL (OUTPATIENT)
Dept: OBGYN CLINIC | Facility: CLINIC | Age: 34
End: 2024-07-05

## 2024-07-05 VITALS
HEART RATE: 77 BPM | BODY MASS INDEX: 25.01 KG/M2 | SYSTOLIC BLOOD PRESSURE: 101 MMHG | HEIGHT: 66 IN | WEIGHT: 155.6 LBS | DIASTOLIC BLOOD PRESSURE: 61 MMHG

## 2024-07-05 DIAGNOSIS — Z13.79 GENETIC SCREENING: ICD-10-CM

## 2024-07-05 DIAGNOSIS — Z34.92 PRENATAL CARE IN SECOND TRIMESTER: Primary | ICD-10-CM

## 2024-07-05 DIAGNOSIS — O99.612 CONSTIPATION DURING PREGNANCY IN SECOND TRIMESTER: ICD-10-CM

## 2024-07-05 DIAGNOSIS — Z3A.15 15 WEEKS GESTATION OF PREGNANCY: ICD-10-CM

## 2024-07-05 DIAGNOSIS — K59.00 CONSTIPATION DURING PREGNANCY IN SECOND TRIMESTER: ICD-10-CM

## 2024-07-05 PROCEDURE — 99214 OFFICE O/P EST MOD 30 MIN: CPT | Performed by: NURSE PRACTITIONER

## 2024-07-05 RX ORDER — DOCUSATE SODIUM 100 MG/1
100 CAPSULE, LIQUID FILLED ORAL 2 TIMES DAILY
Qty: 30 CAPSULE | Refills: 2 | Status: SHIPPED | OUTPATIENT
Start: 2024-07-05

## 2024-07-05 NOTE — PROGRESS NOTES
"Assessment & Plan  34 y.o.  at 15w0d presenting for routine prenatal visit.   C/O intermittent constipation  Has appointment for level Ii U/S  Advised to complete second part of genetic screening    Problem List Items Addressed This Visit          Obstetrics/Gynecology    15 weeks gestation of pregnancy    Relevant Medications    docusate sodium (COLACE) 100 mg capsule       Other    Genetic screening     Other Visit Diagnoses       Prenatal care in second trimester    -  Primary    Constipation during pregnancy in second trimester        Relevant Medications    docusate sodium (COLACE) 100 mg capsule          ____________________________________________________________  Subjective  .   She denies contractions, loss of fluid, or vaginal bleeding.   She medina not feels regular fetal movements.     WNL respiratory effort, negative cough or SOB    Objective  /61 (BP Location: Left arm, Patient Position: Sitting, Cuff Size: Standard)   Pulse 77   Ht 5' 6\" (1.676 m)   Wt 70.6 kg (155 lb 9.6 oz)   LMP 03/15/2024 (Approximate)   BMI 25.11 kg/m²          Patient's Active Problem List  Patient Active Problem List   Diagnosis    Elevated random blood glucose level    Pure hypercholesterolemia    15 weeks gestation of pregnancy    Genetic screening       PLAN  To call with vaginal bleeding, loss of fluid, regular contractions, decreased fetal movement, other needs or concerns.  Complete second part of genetic screening  Keep appointment for level Ii U/S  Take colace as needed  Return in 4 weeks  Pt verbalized understanding of all discussed.          "

## 2024-07-05 NOTE — PATIENT INSTRUCTIONS
WARNING SIGNS DURING PREGNANCY  Call our office at [phone] for any of the followin. Vaginal bleeding  2. Sharp abdominal pain that does not go away  3. Fever (more than 100.4 and is not relieved by Tylenol)  4. Persistent vomiting lasting greater than 24 hours  5. Chest pain   6. Pain or burning when you urinate  7. Severe headache that doesn't resolve with Tylenol  8. Blurred vision or seeing spots in your vision  9. Sudden swelling of your face or hands  10. Redness, swelling or pain in a leg  11. A sudden weight gain in just a few days  12. Decrease in your baby's movement (after 28 weeks or the 6th month of pregnancy)  13. A loss of watery fluid from your vagina - can be a gush, a trickle or continuous wetness  14. After 20 weeks of pregnancy, rhythmic Keep appointment for level Ii U/S  Return in 4 weekscramping (greater than 4 per hour) or menstrual like low/pelvic pain     Complete second part of genetic screening  Keep appointment for level Ii U/S  Take colace as needed  Return in 4 weeks

## 2024-07-12 ENCOUNTER — TELEPHONE (OUTPATIENT)
Dept: OBGYN CLINIC | Facility: CLINIC | Age: 34
End: 2024-07-12

## 2024-07-12 DIAGNOSIS — Z3A.16 16 WEEKS GESTATION OF PREGNANCY: Primary | ICD-10-CM

## 2024-07-12 RX ORDER — PNV NO.95/FERROUS FUM/FOLIC AC 28MG-0.8MG
1 TABLET ORAL DAILY
Qty: 90 TABLET | Refills: 3 | Status: SHIPPED | OUTPATIENT
Start: 2024-07-12

## 2024-07-31 ENCOUNTER — ROUTINE PRENATAL (OUTPATIENT)
Dept: OBGYN CLINIC | Facility: CLINIC | Age: 34
End: 2024-07-31

## 2024-07-31 VITALS
BODY MASS INDEX: 25.94 KG/M2 | HEART RATE: 85 BPM | WEIGHT: 161.4 LBS | HEIGHT: 66 IN | SYSTOLIC BLOOD PRESSURE: 116 MMHG | DIASTOLIC BLOOD PRESSURE: 78 MMHG

## 2024-07-31 DIAGNOSIS — Z34.82 NORMAL PREGNANCY IN MULTIGRAVIDA IN SECOND TRIMESTER: Primary | ICD-10-CM

## 2024-07-31 DIAGNOSIS — M94.0 ACUTE COSTOCHONDRITIS: ICD-10-CM

## 2024-07-31 PROBLEM — Z3A.18 18 WEEKS GESTATION OF PREGNANCY: Status: ACTIVE | Noted: 2024-06-18

## 2024-07-31 PROCEDURE — 99213 OFFICE O/P EST LOW 20 MIN: CPT | Performed by: OBSTETRICS & GYNECOLOGY

## 2024-07-31 RX ORDER — SENNOSIDES 8.6 MG
650 CAPSULE ORAL 3 TIMES DAILY PRN
Qty: 30 TABLET | Refills: 0 | Status: SHIPPED | OUTPATIENT
Start: 2024-07-31

## 2024-07-31 NOTE — LETTER
2024     Patient: Etta Barriga  YOB: 1990  Date of Visit: 2024      To Whom it May Concern:    Etta Barriga is under my professional care.     2024    To Etta Barriga  : 1990      To whom it may concern,    Etta Barriga is under our care for her pregnancy.  We recommend that all pregnant women:   1. Have a well-ventilated workspace.   2. Wear low-heeled shoes.   3. Work no more than 40 hours per week.   4. Have a 15-minute break every 2 hours and at least 30 minutes for a meal.   5. Lift no more than 20 pounds without assistance.   6. Have ready access to bathrooms and water.   7. Should not be climbing ladders.    She may continue to work until her due date (Estimated Date of Delivery: 24) unless medical complications arise sooner.  We anticipate she may return to work in 6-8 weeks after delivery.    Please contact our office with any questions or concerns.    Sincerely,  Joleen Giraldo MD      If you have any questions or concerns, please don't hesitate to call.

## 2024-07-31 NOTE — PROGRESS NOTES
"Assessment & Plan  34 y.o.  at 18w5d presenting for routine prenatal visit.       Problem List       Elevated random blood glucose level    Pure hypercholesterolemia    18 weeks gestation of pregnancy    Overview     Overview:  Labs  Pap smear: NILM ; GC/CT negative  Prenatal panel wnl  28w labs**  Vaccines:  Covid vaccine: vaccinated in   Tdap vaccine: **  Genetic screening-declines, AFP not performed  TWG 5.2 kg  Pre-gravid BMI 23  Recommended weight gain 25-35 lb  Contraception: **  Feeding plan: breast/bottle**  Birth plan:    Delivery consent: to be signed at 28w  Ultrasounds: NT on 24, Level II U/S 24               Genetic screening    Overview     AFP ordered 2024          Other Visit Diagnoses       Normal pregnancy in multigravida in second trimester    -  Primary    Acute costochondritis              ____________________________________________________________  Subjective  She reports feeling a stretching sensation  She denies contractions, loss of fluid, or vaginal bleeding.   She does feel flutters      Objective  /78 (BP Location: Left arm, Patient Position: Sitting, Cuff Size: Standard)   Pulse 85   Ht 5' 6\" (1.676 m)   Wt 73.2 kg (161 lb 6.4 oz)   LMP 03/15/2024 (Approximate)   BMI 26.05 kg/m²   FHR: 140's via doppler    Physical Exam  Constitutional:       Appearance: She is well-developed.   Cardiovascular:      Rate and Rhythm: Normal rate and regular rhythm.      Heart sounds: Normal heart sounds. No murmur heard.     No friction rub. No gallop.   Pulmonary:      Effort: Pulmonary effort is normal. No respiratory distress.      Breath sounds: No wheezing.   Abdominal:      Palpations: Abdomen is soft.      Tenderness: There is no abdominal tenderness.   Musculoskeletal:         General: No tenderness.   Neurological:      Mental Status: She is alert and oriented to person, place, and time.   Vitals reviewed.          Patient's Active Problem List  Patient " Active Problem List   Diagnosis    Elevated random blood glucose level    Pure hypercholesterolemia    18 weeks gestation of pregnancy    Genetic screening           Joleen Giraldo MD  7/31/2024  8:53 AM

## 2024-08-13 ENCOUNTER — TELEPHONE (OUTPATIENT)
Dept: OBGYN CLINIC | Facility: CLINIC | Age: 34
End: 2024-08-13

## 2024-08-13 ENCOUNTER — ROUTINE PRENATAL (OUTPATIENT)
Age: 34
End: 2024-08-13
Payer: COMMERCIAL

## 2024-08-13 VITALS
HEART RATE: 104 BPM | SYSTOLIC BLOOD PRESSURE: 100 MMHG | WEIGHT: 163 LBS | BODY MASS INDEX: 26.2 KG/M2 | DIASTOLIC BLOOD PRESSURE: 50 MMHG | HEIGHT: 66 IN

## 2024-08-13 DIAGNOSIS — Z34.92 PRENATAL CARE IN SECOND TRIMESTER: Primary | ICD-10-CM

## 2024-08-13 DIAGNOSIS — Z3A.20 20 WEEKS GESTATION OF PREGNANCY: Primary | ICD-10-CM

## 2024-08-13 PROCEDURE — 76817 TRANSVAGINAL US OBSTETRIC: CPT | Performed by: OBSTETRICS & GYNECOLOGY

## 2024-08-13 PROCEDURE — 76805 OB US >/= 14 WKS SNGL FETUS: CPT | Performed by: OBSTETRICS & GYNECOLOGY

## 2024-08-13 PROCEDURE — 99213 OFFICE O/P EST LOW 20 MIN: CPT | Performed by: OBSTETRICS & GYNECOLOGY

## 2024-08-13 NOTE — TELEPHONE ENCOUNTER
Pt calling saying she's congested, has Has, body aches and wanted to know what she can take. Told her she can take robitussin, claritin during the day, benadryl at night, tylenol every 6 to 8 hours and to drink plenty of water. Pt verbalized understanding.

## 2024-08-13 NOTE — PROGRESS NOTES
Ultrasound Probe Disinfection    A transvaginal ultrasound was performed.   Prior to use, disinfection was performed with High Level Disinfection Process ([employer]).  Probe serial number S2: [id number] was used.    Mercy Dye  08/13/24  8:03 AM

## 2024-08-13 NOTE — PROGRESS NOTES
A fetal ultrasound was completed. See Ob procedures in Epic for an interpretation and recommendations. Do not hesitate to contact us in [facility] if you have questions.    Jayesh Truong MD, MSCE  Maternal Fetal Medicine

## 2024-08-13 NOTE — LETTER
August 13, 2024     Anthony Ferguson MD  [address] [place] [id number]    Patient: Etta Barriga   YOB: 1990   Date of Visit: 8/13/2024       Dear Dr. Ferguson:    Thank you for referring Etta Barriga to me for evaluation. Below are my notes for this consultation.    If you have questions, please do not hesitate to call me. I look forward to following your patient along with you.         Sincerely,        Jayesh Truong MD        CC: No Recipients    Jayesh Truong MD  8/13/2024  9:24 AM  Sign when Signing Visit  A fetal ultrasound was completed. See Ob procedures in Epic for an interpretation and recommendations. Do not hesitate to contact us in [facility] if you have questions.    Jayesh Truong MD, MSCE  Maternal Fetal Medicine

## 2024-08-16 ENCOUNTER — APPOINTMENT (OUTPATIENT)
Dept: LAB | Facility: CLINIC | Age: 34
End: 2024-08-16
Payer: COMMERCIAL

## 2024-08-16 DIAGNOSIS — Z34.92 PRENATAL CARE IN SECOND TRIMESTER: ICD-10-CM

## 2024-08-16 PROCEDURE — 36415 COLL VENOUS BLD VENIPUNCTURE: CPT

## 2024-08-16 PROCEDURE — 82105 ALPHA-FETOPROTEIN SERUM: CPT

## 2024-08-19 LAB
2ND TRIMESTER 4 SCREEN SERPL-IMP: NORMAL
AFP ADJ MOM SERPL: 1.23
AFP INTERP AMN-IMP: NORMAL
AFP INTERP SERPL-IMP: NORMAL
AFP INTERP SERPL-IMP: NORMAL
AFP SERPL-MCNC: 77.9 NG/ML
AGE AT DELIVERY: 34.7 YR
GA METHOD: NORMAL
GA: 21 WEEKS
IDDM PATIENT QL: NO
MULTIPLE PREGNANCY: NO
NEURAL TUBE DEFECT RISK FETUS: 5926 %

## 2024-08-20 ENCOUNTER — TELEPHONE (OUTPATIENT)
Dept: OBGYN CLINIC | Facility: CLINIC | Age: 34
End: 2024-08-20

## 2024-08-29 ENCOUNTER — ROUTINE PRENATAL (OUTPATIENT)
Dept: OBGYN CLINIC | Facility: CLINIC | Age: 34
End: 2024-08-29

## 2024-08-29 VITALS
DIASTOLIC BLOOD PRESSURE: 71 MMHG | SYSTOLIC BLOOD PRESSURE: 108 MMHG | BODY MASS INDEX: 27.08 KG/M2 | WEIGHT: 167.8 LBS | HEART RATE: 82 BPM

## 2024-08-29 DIAGNOSIS — Z3A.22 22 WEEKS GESTATION OF PREGNANCY: ICD-10-CM

## 2024-08-29 DIAGNOSIS — Z34.92 PRENATAL CARE IN SECOND TRIMESTER: Primary | ICD-10-CM

## 2024-08-29 PROCEDURE — 99213 OFFICE O/P EST LOW 20 MIN: CPT | Performed by: NURSE PRACTITIONER

## 2024-08-29 NOTE — PATIENT INSTRUCTIONS
Thank you for your confidence in our team.   We appreciate you and welcome your feedback.   If you receive a survey from us, please take a few moments to let us know how we are doing.   Sincerely,  CESAR Vásquez     WARNING SIGNS DURING PREGNANCY  Call our office at [phone] for any of the followin. Vaginal bleeding  2. Sharp abdominal pain that does not go away  3. Fever (more than 100.4 and is not relieved by Tylenol)  4. Persistent vomiting lasting greater than 24 hours  5. Chest pain   6. Pain or burning when you urinate  7. Severe headache that doesn't resolve with Tylenol  8. Blurred vision or seeing spots in your vision  9. Sudden swelling of your face or hands  10. Redness, swelling or pain in a leg  11. A sudden weight gain in just a few days  12. Decrease in your baby's movement (after 28 weeks or the 6th month of pregnancy)  13. A loss of watery fluid from your vagina - can be a gush, a trickle or continuous wetness  14. After 20 weeks of pregnancy, rhythmic cramping (greater than 4 per hour) or menstrual like low/pelvic pain

## 2024-08-29 NOTE — PROGRESS NOTES
Etta Barriga presents today for routine OB visit at 22w6d.  Blood Pressure: 108/71  Wt=76.1 kg (167 lb 12.8 oz); Body mass index is 27.08 kg/m².; TWG=10.8 kg (23 lb 12.8 oz)  Fetal Heart Rate: 146; Fundal Height (cm): 23 cm  Abdomen: gravid, soft, non-tender.  She reports occasional headaches relieved by Tylenol.  Denies uterine contractions or persistent cramping.  Denies vaginal bleeding or leaking of fluid.  Reports fetal movement.  Reviewed common discomforts of pregnancy in second trimester and warning signs.  Advised to continue medications and return in 4 weeks.      Current Outpatient Medications   Medication Instructions    acetaminophen (TYLENOL) 650 mg, Oral, 3 times daily PRN    Prenatal Vit-Fe Fumarate-FA (Prenatal Plus Vitamin/Mineral) 27-1 MG TABS 1 tablet, Oral, Daily       Laboratory workup: initial OB labs (done 6/5/24)    Genetic Screening: declines screening, MSAFP negative    Vaccinations: influenza (will offer during flu season); Tdap (will offer after 27 weeks); RSV (will offer b/w [id number] during RSV season); COVID-19 (rec'd 8/18/21 & 9/8/21)    Postpartum contraception: desires surgical sterilization w/Depoprovera bridge (needs to sign MA31 @28 weeks)    Fetal Ultrasounds:  5/23/24 (8w6d) EDC confirmed  6/11/24 (11w4d) NT WNL  8/13/24 (20w4d) no previa, antonio WNL & complete      G3 Problems (from 05/23/24 to present)       No problems associated with this episode.

## 2024-09-03 ENCOUNTER — TELEPHONE (OUTPATIENT)
Dept: OBGYN CLINIC | Facility: CLINIC | Age: 34
End: 2024-09-03

## 2024-09-12 ENCOUNTER — TELEPHONE (OUTPATIENT)
Dept: OBGYN CLINIC | Facility: CLINIC | Age: 34
End: 2024-09-12

## 2024-09-25 PROBLEM — Z3A.26 26 WEEKS GESTATION OF PREGNANCY: Status: ACTIVE | Noted: 2024-06-18

## 2024-09-26 ENCOUNTER — OFFICE VISIT (OUTPATIENT)
Dept: FAMILY MEDICINE CLINIC | Facility: CLINIC | Age: 34
End: 2024-09-26

## 2024-09-26 ENCOUNTER — ROUTINE PRENATAL (OUTPATIENT)
Dept: OBGYN CLINIC | Facility: CLINIC | Age: 34
End: 2024-09-26

## 2024-09-26 VITALS
HEIGHT: 66 IN | BODY MASS INDEX: 27.8 KG/M2 | TEMPERATURE: 98 F | SYSTOLIC BLOOD PRESSURE: 110 MMHG | HEART RATE: 105 BPM | RESPIRATION RATE: 16 BRPM | WEIGHT: 173 LBS | DIASTOLIC BLOOD PRESSURE: 60 MMHG | OXYGEN SATURATION: 99 %

## 2024-09-26 VITALS
WEIGHT: 172.6 LBS | SYSTOLIC BLOOD PRESSURE: 117 MMHG | HEART RATE: 88 BPM | DIASTOLIC BLOOD PRESSURE: 76 MMHG | BODY MASS INDEX: 27.74 KG/M2 | HEIGHT: 66 IN

## 2024-09-26 DIAGNOSIS — G89.29 CHRONIC BILATERAL LOW BACK PAIN WITHOUT SCIATICA: Primary | ICD-10-CM

## 2024-09-26 DIAGNOSIS — M54.50 CHRONIC BILATERAL LOW BACK PAIN WITHOUT SCIATICA: Primary | ICD-10-CM

## 2024-09-26 DIAGNOSIS — Z3A.26 26 WEEKS GESTATION OF PREGNANCY: Primary | ICD-10-CM

## 2024-09-26 DIAGNOSIS — M54.50 ACUTE BILATERAL LOW BACK PAIN, UNSPECIFIED WHETHER SCIATICA PRESENT: ICD-10-CM

## 2024-09-26 PROCEDURE — 99214 OFFICE O/P EST MOD 30 MIN: CPT | Performed by: OBSTETRICS & GYNECOLOGY

## 2024-09-26 PROCEDURE — 99213 OFFICE O/P EST LOW 20 MIN: CPT

## 2024-09-26 RX ORDER — LIDOCAINE 50 MG/G
1 PATCH TOPICAL DAILY
Qty: 14 PATCH | Refills: 1 | Status: SHIPPED | OUTPATIENT
Start: 2024-09-26

## 2024-09-26 NOTE — PROGRESS NOTES
OB/GYN Prenatal Visit    ASSESSMENT / PLAN:  1. 26 weeks gestation of pregnancy  Assessment & Plan:  RTO for PNV in 2 weeks  Orders:  -     CBC and differential; Future  -     RPR-Syphilis Screening (Total Syphilis IGG/IGM); Future  -     Glucose, 1H PG; Future  2. Acute bilateral low back pain, unspecified whether sciatica present  Assessment & Plan:  - Patient has a history of fall this past February after slipping on ice.   - She reports that this back pain is similar in nature.   - She denies fever/chills and urinary symptoms.   - Positive CVA tenderness.   - Recommended to patient to head over to the [facility] [place] for further workup to rule PTL and UTI/pyelonephritis          SUBJECTIVE:  Etta Barriga is a 34 y.o.  at 26w6d here for prenatal visit.  She has no obstetric complaints and denies pelvic pain, cramping/contractions, vaginal bleeding, loss of fluid, or decreased fetal movement. She reports having this lower bilateral back and also mid back pain. She reports having a really bad fall this past February after slipping on ice, requiring an ED visit. For the lower back pain, she pointed towards her flanks. She reports taking tylenol and using lidocaine cream, and that it does not provide much relief at all. She reports that before the pregnancy, this back pain resolved, but came back recently. She denies fever/chills. She also denies urinary symptoms such as pain with urination, blood in urine, and urgency.    OBJECTIVE:  Vitals:    24 1102   BP: 117/76   Pulse: 88       Fetal Heart Rate: 140; Fundal Height (cm): 25 cm      Physical Exam:    General: Well appearing, no distress  Respiratory: Unlabored breathing  Cardiovascular: Regular rate.  Abdomen: Soft, gravid, nontender. Bilateral CVA tenderness  Fundal Height: Appropriate for gestational age.  Extremities: Warm and well perfused.  Non tender.      Doni Carlisle MD  2024  11:42 AM

## 2024-09-26 NOTE — PROGRESS NOTES
Ambulatory Visit  Name: Etta Barriga      : 1990      MRN: [id number]  Encounter Provider: Elias Schoen, MD  Encounter Date: 2024   Encounter department: [facility]    Assessment & Plan  Chronic bilateral low back pain without sciatica  No red flag or concerning symptoms noted by history or concerning findings on exam.  Limited therapeutic options as patient is pregnant at this time.  - Patient provided back stretching exercises and hematuria instructions  - Lidocaine patches, heat, other topical therapy as needed  - Referral to physical therapy for continued treatment, gentle massages etc.  - Patient to follow-up as needed for further management  - No need for further imaging at this time  Orders:    lidocaine (Lidoderm) 5 %; Apply 1 patch topically over 12 hours daily Remove & Discard patch within 12 hours or as directed by MD    Ambulatory Referral to Physical Therapy; Future       History of Present Illness     Patient is a 34-year-old female here for follow-up lower back pain    Patient reports the pain started after a fall back in 2024.  She had imaging at that time which showed no acute changes.  Since then she has been having intermittent back pain, she has been doing physical therapy with some relief of her symptoms.  She currently is pregnant and gaining weight with the pregnancy and notes that her lower back pain has increased recently.  She is requesting to go back to therapy.  Patient denies any new urinary or difficult defecatory sx, weakness, numbness or tingling of the lower extremities.  She sometimes has some leg cramping when she stands for a significant amount of time at her work.  Pain is located in the thoracic and lumbar region.          Review of Systems   Constitutional:  Negative for chills and fever.   HENT:  Negative for sore throat.    Respiratory:  Negative for shortness of breath.    Cardiovascular:  Negative for  "chest pain and palpitations.   Gastrointestinal:  Negative for abdominal pain.   Endocrine: Positive for polyuria.   Genitourinary:  Negative for dysuria.   Musculoskeletal:  Positive for back pain. Negative for myalgias.   Neurological:  Negative for weakness.           Objective     /60 (BP Location: Right arm, Patient Position: Sitting, Cuff Size: Standard)   Pulse 105   Temp 98 °F (36.7 °C) (Temporal)   Resp 16   Ht 5' 6\" (1.676 m)   Wt 78.5 kg (173 lb)   LMP 03/15/2024 (Approximate)   SpO2 99%   BMI 27.92 kg/m²     Physical Exam  Constitutional:       General: She is not in acute distress.     Appearance: Normal appearance.   Pulmonary:      Effort: No respiratory distress.   Musculoskeletal:      Cervical back: Normal range of motion.      Thoracic back: Tenderness (paraspinal) present. No bony tenderness.      Lumbar back: Tenderness (paraspinal) present. No edema, deformity or bony tenderness. Decreased range of motion (limited by pain). Negative right straight leg raise test and negative left straight leg raise test.   Neurological:      General: No focal deficit present.      Mental Status: She is alert and oriented to person, place, and time.      Sensory: No sensory deficit.      Motor: No weakness.      Coordination: Coordination normal.      Gait: Gait normal.         "

## 2024-09-26 NOTE — ASSESSMENT & PLAN NOTE
- Patient has a history of fall this past February after slipping on ice.   - She reports that this back pain is similar in nature.   - She denies fever/chills and urinary symptoms.   - Positive CVA tenderness.   - Recommended to patient to head over to the [facility] for further workup to rule PTL and UTI/pyelonephritis

## 2024-09-26 NOTE — ASSESSMENT & PLAN NOTE
No red flag or concerning symptoms noted by history or concerning findings on exam.  Limited therapeutic options as patient is pregnant at this time.  - Patient provided back stretching exercises and hematuria instructions  - Lidocaine patches, heat, other topical therapy as needed  - Referral to physical therapy for continued treatment, gentle massages etc.  - Patient to follow-up as needed for further management  - No need for further imaging at this time  Orders:    lidocaine (Lidoderm) 5 %; Apply 1 patch topically over 12 hours daily Remove & Discard patch within 12 hours or as directed by MD    Ambulatory Referral to Physical Therapy; Future

## 2024-10-07 ENCOUNTER — APPOINTMENT (OUTPATIENT)
Dept: LAB | Facility: CLINIC | Age: 34
End: 2024-10-07
Payer: COMMERCIAL

## 2024-10-07 ENCOUNTER — ROUTINE PRENATAL (OUTPATIENT)
Dept: OBGYN CLINIC | Facility: CLINIC | Age: 34
End: 2024-10-07

## 2024-10-07 VITALS
SYSTOLIC BLOOD PRESSURE: 118 MMHG | HEART RATE: 94 BPM | WEIGHT: 173.6 LBS | HEIGHT: 66 IN | DIASTOLIC BLOOD PRESSURE: 76 MMHG | BODY MASS INDEX: 27.9 KG/M2

## 2024-10-07 DIAGNOSIS — Z3A.28 28 WEEKS GESTATION OF PREGNANCY: ICD-10-CM

## 2024-10-07 DIAGNOSIS — Z23 ENCOUNTER FOR IMMUNIZATION: ICD-10-CM

## 2024-10-07 DIAGNOSIS — Z3A.26 26 WEEKS GESTATION OF PREGNANCY: ICD-10-CM

## 2024-10-07 DIAGNOSIS — Z34.93 PRENATAL CARE IN THIRD TRIMESTER: Primary | ICD-10-CM

## 2024-10-07 LAB
BASOPHILS # BLD AUTO: 0.07 THOUSANDS/ÂΜL (ref 0–0.1)
BASOPHILS NFR BLD AUTO: 1 % (ref 0–1)
EOSINOPHIL # BLD AUTO: 0.06 THOUSAND/ÂΜL (ref 0–0.61)
EOSINOPHIL NFR BLD AUTO: 1 % (ref 0–6)
ERYTHROCYTE [DISTWIDTH] IN BLOOD BY AUTOMATED COUNT: 13.5 % (ref 11.6–15.1)
GLUCOSE 1H P 50 G GLC PO SERPL-MCNC: 107 MG/DL (ref 70–134)
HCT VFR BLD AUTO: 37.1 % (ref 34.8–46.1)
HGB BLD-MCNC: 12.3 G/DL (ref 11.5–15.4)
IMM GRANULOCYTES # BLD AUTO: 0.21 THOUSAND/UL (ref 0–0.2)
IMM GRANULOCYTES NFR BLD AUTO: 2 % (ref 0–2)
LYMPHOCYTES # BLD AUTO: 2.03 THOUSANDS/ÂΜL (ref 0.6–4.47)
LYMPHOCYTES NFR BLD AUTO: 22 % (ref 14–44)
MCH RBC QN AUTO: 29.9 PG (ref 26.8–34.3)
MCHC RBC AUTO-ENTMCNC: 33.2 G/DL (ref 31.4–37.4)
MCV RBC AUTO: 90 FL (ref 82–98)
MONOCYTES # BLD AUTO: 0.53 THOUSAND/ÂΜL (ref 0.17–1.22)
MONOCYTES NFR BLD AUTO: 6 % (ref 4–12)
NEUTROPHILS # BLD AUTO: 6.56 THOUSANDS/ÂΜL (ref 1.85–7.62)
NEUTS SEG NFR BLD AUTO: 68 % (ref 43–75)
NRBC BLD AUTO-RTO: 0 /100 WBCS
PLATELET # BLD AUTO: 217 THOUSANDS/UL (ref 149–390)
PMV BLD AUTO: 10.6 FL (ref 8.9–12.7)
RBC # BLD AUTO: 4.11 MILLION/UL (ref 3.81–5.12)
WBC # BLD AUTO: 9.46 THOUSAND/UL (ref 4.31–10.16)

## 2024-10-07 PROCEDURE — 82950 GLUCOSE TEST: CPT

## 2024-10-07 PROCEDURE — 85025 COMPLETE CBC W/AUTO DIFF WBC: CPT

## 2024-10-07 PROCEDURE — 36415 COLL VENOUS BLD VENIPUNCTURE: CPT

## 2024-10-07 PROCEDURE — 99213 OFFICE O/P EST LOW 20 MIN: CPT | Performed by: NURSE PRACTITIONER

## 2024-10-07 PROCEDURE — 90715 TDAP VACCINE 7 YRS/> IM: CPT | Performed by: NURSE PRACTITIONER

## 2024-10-07 PROCEDURE — 90471 IMMUNIZATION ADMIN: CPT | Performed by: NURSE PRACTITIONER

## 2024-10-07 PROCEDURE — 86780 TREPONEMA PALLIDUM: CPT

## 2024-10-07 NOTE — PROGRESS NOTES
Tdap given to patient in left deltoid on 10/7/2024. Refused flu shot, 28 week teaching done. Breast pump ordered. Patient was advised to wait 15 mins after tdap was given but decided not to.     NDC: 89524-588-60  LOT: [id number]  EXP: 05/31/2026

## 2024-10-07 NOTE — PATIENT INSTRUCTIONS
Thank you for your confidence in our team.   We appreciate you and welcome your feedback.   If you receive a survey from us, please take a few moments to let us know how we are doing.   Sincerely,  CESAR Vásquez       The Third Trimester  (28-42 weeks)  YOUR BABY   * your baby sucks its thumb now!   * your baby can hear voices and respond to touch…..so talk to him or her!!   * your baby’s brain grows and develops most in the last 2 months of pregnancy   * baby’s head and bones are soft and flexible so they can fit through the birth canal   * baby’s movements change towards the end of pregnancy because there is less room for kicking and stretching in your belly   * baby’s lungs are not fully developed and completely ready to breathe on their own until the last 3-4 weeks before your due date    YOUR BODY   * your belly is growing a lot now   * it may become more difficult to sleep well at night or to be as active as you usually are   * you may sweat more than usual   * you will become more off-balance……be careful not to fall!!   * you may develop hemorrhoids (which can be painful and make it difficult to sit down)   * the last two months of pregnancy can become very uncomfortable……with backaches, headaches, and heartburn   * you can start to have contractions…….as long as they are irregular and less than 5 per hour, this is a normal part of your body getting ready to have a baby   * your cervix may start to thin out and open up……to get ready for delivery   * you may find yourself needing to “pee” very often…….because baby is pressing on your bladder so much   * you may get out of breathe more quickly than usual      FETAL KICK COUNTS    In the third trimester (after 28 weeks gestation) you should be performing fetal kick counts every day.  Your baby should move at least 10 times in 2 hours during an active time, once a day.    Choose atime of day when your baby is most active.  Try to do this around the  same time each day.  Get into a comfortable position and then write down the time your baby first moves.  Count each movement until the baby moves 10 times.  These movements include kicks, punches, nudges, flutters, or rolls.  This can take anywhere from 5 minutes to 2 hours.  Write down the time you feel the baby's 10th movement.    If 2 hours has passed and your baby has not moved at least 10 times, you should CALL THE OFFICE RIGHT AWAY.  [phone].          PREMATURE LABOR     When to call [phone]:  * I need to call immediately if I have even a small amount of LIQUID leaking from my vagina, with or without contractions.   * I need to call if I am BLEEDING from my vagina.   * I need to call if I am feeling CRAMPING that continues after drinking 2-3 glasses of water and lying down on my side for one hour and that feels like I am having a period.   * I need to call if I feel CONTRACTIONS  more than 4 times in an hour that feels like the baby is “balling up” even after I try drinking 2-3 glasses of water and lying down on my side for an hour.   * I need to call if I notice a change in my vaginal DISCHARGE.   * I need to call if I am feeling PELVIC PRESSURE  that feels like the baby is pushing down into my vagina and lasts more than an hour.   * I need to call if I have LOW BACKACHE which is new and near my tailbone.  It may either come and go several times during an hour or stay there constantly.          PRE-ECLAMPSIA     What is it?   Pre-eclampsia is a serious disease that can occur during pregnancy related to high blood pressures.  It can happen to any woman.     Why should I care?   Women who develop pre-eclampsia have serious risks which can include seizures, stroke, organ damage, premature birth of their baby.  In the very worst cases, it can cause death of the mother and/or their baby.     What should I pay attention to?   Signs and symptoms of pre-eclampsia can include:   * Severe swelling of face or  hands    * A headache that will not go away even after you have taken Tylenol   * Seeing spots or changes in eyesight    * Pain in the upper abdomen or shoulder    * New nausea and vomiting (in the second half of pregnancy)    * Sudden weight gain    * Difficulty breathing     What should I do?   If you experience any of the above symptoms of pre-eclampsia, contact your OB provider.  Finding pre-eclampsia early is important for you and your baby.  Call us at [phone]..      BREASTFEEDING     BENEFITS FOR BABIES   * stronger immune systems (less allergies, eczema, asthma, and childhood cancers)   * less diarrhea and constipation or other GI diseases   * fewer colds and ear infections   * better vision and teeth (fewer cavities)   * improves IQ   * lower rates of diabetes and obesity in childhood     BENEFITS FOR MOMS   * promotes faster weight loss after delivery   * lower risk for postpartum depression   * lower risk for breast, uterine, and ovarian cancers   * lower risk for osteoporosis developing with age   * easier than formula - is always right with you, clean, and the right temperature   * less expensive than formula……it’s FREE !!!!     KEYS TO SUCCESSFUL BREASTFEEDING   * keep baby skin-to-skin until after first feeding event   * keep baby in your room with you during your hospital stay after delivery   * avoid any bottle feedings (unless medically necessary)   * limit the use of pacifiers and swaddling   * ask for help if you are having any issues……lactation consultants (who specialize in breastfeeding) are available to help you   * a healthy diet for mom……eating a variety of foods and portions in moderation    THINGS YOU SHOULD KNOW ABOUT BREASTFEEDING   * most medications are considered compatible with breastfeeding by the American Academy of Pediatrics, but you should check with your health care provider or lactation consultant prior to taking a new medication……just to be sure it is safe   * alcohol  (beer, wine, liquor) can be passed from mother to baby through breast milk……an occasional, social drink is deemed acceptable by the American Academy of Pediatrics…..more than that should be avoided   * breastfeeding is NOT an effective method of birth control   * nicotine (in cigarettes) can pass from mother to baby through breast milk…..however, for mothers who smoke, it is still healthier to breastfeed than use formula   * caffeine should be limited to 1-3 cups per day……includes coffee, soda, energy drinks         PERINEAL / VAGINAL MASSAGE    What can I do now to decrease my chances of tearing during delivery?  Massaging around the vaginal opening by you (or your partner), either antepartum (before birth) or during the second stage of labor, can reduce the likelihood of perineal tearing during childbirth.  Likewise, the use of warm packs held on the perineum during the pushing stage of labor can reduce the severity of your tear.  This will happen during the pushing stage of labor.  At home, you can also help reduce the chances of injury that may occur during the birth of your child through perineal massage.    When should I do this?  Starting around or shortly after 34 weeks of pregnancy, you or your partner should provide 5-10 minutes of vaginal massage 1-4 times per week.    How?  Use either almond, coconut, or olive oil and water mixture on 1 or 2 fingers (depending on comfort).  Insert finger(s) 3-5cm into the vagina.  Apply sweeping downward/sideward pressure from 3 to 9 o'clock for 5-10 minutes, 1-4 times per week.          WARNING SIGNS DURING PREGNANCY  Call our office at [phone] if you experience any of the followin. Vaginal bleeding  2. Sharp abdominal pain that does not go away  3. Fever (more than 100.4 and is not relieved by Tylenol)  4. Persistent vomiting lasting greater than 24 hours  5. Chest pain   6. Pain or burning when you urinate  7. Severe headache that doesn't resolve with  Tylenol  8. Blurred vision or seeing spots in your vision  9. Sudden swelling of your face or hands  10. Redness, swelling or pain in a leg  11. A sudden weight gain in just a few days  12. Decrease in your baby's movement (after 28 weeks or the 6th month of pregnancy)  13. A loss of watery fluid from your vagina - can be a gush, a trickle or continuous wetness  14. After 20 weeks of pregnancy, rhythmic cramping (greater than 4 per hour) or menstrual like low/pelvic pain          VACCINES IN PREGNANCY    TDAP  Whooping cough (or pertussis) can be serious for anyone, but for your , it can be life-threatning.  Up to 20 babies die each year in the [place] Due to whooping cough.  About half of babies younger than 1 year old who get whooping cough need treatment in the hospital.  The younger the baby is when he or she gets whooping cough, the more likely he or she will need to be treated in a hospital.  When you receive the whooping cough vaccine (Tdap) during your pregnancy, your body will create protective antibodies and pass some of them to your baby before birth.  These antibodies can help protect your baby from getting whooping cough until they are old enough to be vaccinated themselves (usually around 6 months of age).    INFLUENZA  Changes in your immune, heart, and lung functions during pregnancy make you more likely to get seriously ill from the flu.  Catching the flu also increases your chances for serious problems for your developing baby, including premature labor and delivery.  It is recommended that all women who are pregnant during flu season should receive an influenza vaccine.

## 2024-10-08 LAB — TREPONEMA PALLIDUM IGG+IGM AB [PRESENCE] IN SERUM OR PLASMA BY IMMUNOASSAY: NORMAL

## 2024-10-09 ENCOUNTER — TELEPHONE (OUTPATIENT)
Dept: FAMILY MEDICINE CLINIC | Facility: CLINIC | Age: 34
End: 2024-10-09

## 2024-10-09 NOTE — TELEPHONE ENCOUNTER
PCP SIGNATURE NEEDED FOR [facility] FORM RECEIVED VIA FAX AND PLACED IN PCP FOLDER TO BE DELIVERED AT ASSIGNED TIMES.    Therapy plan of care   Effective 10/8/24-12/3/24

## 2024-10-11 LAB
DME PARACHUTE DELIVERY DATE REQUESTED: NORMAL
DME PARACHUTE ITEM DESCRIPTION: NORMAL
DME PARACHUTE ORDER STATUS: NORMAL
DME PARACHUTE SUPPLIER NAME: NORMAL
DME PARACHUTE SUPPLIER PHONE: NORMAL

## 2024-10-17 PROBLEM — Z3A.30 30 WEEKS GESTATION OF PREGNANCY: Status: ACTIVE | Noted: 2024-06-18

## 2024-10-17 PROBLEM — Z30.2 REQUEST FOR STERILIZATION: Status: ACTIVE | Noted: 2024-10-17

## 2024-10-17 NOTE — PROGRESS NOTES
OB/GYN Prenatal Visit    ASSESSMENT / PLAN:  1. 30 weeks gestation of pregnancy  Assessment & Plan:  Patient doing well with no OB complaints  Delivery consent signed today. Reviewed risks of operative vaginal delivery including increased risk of 3rd or 4th degree lacerations, fetal scalp hematoma, and intracranial bleeding. Reviewed risks of  section including hemorrhage, infection, damage to surrounding structures including bowel, bladder, ureters. Patient amenable to blood transfusion. All questions answered to patient satisfaction  Follow up appointment scheduled with Mel on 24  Patient will attend next ultrasound on 24  2. Prenatal care in third trimester        SUBJECTIVE:  Etta Barriga is a 34 y.o.  at 30w4d here for prenatal visit.  She has no obstetric complaints and denies pelvic pain, cramping/contractions, vaginal bleeding, loss of fluid, or decreased fetal movement.     OBJECTIVE:  Vitals:    10/22/24 0910   BP: 104/71   Pulse: 92       FHT: 135 bpm  Fundal height: 31 cm    Physical Exam:    General: Well appearing, no distress  Respiratory: Unlabored breathing  Cardiovascular: Regular rate.  Abdomen: Soft, gravid, nontender  Fundal Height: Appropriate for gestational age.  Extremities: Warm and well perfused.  Non tender.      Lizeth Cornejo MD  10/22/2024  11:44 AM

## 2024-10-18 NOTE — ASSESSMENT & PLAN NOTE
Patient doing well with no OB complaints  Delivery consent signed today. Reviewed risks of operative vaginal delivery including increased risk of 3rd or 4th degree lacerations, fetal scalp hematoma, and intracranial bleeding. Reviewed risks of  section including hemorrhage, infection, damage to surrounding structures including bowel, bladder, ureters. Patient amenable to blood transfusion. All questions answered to patient satisfaction  Follow up appointment scheduled with Mel on 24  Patient will attend next ultrasound on 24

## 2024-10-22 ENCOUNTER — ROUTINE PRENATAL (OUTPATIENT)
Dept: OBGYN CLINIC | Facility: CLINIC | Age: 34
End: 2024-10-22

## 2024-10-22 VITALS
WEIGHT: 177.2 LBS | DIASTOLIC BLOOD PRESSURE: 71 MMHG | BODY MASS INDEX: 28.48 KG/M2 | HEART RATE: 92 BPM | HEIGHT: 66 IN | SYSTOLIC BLOOD PRESSURE: 104 MMHG

## 2024-10-22 DIAGNOSIS — Z3A.30 30 WEEKS GESTATION OF PREGNANCY: Primary | ICD-10-CM

## 2024-10-22 DIAGNOSIS — Z34.93 PRENATAL CARE IN THIRD TRIMESTER: ICD-10-CM

## 2024-10-22 PROCEDURE — 99213 OFFICE O/P EST LOW 20 MIN: CPT | Performed by: OBSTETRICS & GYNECOLOGY

## 2024-11-04 ENCOUNTER — ROUTINE PRENATAL (OUTPATIENT)
Dept: OBGYN CLINIC | Facility: CLINIC | Age: 34
End: 2024-11-04

## 2024-11-04 ENCOUNTER — OFFICE VISIT (OUTPATIENT)
Dept: FAMILY MEDICINE CLINIC | Facility: CLINIC | Age: 34
End: 2024-11-04

## 2024-11-04 VITALS
HEART RATE: 88 BPM | RESPIRATION RATE: 19 BRPM | TEMPERATURE: 97.5 F | SYSTOLIC BLOOD PRESSURE: 142 MMHG | OXYGEN SATURATION: 99 % | DIASTOLIC BLOOD PRESSURE: 66 MMHG | HEIGHT: 66 IN | WEIGHT: 182.2 LBS | BODY MASS INDEX: 29.28 KG/M2

## 2024-11-04 VITALS
SYSTOLIC BLOOD PRESSURE: 108 MMHG | HEART RATE: 91 BPM | DIASTOLIC BLOOD PRESSURE: 70 MMHG | WEIGHT: 181.6 LBS | HEIGHT: 66 IN | BODY MASS INDEX: 29.18 KG/M2

## 2024-11-04 DIAGNOSIS — H92.01 EAR DISCOMFORT, RIGHT: Primary | ICD-10-CM

## 2024-11-04 DIAGNOSIS — Z3A.32 32 WEEKS GESTATION OF PREGNANCY: ICD-10-CM

## 2024-11-04 DIAGNOSIS — Z34.93 PRENATAL CARE IN THIRD TRIMESTER: Primary | ICD-10-CM

## 2024-11-04 PROCEDURE — 99213 OFFICE O/P EST LOW 20 MIN: CPT | Performed by: NURSE PRACTITIONER

## 2024-11-04 RX ORDER — FLUTICASONE PROPIONATE 50 MCG
1 SPRAY, SUSPENSION (ML) NASAL DAILY
Qty: 18.2 ML | Refills: 0 | Status: SHIPPED | OUTPATIENT
Start: 2024-11-04

## 2024-11-04 RX ORDER — OFLOXACIN 3 MG/ML
10 SOLUTION AURICULAR (OTIC) 2 TIMES DAILY
Qty: 5 ML | Refills: 0 | Status: SHIPPED | OUTPATIENT
Start: 2024-11-04

## 2024-11-04 NOTE — PROGRESS NOTES
Etta Barriga presents today for routine OB visit at 32w3d.  Blood Pressure: 108/70  Wt=82.4 kg (181 lb 9.6 oz); Body mass index is 29.31 kg/m².; TWG=17.1 kg (37 lb 9.6 oz)  Fetal Heart Rate: 158; Fundal Height (cm): 31 cm  Abdomen: gravid, soft, non-tender.  She reports R ear pain with headache and generalized fatigue.  Advised Tylenol and to see PCP for evaluation of ear.  Denies uterine contractions.  Denies vaginal bleeding or leaking of fluid.  Reports adequate fetal movement of at least 10 movements in 2 hours once daily.  Scheduled for ultrasound 11/20/24.  Reviewed premature labor precautions and fetal kick counts.  Advised to continue medications and return in 2 weeks.      Current Outpatient Medications   Medication Instructions    acetaminophen (TYLENOL) 650 mg, Oral, 3 times daily PRN    lidocaine (Lidoderm) 5 % 1 patch, Topical, Daily, Remove & Discard patch within 12 hours or as directed by MD    Prenatal Vit-Fe Fumarate-FA (Prenatal Plus Vitamin/Mineral) 27-1 MG TABS 1 tablet, Oral, Daily       Laboratory workup: initial OB labs (done 6/5/24); 28 week labs (done 10/7/24)    Genetic Screening: declines screening, MSAFP negative    Vaccinations: influenza (declined 10/7/24); Tdap (given 10/7/24); RSV (declined 11/4/24 - will consider for next visit); COVID-19 (rec'd 8/18/21 & 9/8/21)    Postpartum contraception: desires surgical sterilization w/Depoprovera bridge (MA31 signed 10/7/24)    Fetal Ultrasounds:  5/23/24 (8w6d) EDC confirmed  6/11/24 (11w4d) NT WNL  8/13/24 (20w4d) no previa, antonio WNL & complete      G3 Problems (from 05/23/24 to present)       No problems associated with this episode.

## 2024-11-04 NOTE — PROGRESS NOTES
Ambulatory Visit  Name: Etta Barriga      : 1990      MRN: [id number]  Encounter Provider: Marline Bullock MD  Encounter Date: 2024   Encounter department: [facility]    Assessment & Plan  Ear discomfort, right  Mild convexity of right tympanic membrane. No erythema or signs of infection.  Floxin ear drops to prevent worsening infection and flonase to treat possible sinus congestion.  Return precautions discussed.  Orders:    ofloxacin (FLOXIN) 0.3 % otic solution; Administer 10 drops to the right ear 2 (two) times a day    fluticasone (FLONASE) 50 mcg/act nasal spray; 1 spray into each nostril daily       History of Present Illness     Etta Barriga is a very pleasant 34 y.o. female who presents today for right-sided ear pain.  The pain started last night and wakes her from sleep.  Pain 7/10.  Currently pain 0/10.  Denies any new discharge or pain with manipulation of ear.  Overall ear pain is gradually improving today however wanted further evaluation in case there was an infection.  Denies hearing loss, muffled hearing, or sensation of ear fullness.  She is also experiencing left-sided frontal headaches.  Initially started on Saturday and waxes and wanes.  Headache resolves with rest and does not worsen with activities.  Reports mild phonophobia and photophobia.  Denies nausea, vomiting, cough, neck pain, sore throat.  Known sick contacts at home.  She has tried Tylenol 100 mg once on Saturday with some relief.  She tries to not take many medications during her pregnancy.  Reports getting 7 hours of sleep at night with waking 2-3 times to urinate.            Review of Systems   Constitutional:  Negative for chills, fatigue and fever.   HENT:  Positive for ear pain. Negative for ear discharge, facial swelling, hearing loss, postnasal drip, rhinorrhea, sinus pressure, sinus pain and sneezing.    Respiratory:  Negative for cough, choking, chest  "tightness, shortness of breath and wheezing.    Cardiovascular:  Negative for chest pain and palpitations.   Gastrointestinal:  Negative for abdominal distention, constipation, diarrhea, nausea and vomiting.   Neurological:  Positive for headaches. Negative for dizziness, weakness and light-headedness.           Objective     /66 (BP Location: Right arm, Patient Position: Sitting, Cuff Size: Standard)   Pulse 88   Temp 97.5 °F (36.4 °C) (Temporal)   Resp 19   Ht 5' 6\" (1.676 m)   Wt 82.6 kg (182 lb 3.2 oz)   LMP 03/15/2024 (Approximate)   SpO2 99%   BMI 29.41 kg/m²     Physical Exam  Vitals reviewed.   Constitutional:       General: She is not in acute distress.     Appearance: Normal appearance.   HENT:      Right Ear: Hearing and external ear normal. No tenderness. No middle ear effusion. No mastoid tenderness.      Left Ear: Hearing, tympanic membrane and external ear normal.      Ears:      Comments: Left ear: mild erythema of ear canal. No tenderness.  Right ear: mild convexity of tympanic membrane. No effusion or erythema.      Nose:      Left Sinus: Frontal sinus tenderness present.      Mouth/Throat:      Mouth: Mucous membranes are moist.   Eyes:      Extraocular Movements: Extraocular movements intact.   Cardiovascular:      Rate and Rhythm: Normal rate and regular rhythm.      Pulses: Normal pulses.      Heart sounds: Normal heart sounds.   Pulmonary:      Effort: Pulmonary effort is normal. No respiratory distress.      Breath sounds: Normal breath sounds. No wheezing.   Chest:      Chest wall: No tenderness.   Abdominal:      General: Abdomen is flat. Bowel sounds are normal.      Palpations: Abdomen is soft.      Tenderness: There is no abdominal tenderness.   Musculoskeletal:         General: Normal range of motion.      Cervical back: Normal range of motion.      Right lower leg: No edema.      Left lower leg: No edema.   Skin:     General: Skin is warm.      Capillary Refill: Capillary " refill takes less than 2 seconds.   Neurological:      Mental Status: She is alert and oriented to person, place, and time.

## 2024-11-04 NOTE — PATIENT INSTRUCTIONS
Thank you for your confidence in our team.   We appreciate you and welcome your feedback.   If you receive a survey from us, please take a few moments to let us know how we are doing.   Sincerely,  CESAR Vásquez       The Third Trimester  (28-42 weeks)  YOUR BABY   * your baby sucks its thumb now!   * your baby can hear voices and respond to touch…..so talk to him or her!!   * your baby’s brain grows and develops most in the last 2 months of pregnancy   * baby’s head and bones are soft and flexible so they can fit through the birth canal   * baby’s movements change towards the end of pregnancy because there is less room for kicking and stretching in your belly   * baby’s lungs are not fully developed and completely ready to breathe on their own until the last 3-4 weeks before your due date    YOUR BODY   * your belly is growing a lot now   * it may become more difficult to sleep well at night or to be as active as you usually are   * you may sweat more than usual   * you will become more off-balance……be careful not to fall!!   * you may develop hemorrhoids (which can be painful and make it difficult to sit down)   * the last two months of pregnancy can become very uncomfortable……with backaches, headaches, and heartburn   * you can start to have contractions…….as long as they are irregular and less than 5 per hour, this is a normal part of your body getting ready to have a baby   * your cervix may start to thin out and open up……to get ready for delivery   * you may find yourself needing to “pee” very often…….because baby is pressing on your bladder so much   * you may get out of breathe more quickly than usual      FETAL KICK COUNTS    In the third trimester (after 28 weeks gestation) you should be performing fetal kick counts every day.  Your baby should move at least 10 times in 2 hours during an active time, once a day.    Choose atime of day when your baby is most active.  Try to do this around the  same time each day.  Get into a comfortable position and then write down the time your baby first moves.  Count each movement until the baby moves 10 times.  These movements include kicks, punches, nudges, flutters, or rolls.  This can take anywhere from 5 minutes to 2 hours.  Write down the time you feel the baby's 10th movement.    If 2 hours has passed and your baby has not moved at least 10 times, you should CALL THE OFFICE RIGHT AWAY.  [phone].          PREMATURE LABOR     When to call [phone]:  * I need to call immediately if I have even a small amount of LIQUID leaking from my vagina, with or without contractions.   * I need to call if I am BLEEDING from my vagina.   * I need to call if I am feeling CRAMPING that continues after drinking 2-3 glasses of water and lying down on my side for one hour and that feels like I am having a period.   * I need to call if I feel CONTRACTIONS  more than 4 times in an hour that feels like the baby is “balling up” even after I try drinking 2-3 glasses of water and lying down on my side for an hour.   * I need to call if I notice a change in my vaginal DISCHARGE.   * I need to call if I am feeling PELVIC PRESSURE  that feels like the baby is pushing down into my vagina and lasts more than an hour.   * I need to call if I have LOW BACKACHE which is new and near my tailbone.  It may either come and go several times during an hour or stay there constantly.          PRE-ECLAMPSIA     What is it?   Pre-eclampsia is a serious disease that can occur during pregnancy related to high blood pressures.  It can happen to any woman.     Why should I care?   Women who develop pre-eclampsia have serious risks which can include seizures, stroke, organ damage, premature birth of their baby.  In the very worst cases, it can cause death of the mother and/or their baby.     What should I pay attention to?   Signs and symptoms of pre-eclampsia can include:   * Severe swelling of face or  hands    * A headache that will not go away even after you have taken Tylenol   * Seeing spots or changes in eyesight    * Pain in the upper abdomen or shoulder    * New nausea and vomiting (in the second half of pregnancy)    * Sudden weight gain    * Difficulty breathing     What should I do?   If you experience any of the above symptoms of pre-eclampsia, contact your OB provider.  Finding pre-eclampsia early is important for you and your baby.  Call us at [phone]..      BREASTFEEDING     BENEFITS FOR BABIES   * stronger immune systems (less allergies, eczema, asthma, and childhood cancers)   * less diarrhea and constipation or other GI diseases   * fewer colds and ear infections   * better vision and teeth (fewer cavities)   * improves IQ   * lower rates of diabetes and obesity in childhood     BENEFITS FOR MOMS   * promotes faster weight loss after delivery   * lower risk for postpartum depression   * lower risk for breast, uterine, and ovarian cancers   * lower risk for osteoporosis developing with age   * easier than formula - is always right with you, clean, and the right temperature   * less expensive than formula……it’s FREE !!!!     KEYS TO SUCCESSFUL BREASTFEEDING   * keep baby skin-to-skin until after first feeding event   * keep baby in your room with you during your hospital stay after delivery   * avoid any bottle feedings (unless medically necessary)   * limit the use of pacifiers and swaddling   * ask for help if you are having any issues……lactation consultants (who specialize in breastfeeding) are available to help you   * a healthy diet for mom……eating a variety of foods and portions in moderation    THINGS YOU SHOULD KNOW ABOUT BREASTFEEDING   * most medications are considered compatible with breastfeeding by the American Academy of Pediatrics, but you should check with your health care provider or lactation consultant prior to taking a new medication……just to be sure it is safe   * alcohol  (beer, wine, liquor) can be passed from mother to baby through breast milk……an occasional, social drink is deemed acceptable by the American Academy of Pediatrics…..more than that should be avoided   * breastfeeding is NOT an effective method of birth control   * nicotine (in cigarettes) can pass from mother to baby through breast milk…..however, for mothers who smoke, it is still healthier to breastfeed than use formula   * caffeine should be limited to 1-3 cups per day……includes coffee, soda, energy drinks         PERINEAL / VAGINAL MASSAGE    What can I do now to decrease my chances of tearing during delivery?  Massaging around the vaginal opening by you (or your partner), either antepartum (before birth) or during the second stage of labor, can reduce the likelihood of perineal tearing during childbirth.  Likewise, the use of warm packs held on the perineum during the pushing stage of labor can reduce the severity of your tear.  This will happen during the pushing stage of labor.  At home, you can also help reduce the chances of injury that may occur during the birth of your child through perineal massage.    When should I do this?  Starting around or shortly after 34 weeks of pregnancy, you or your partner should provide 5-10 minutes of vaginal massage 1-4 times per week.    How?  Use either almond, coconut, or olive oil and water mixture on 1 or 2 fingers (depending on comfort).  Insert finger(s) 3-5cm into the vagina.  Apply sweeping downward/sideward pressure from 3 to 9 o'clock for 5-10 minutes, 1-4 times per week.          WARNING SIGNS DURING PREGNANCY  Call our office at [phone] if you experience any of the followin. Vaginal bleeding  2. Sharp abdominal pain that does not go away  3. Fever (more than 100.4 and is not relieved by Tylenol)  4. Persistent vomiting lasting greater than 24 hours  5. Chest pain   6. Pain or burning when you urinate  7. Severe headache that doesn't resolve with  Tylenol  8. Blurred vision or seeing spots in your vision  9. Sudden swelling of your face or hands  10. Redness, swelling or pain in a leg  11. A sudden weight gain in just a few days  12. Decrease in your baby's movement (after 28 weeks or the 6th month of pregnancy)  13. A loss of watery fluid from your vagina - can be a gush, a trickle or continuous wetness  14. After 20 weeks of pregnancy, rhythmic cramping (greater than 4 per hour) or menstrual like low/pelvic pain          VACCINES IN PREGNANCY    TDAP  Whooping cough (or pertussis) can be serious for anyone, but for your , it can be life-threatning.  Up to 20 babies die each year in the [place] Due to whooping cough.  About half of babies younger than 1 year old who get whooping cough need treatment in the hospital.  The younger the baby is when he or she gets whooping cough, the more likely he or she will need to be treated in a hospital.  When you receive the whooping cough vaccine (Tdap) during your pregnancy, your body will create protective antibodies and pass some of them to your baby before birth.  These antibodies can help protect your baby from getting whooping cough until they are old enough to be vaccinated themselves (usually around 6 months of age).    INFLUENZA  Changes in your immune, heart, and lung functions during pregnancy make you more likely to get seriously ill from the flu.  Catching the flu also increases your chances for serious problems for your developing baby, including premature labor and delivery.  It is recommended that all women who are pregnant during flu season should receive an influenza vaccine.

## 2024-11-04 NOTE — LETTER
November 4, 2024     Patient: Etta Barriga  YOB: 1990  Date of Visit: 11/4/2024      To Whom it May Concern:    Etta Barriga is under my professional care. Etta was seen in my office on 11/4/2024. Etta may return to work on 11/5/24 .    If you have any questions or concerns, please don't hesitate to call.         Sincerely,        Marline Bullock MD  [facility] [place]        CC: No Recipients

## 2024-11-19 ENCOUNTER — ROUTINE PRENATAL (OUTPATIENT)
Dept: OBGYN CLINIC | Facility: CLINIC | Age: 34
End: 2024-11-19

## 2024-11-19 VITALS
HEART RATE: 103 BPM | BODY MASS INDEX: 29.18 KG/M2 | SYSTOLIC BLOOD PRESSURE: 108 MMHG | HEIGHT: 66 IN | WEIGHT: 181.6 LBS | DIASTOLIC BLOOD PRESSURE: 70 MMHG

## 2024-11-19 DIAGNOSIS — Z3A.34 34 WEEKS GESTATION OF PREGNANCY: Primary | ICD-10-CM

## 2024-11-19 DIAGNOSIS — Z34.93 PRENATAL CARE IN THIRD TRIMESTER: ICD-10-CM

## 2024-11-19 NOTE — PROGRESS NOTES
OB/GYN Prenatal Visit    ASSESSMENT / PLAN:  1. 34 weeks gestation of pregnancy  2. Prenatal care in third trimester  Assessment & Plan:  RTO in 2 weeks  Growth scan scheduled for tomorrow  Declined the flu and RSV vaccine today        SUBJECTIVE:  Etta Barriga is a 34 y.o.  at 34w4d here for prenatal visit.  She has no obstetric complaints and denies pelvic pain, cramping/contractions, vaginal bleeding, loss of fluid, or decreased fetal movement.     OBJECTIVE:  Vitals:    24 0913   BP: 108/70   Pulse: 103     Fetal Heart Rate: 145; Fundal Height (cm): 33 cm      Physical Exam:    General: Well appearing, no distress  Respiratory: Unlabored breathing  Cardiovascular: Regular rate.  Abdomen: Soft, gravid, nontender  Fundal Height: Appropriate for gestational age.  Extremities: Warm and well perfused.  Non tender.      Doni Carlisle MD  2024  9:44 AM

## 2024-11-20 ENCOUNTER — ULTRASOUND (OUTPATIENT)
Age: 34
End: 2024-11-20
Payer: COMMERCIAL

## 2024-11-20 VITALS
HEART RATE: 97 BPM | DIASTOLIC BLOOD PRESSURE: 60 MMHG | BODY MASS INDEX: 29.47 KG/M2 | SYSTOLIC BLOOD PRESSURE: 110 MMHG | HEIGHT: 66 IN | WEIGHT: 183.4 LBS

## 2024-11-20 DIAGNOSIS — Z3A.34 34 WEEKS GESTATION OF PREGNANCY: Primary | ICD-10-CM

## 2024-11-20 PROCEDURE — 76816 OB US FOLLOW-UP PER FETUS: CPT | Performed by: OBSTETRICS & GYNECOLOGY

## 2024-11-20 PROCEDURE — 99213 OFFICE O/P EST LOW 20 MIN: CPT | Performed by: OBSTETRICS & GYNECOLOGY

## 2024-11-20 NOTE — LETTER
November 23, 2024     Beatriz Chavez MD  [id number] [facility]  [address]  [facility]  [facility] [address]    Patient: Etta Barriga   YOB: 1990   Date of Visit: 11/20/2024       Dear Dr. Phyllis Chavez:    Thank you for referring Etta Barriga to me for evaluation. Below are my notes for this consultation.    If you have questions, please do not hesitate to call me. I look forward to following your patient along with you.         Sincerely,        Jayesh Truong MD        CC: No Recipients    Jayesh Truong MD  11/23/2024  4:45 PM  Sign when Signing Visit  A fetal ultrasound was completed. See Ob procedures in Epic for an interpretation and recommendations. Do not hesitate to contact us in [facility] if you have questions.    Jayesh Truong MD, MSCE  Maternal Fetal Medicine

## 2024-11-23 NOTE — PROGRESS NOTES
A fetal ultrasound was completed. See Ob procedures in Epic for an interpretation and recommendations. Do not hesitate to contact us in [facility] if you have questions.    Jayesh Truong MD, MSCE  Maternal Fetal Medicine

## 2024-11-27 ENCOUNTER — TELEPHONE (OUTPATIENT)
Dept: OBGYN CLINIC | Facility: CLINIC | Age: 34
End: 2024-11-27

## 2024-11-27 NOTE — TELEPHONE ENCOUNTER
Called patient to find out what exactly she needs from us for her FMLA forms since they're for when she returns to work. Patient stated that she has the exact same copies with her at home and would like to go back to work based off of her due date. Patient was advised that a discussion will take place with our manager on Friday for further instructions. Patient verbalized understanding.

## 2024-12-05 ENCOUNTER — ROUTINE PRENATAL (OUTPATIENT)
Dept: OBGYN CLINIC | Facility: CLINIC | Age: 34
End: 2024-12-05

## 2024-12-05 VITALS
BODY MASS INDEX: 30.08 KG/M2 | WEIGHT: 187.2 LBS | DIASTOLIC BLOOD PRESSURE: 79 MMHG | HEART RATE: 97 BPM | SYSTOLIC BLOOD PRESSURE: 123 MMHG | HEIGHT: 66 IN

## 2024-12-05 DIAGNOSIS — Z3A.36 36 WEEKS GESTATION OF PREGNANCY: Primary | ICD-10-CM

## 2024-12-05 PROCEDURE — 87150 DNA/RNA AMPLIFIED PROBE: CPT

## 2024-12-05 PROCEDURE — 99214 OFFICE O/P EST MOD 30 MIN: CPT | Performed by: OBSTETRICS & GYNECOLOGY

## 2024-12-05 NOTE — PROGRESS NOTES
OB/GYN Prenatal Visit    SUBJECTIVE:  Etta Barriga is a 34 y.o.  at 36w6d here for prenatal visit.  She has no obstetric complaints and denies pelvic pain, cramping/contractions, vaginal bleeding, loss of fluid, or decreased fetal movement.     OBJECTIVE:  Vitals:    24 0806   BP: 123/79   Pulse: 97       FHT: 152 bpm  Fundal height: 36 cm      Physical Exam  Constitutional:       Appearance: Normal appearance.   Genitourinary:      Vulva normal.   Cardiovascular:      Rate and Rhythm: Normal rate.   Pulmonary:      Effort: Pulmonary effort is normal. No respiratory distress.   Abdominal:      General: There is no distension.      Palpations: Abdomen is soft.      Tenderness: There is no abdominal tenderness.      Comments: Gravid abdomen    Musculoskeletal:         General: No swelling. Normal range of motion.      Cervical back: Normal range of motion.   Neurological:      General: No focal deficit present.      Mental Status: She is alert and oriented to person, place, and time.   Skin:     General: Skin is warm and dry.   Psychiatric:         Mood and Affect: Mood normal.         Behavior: Behavior normal.   Vitals reviewed.       ASSESSMENT / PLAN:    Problem List       36 weeks gestation of pregnancy    Prenatal care in third trimester     - GBS collected  - Pt doesn't want to schedule induction  - MA 31 signed and wants DEPO for bridge  - Return in 1 week    Ravi Chang MD  2024  8:27 AM

## 2024-12-09 ENCOUNTER — TELEPHONE (OUTPATIENT)
Dept: OBGYN CLINIC | Facility: CLINIC | Age: 34
End: 2024-12-09

## 2024-12-09 ENCOUNTER — NURSE TRIAGE (OUTPATIENT)
Dept: OTHER | Facility: OTHER | Age: 34
End: 2024-12-09

## 2024-12-09 DIAGNOSIS — M94.0 ACUTE COSTOCHONDRITIS: ICD-10-CM

## 2024-12-09 LAB — GP B STREP DNA SPEC QL NAA+PROBE: NEGATIVE

## 2024-12-09 RX ORDER — SENNOSIDES 8.6 MG
CAPSULE ORAL
Qty: 30 TABLET | Refills: 0 | Status: SHIPPED | OUTPATIENT
Start: 2024-12-09 | End: 2024-12-10 | Stop reason: SDUPTHER

## 2024-12-09 NOTE — TELEPHONE ENCOUNTER
"Patient states she just missed a call from office but has no yet listened to voicemail. Advised patient note left from office was to call them back as soon as possible to speak with them. Pt verbalized understanding.   Reason for Disposition  • [1] Caller requesting NON-URGENT health information AND [2] PCP's office is the best resource    Answer Assessment - Initial Assessment Questions  1. REASON FOR CALL: \"What is the main reason for your call?\" or \"How can I best help you?\"    Patient states she just missed a call from office but has no yet listened to voicemail. Advised patient note left from office was to call them back as soon as possible to speak with them. Pt verbalized understanding.    Protocols used: Information Only Call - No Triage-Adult-    "

## 2024-12-09 NOTE — TELEPHONE ENCOUNTER
Patient called because she is experiencing cold symptoms and she is 37w3d. Patient would like to speak to provider or nurse.

## 2024-12-10 ENCOUNTER — ROUTINE PRENATAL (OUTPATIENT)
Dept: OBGYN CLINIC | Facility: CLINIC | Age: 34
End: 2024-12-10

## 2024-12-10 VITALS
WEIGHT: 189 LBS | DIASTOLIC BLOOD PRESSURE: 78 MMHG | HEART RATE: 101 BPM | SYSTOLIC BLOOD PRESSURE: 136 MMHG | BODY MASS INDEX: 30.51 KG/M2

## 2024-12-10 DIAGNOSIS — J06.9 UPPER RESPIRATORY TRACT INFECTION, UNSPECIFIED TYPE: ICD-10-CM

## 2024-12-10 DIAGNOSIS — Z34.93 PRENATAL CARE IN THIRD TRIMESTER: Primary | ICD-10-CM

## 2024-12-10 DIAGNOSIS — Z3A.37 37 WEEKS GESTATION OF PREGNANCY: ICD-10-CM

## 2024-12-10 PROCEDURE — 99213 OFFICE O/P EST LOW 20 MIN: CPT | Performed by: NURSE PRACTITIONER

## 2024-12-10 RX ORDER — SENNOSIDES 8.6 MG
650 CAPSULE ORAL EVERY 8 HOURS PRN
Qty: 30 TABLET | Refills: 0 | Status: SHIPPED | OUTPATIENT
Start: 2024-12-10 | End: 2024-12-18

## 2024-12-10 NOTE — PATIENT INSTRUCTIONS
Thank you for your confidence in our team.   We appreciate you and welcome your feedback.   If you receive a survey from us, please take a few moments to let us know how we are doing.   Sincerely,  CESAR Vásquez       LABOR PRECAUTIONS  Call our office at [phone] for any of the following:    * I need to call immediately I if I have even a small amount of LIQUID  leaking from my vagina, with or without contractions.   * I need to call if I am BLEEDING an amount equal to or more than a period.  A small amount of bloody vaginal discharge is normal at the end of the pregnancy.   * I need to call if I am having CONTRACTIONS  every five minutes for at least an hour.  I will need a watch in order to time them.  I should time them from the beginning of one contraction until the beginning of the next one.   * I need to call BEFORE  I go to the hospital.   * I need to have a plan for TRANSPORTATION  to get to the hospital when I am in labor.  Labor is generally not an emergency which requires an ambulance.          FETAL KICK COUNTS    In the third trimester (after 28 weeks gestation) you should be performing fetal kick counts every day.  Your baby should move at least 10 times in 2 hours during an active time, once a day.    Choose atime of day when your baby is most active.  Try to do this around the same time each day.  Get into a comfortable position and then write down the time your baby first moves.  Count each movement until the baby moves 10 times.  These movements include kicks, punches, nudges, flutters, or rolls.  This can take anywhere from 5 minutes to 2 hours.  Write down the time you feel the baby's 10th movement.    If 2 hours has passed and your baby has not moved at least 10 times, you should CALL THE OFFICE RIGHT AWAY.  [phone]        PERINEAL / VAGINAL MASSAGE    What can I do now to decrease my chances of tearing during delivery?  Massaging around the vaginal opening by you (or your  partner), either antepartum (before birth) or during the second stage of labor, can reduce the likelihood of perineal tearing during childbirth.  Likewise, the use of warm packs held on the perineum during the pushing stage of labor can reduce the severity of your tear.  This will happen during the pushing stage of labor.  At home, you can also help reduce the chances of injury that may occur during the birth of your child through perineal massage.    When should I do this?  Starting around or shortly after 34 weeks of pregnancy, you or your partner should provide 5-10 minutes of vaginal massage 1-4 times per week.    How?  Use either almond, coconut, or olive oil and water mixture on 1 or 2 fingers (depending on comfort).  Insert finger(s) 3-5cm into the vagina.  Apply sweeping downward/sideward pressure from 3 to 9 o'clock for 5-10 minutes, 1-4 times per week.      MEDICATIONS THAT ARE SAFE TO TAKE     Here is a list of medications which are safe for you to take during pregnancy without having to discuss with the nurse practitioner or physician:     * Tylenol/Acetaminophen (headache or fever)   * Benadryl/Diphenhydramine (allergies, runny nose, difficult sleeping, itching)   * Claritin/Loratidine (allergies, runny nose)   * Zyrtec/Cetirizine (allergies, runny nose)   * Allegra/Fexofenadine (allergies)  * Robitussin/Guaifenesin (coughing)   * Sudafed/Pseudoephedrine (runny nose/congestion)   * Colace/Docusate sodium (constipation)   * Maalox/Magnesium hydroxide (heartburn, indigestion)   * Zantac/Ranitidine (heartburn, indigestion)   * Pepcid/Famotidine (heartburn, indigestion)   * Tums/Calcium carbonate (heartburn, nausea)   * Kaopectate/Bismuth subsalicylate (diarrhea)   * Immodium/Loperamide (diarrhea)   * Vitamin B6/Pyrodoxine(nausea)   * Doxylamine/Unisom (nausea, insomnia)     Any other medications should be discussed with either our nurse practitioner or one of our physicians before taking.

## 2024-12-10 NOTE — PROGRESS NOTES
Etta Barriga presents today for routine OB visit at 37w4d.  Blood Pressure: 136/78  Wt=85.7 kg (189 lb); Body mass index is 30.51 kg/m².; TWG=20.4 kg (45 lb)  Fetal Heart Rate: 140; Fundal Height (cm): 38 cm  Abdomen: gravid, soft, non-tender.  She reports URI symptoms.  Given Rx Tylenol.  Denies uterine contractions.  Denies vaginal bleeding or leaking of fluid.  Reports adequate fetal movement of at least 10 movements in 2 hours once daily.  Reviewed labor precautions and fetal kick counts as well as pre-eclampsia warning signs.  Reviewed perineal massage for decreasing risk of perineal lacerations during delivery.  Advised to continue medications and return in 1 week.      Current Outpatient Medications   Medication Instructions    acetaminophen (TYLENOL) 650 mg, Oral, Every 8 hours PRN    fluticasone (FLONASE) 50 mcg/act nasal spray 1 spray, Nasal, Daily    lidocaine (Lidoderm) 5 % 1 patch, Topical, Daily, Remove & Discard patch within 12 hours or as directed by MD    ofloxacin (FLOXIN) 0.3 % otic solution 10 drops, Right Ear, 2 times daily    Prenatal Vit-Fe Fumarate-FA (Prenatal Plus Vitamin/Mineral) 27-1 MG TABS 1 tablet, Oral, Daily       Laboratory workup: initial OB labs (done 6/5/24); 28 week labs (done 10/7/24); 36 week cultures (done 12/5/24)    Genetic Screening: declines screening, MSAFP negative    Vaccinations: influenza (declined 10/7/24); Tdap (given 10/7/24); RSV (declined 11/4/24); COVID-19 (rec'd 8/18/21 & 9/8/21)    Postpartum contraception: desires surgical sterilization w/Depoprovera bridge (MA31 signed 10/7/24)    Fetal Ultrasounds:  5/23/24 (8w6d) EDC confirmed  6/11/24 (11w4d) NT WNL  8/13/24 (20w4d) no previa, antonio WNL & complete  11/20/24 (34w5d) EFW=64%, AC=63%, JASMINA=17.0cm, vertex      G3 Problems (from 05/23/24 to present)       No problems associated with this episode.

## 2024-12-15 ENCOUNTER — NURSE TRIAGE (OUTPATIENT)
Dept: OTHER | Facility: OTHER | Age: 34
End: 2024-12-15

## 2024-12-15 ENCOUNTER — HOSPITAL ENCOUNTER (OUTPATIENT)
Facility: HOSPITAL | Age: 34
Discharge: HOME | End: 2024-12-15
Attending: OBSTETRICS & GYNECOLOGY | Admitting: OBSTETRICS & GYNECOLOGY
Payer: COMMERCIAL

## 2024-12-15 VITALS
RESPIRATION RATE: 18 BRPM | SYSTOLIC BLOOD PRESSURE: 114 MMHG | BODY MASS INDEX: 30.51 KG/M2 | HEIGHT: 66 IN | DIASTOLIC BLOOD PRESSURE: 57 MMHG | HEART RATE: 88 BPM

## 2024-12-15 PROBLEM — O46.90 VAGINAL BLEEDING DURING PREGNANCY: Status: ACTIVE | Noted: 2024-12-15

## 2024-12-15 LAB
AMORPH URATE CRY URNS QL MICRO: ABNORMAL
BACTERIA UR QL AUTO: ABNORMAL /HPF
BILIRUB UR QL STRIP: NEGATIVE
CLARITY UR: ABNORMAL
COLOR UR: ABNORMAL
GLUCOSE UR STRIP-MCNC: NEGATIVE MG/DL
HGB UR QL STRIP.AUTO: ABNORMAL
KETONES UR STRIP-MCNC: NEGATIVE MG/DL
LEUKOCYTE ESTERASE UR QL STRIP: ABNORMAL
MUCOUS THREADS UR QL AUTO: ABNORMAL
NITRITE UR QL STRIP: NEGATIVE
NON-SQ EPI CELLS URNS QL MICRO: ABNORMAL /HPF
PH UR STRIP.AUTO: 6.5 [PH]
PROT UR STRIP-MCNC: ABNORMAL MG/DL
RBC #/AREA URNS AUTO: ABNORMAL /HPF
SP GR UR STRIP.AUTO: 1.01 (ref 1–1.03)
UROBILINOGEN UR STRIP-ACNC: <2 MG/DL
WBC #/AREA URNS AUTO: ABNORMAL /HPF

## 2024-12-15 PROCEDURE — 99211 OFF/OP EST MAY X REQ PHY/QHP: CPT

## 2024-12-15 PROCEDURE — 81001 URINALYSIS AUTO W/SCOPE: CPT

## 2024-12-15 PROCEDURE — NC001 PR NO CHARGE: Performed by: OBSTETRICS & GYNECOLOGY

## 2024-12-15 NOTE — PROGRESS NOTES
L&D Triage Note - OB/GYN  Etta Barriga 34 y.o. female MRN: [id number]  Unit/Bed#: -01 Encounter: [id number]      Assessment:  34 y.o.  at 38w2d presenting with vaginal spotting. No concern for infection or labor.    Plan:  1. Vaginal spotting  No blood appreciated on SVE  SVE: 360/-3  NST reactive  No contractions seen  Declined a speculum exam  UA pending, low suspicion for infection.   2. Disposition  Labor precautions discussed  Next OBGYN appt on 24  D/W Dr. Sanches    ______________________________________________________________________      Chief Complaint: spotting    Subjective:  34 y.o.  at 38w2d presenting with vaginal spotting. She states that she went to the mall today, and when going to the bathroom, she noted blood on the toilet paper. She denies LOF, contractions, decreased fetal movement, and dysuria.     Pregnancy is uncomplicated    ROS:   Review of Systems   Constitutional:  Negative for chills and fever.   HENT:  Negative for ear pain and sore throat.    Eyes:  Negative for pain and visual disturbance.   Respiratory:  Negative for cough and shortness of breath.    Cardiovascular:  Negative for chest pain and palpitations.   Gastrointestinal:  Negative for abdominal pain, nausea and vomiting.   Genitourinary:  Negative for dysuria and hematuria.   Musculoskeletal:  Negative for arthralgias and back pain.   Skin:  Negative for color change and rash.   Neurological:  Negative for seizures and syncope.   All other systems reviewed and are negative.      Objective:  Vitals:    12/15/24 1650   BP: 114/57   Pulse: 88   Resp: 18       Physical Exam  Constitutional:       Appearance: Normal appearance.   Cardiovascular:      Rate and Rhythm: Normal rate.      Pulses: Normal pulses.   Pulmonary:      Effort: Pulmonary effort is normal. No respiratory distress.   Abdominal:      Palpations: Abdomen is soft.      Tenderness: There is no abdominal tenderness.    Neurological:      Mental Status: She is alert.   Skin:     General: Skin is warm and dry.   Psychiatric:         Mood and Affect: Mood normal.         Behavior: Behavior normal.   Vitals reviewed.        SVE: 3 / 60% / -3  SSE: declined   FHT:  reactive  [place]: no contractions    Staci Richmond MD 12/15/2024 5:50 PM

## 2024-12-15 NOTE — TELEPHONE ENCOUNTER
"Reason for Disposition   MILD-MODERATE vaginal bleeding (e.g., small to medium clots; like mild menstrual period)  (Exception: Single episode of faint spotting when wiping, or slight spotting after intercourse or pelvic exam.)    Answer Assessment - Initial Assessment Questions  1. ONSET: \"When did this bleeding start?\"         12/15    2. BLEEDING SEVERITY: \"Describe the bleeding that you are having.\" \"How much bleeding is there?\"       Bright red bleeding. Bleeding in the toilet and when patient wipes    3. ABDOMEN PAIN: \"Do you have any pain?\" \"How bad is the pain?\"  (e.g., Scale 0-10; none, mild, moderate, or severe)        Denies    4. PREGNANCY: \"Do you know how many weeks or months pregnant you are?\"         38w2d    5. FRANK: \"What date are you expecting to deliver?\"        12/27/24    6. FETAL MOVEMENT: \"Has the baby's movement decreased or changed significantly from normal?\"        Good FM    7. HIGH-RISK PREGNANCY:  \"Have been told by your doctor that you have a high-risk condition that can cause bleeding?\"  (e.g., placenta previa, vasa previa)         No    8. ULTRASOUND: \"Have you had an ultrasound during this pregnancy?\"  Note: To confirm intrauterine pregnancy, placenta location.        Yes    9. HEMODYNAMIC STATUS: \"Are you weak or feeling lightheaded?\" If Yes, ask: \"Can you stand and walk normally?\"         No    10. OTHER SYMPTOMS: \"What other symptoms are you having with the bleeding?\" (e.g., leaking fluid from vagina, contractions)          No    Protocols used: Pregnancy - Vaginal Bleeding Greater Than 20 Weeks EGA-Adult-AH      Advised patient to go to [facility] L&D for eval. She verbalized understanding. On call OB provider and charge RN notified of her arrival.   "

## 2024-12-18 ENCOUNTER — ROUTINE PRENATAL (OUTPATIENT)
Dept: OBGYN CLINIC | Facility: CLINIC | Age: 34
End: 2024-12-18

## 2024-12-18 VITALS
BODY MASS INDEX: 30.54 KG/M2 | WEIGHT: 189.2 LBS | DIASTOLIC BLOOD PRESSURE: 69 MMHG | HEART RATE: 92 BPM | SYSTOLIC BLOOD PRESSURE: 102 MMHG

## 2024-12-18 DIAGNOSIS — Z3A.38 38 WEEKS GESTATION OF PREGNANCY: ICD-10-CM

## 2024-12-18 DIAGNOSIS — Z34.93 PRENATAL CARE IN THIRD TRIMESTER: Primary | ICD-10-CM

## 2024-12-18 PROCEDURE — 99213 OFFICE O/P EST LOW 20 MIN: CPT | Performed by: NURSE PRACTITIONER

## 2024-12-18 NOTE — PROGRESS NOTES
Etta Barriga presents today for routine OB visit at 38w5d.  Blood Pressure: 102/69  Wt=85.8 kg (189 lb 3.2 oz); Body mass index is 30.54 kg/m².; TWG=20.5 kg (45 lb 3.2 oz)  Fetal Heart Rate: 142; Fundal Height (cm): 37 cm  Abdomen: gravid, soft, non-tender.  She reports pelvic pressure.  Reports occasional mild uterine contractions.  Denies vaginal bleeding since seen on L&D 12/15/24.  Denies vaginal leaking of fluid.  Reports adequate fetal movement of at least 10 movements in 2 hours once daily.  Reviewed labor precautions and fetal kick counts as well as pre-eclampsia warning signs.  Reviewed perineal massage for decreasing risk of perineal lacerations during delivery.  Advised to continue medications and return in 1 week.      Current Outpatient Medications   Medication Instructions    Prenatal Vit-Fe Fumarate-FA (Prenatal Plus Vitamin/Mineral) 27-1 MG TABS 1 tablet, Oral, Daily       Laboratory workup: initial OB labs (done 6/5/24); 28 week labs (done 10/7/24); 36 week cultures (done 12/5/24)    Genetic Screening: declines screening, MSAFP negative    Vaccinations: influenza (declined 10/7/24); Tdap (given 10/7/24); RSV (declined 11/4/24); COVID-19 (rec'd 8/18/21 & 9/8/21)    Postpartum contraception: desires surgical sterilization w/Depoprovera bridge (MA31 signed 10/7/24)    Fetal Ultrasounds:  5/23/24 (8w6d) EDC confirmed  6/11/24 (11w4d) NT WNL  8/13/24 (20w4d) no previa, antonio WNL & complete  11/20/24 (34w5d) EFW=64%, AC=63%, JASMINA=17.0cm, vertex      G3 Problems (from 05/23/24 to present)       No problems associated with this episode.

## 2024-12-18 NOTE — PATIENT INSTRUCTIONS
Thank you for your confidence in our team.   We appreciate you and welcome your feedback.   If you receive a survey from us, please take a few moments to let us know how we are doing.   Sincerely,  CESAR Vásquez       LABOR PRECAUTIONS  Call our office at [phone] for any of the following:    * I need to call immediately I if I have even a small amount of LIQUID  leaking from my vagina, with or without contractions.   * I need to call if I am BLEEDING an amount equal to or more than a period.  A small amount of bloody vaginal discharge is normal at the end of the pregnancy.   * I need to call if I am having CONTRACTIONS  every five minutes for at least an hour.  I will need a watch in order to time them.  I should time them from the beginning of one contraction until the beginning of the next one.   * I need to call BEFORE  I go to the hospital.   * I need to have a plan for TRANSPORTATION  to get to the hospital when I am in labor.  Labor is generally not an emergency which requires an ambulance.          FETAL KICK COUNTS    In the third trimester (after 28 weeks gestation) you should be performing fetal kick counts every day.  Your baby should move at least 10 times in 2 hours during an active time, once a day.    Choose atime of day when your baby is most active.  Try to do this around the same time each day.  Get into a comfortable position and then write down the time your baby first moves.  Count each movement until the baby moves 10 times.  These movements include kicks, punches, nudges, flutters, or rolls.  This can take anywhere from 5 minutes to 2 hours.  Write down the time you feel the baby's 10th movement.    If 2 hours has passed and your baby has not moved at least 10 times, you should CALL THE OFFICE RIGHT AWAY.  [phone]        PERINEAL / VAGINAL MASSAGE    What can I do now to decrease my chances of tearing during delivery?  Massaging around the vaginal opening by you (or your  partner), either antepartum (before birth) or during the second stage of labor, can reduce the likelihood of perineal tearing during childbirth.  Likewise, the use of warm packs held on the perineum during the pushing stage of labor can reduce the severity of your tear.  This will happen during the pushing stage of labor.  At home, you can also help reduce the chances of injury that may occur during the birth of your child through perineal massage.    When should I do this?  Starting around or shortly after 34 weeks of pregnancy, you or your partner should provide 5-10 minutes of vaginal massage 1-4 times per week.    How?  Use either almond, coconut, or olive oil and water mixture on 1 or 2 fingers (depending on comfort).  Insert finger(s) 3-5cm into the vagina.  Apply sweeping downward/sideward pressure from 3 to 9 o'clock for 5-10 minutes, 1-4 times per week.

## 2024-12-23 ENCOUNTER — HOSPITAL ENCOUNTER (INPATIENT)
Facility: HOSPITAL | Age: 34
LOS: 1 days | Discharge: HOME | End: 2024-12-25
Attending: OBSTETRICS & GYNECOLOGY | Admitting: OBSTETRICS & GYNECOLOGY
Payer: COMMERCIAL

## 2024-12-23 DIAGNOSIS — Z3A.38 38 WEEKS GESTATION OF PREGNANCY: Primary | ICD-10-CM

## 2024-12-23 DIAGNOSIS — R10.9 FLANK PAIN: ICD-10-CM

## 2024-12-23 DIAGNOSIS — O47.9 UTERINE CONTRACTIONS: ICD-10-CM

## 2024-12-23 DIAGNOSIS — N20.1 URETEROLITHIASIS: ICD-10-CM

## 2024-12-23 LAB
ABO GROUP BLD: NORMAL
BLD GP AB SCN SERPL QL: NEGATIVE
ERYTHROCYTE [DISTWIDTH] IN BLOOD BY AUTOMATED COUNT: 13.2 % (ref 11.6–15.1)
HCT VFR BLD AUTO: 38.1 % (ref 34.8–46.1)
HGB BLD-MCNC: 13 G/DL (ref 11.5–15.4)
MCH RBC QN AUTO: 29.5 PG (ref 26.8–34.3)
MCHC RBC AUTO-ENTMCNC: 34.1 G/DL (ref 31.4–37.4)
MCV RBC AUTO: 87 FL (ref 82–98)
PLATELET # BLD AUTO: 207 THOUSANDS/UL (ref 149–390)
PMV BLD AUTO: 10.3 FL (ref 8.9–12.7)
RBC # BLD AUTO: 4.4 MILLION/UL (ref 3.81–5.12)
RH BLD: POSITIVE
SPECIMEN EXPIRATION DATE: NORMAL
WBC # BLD AUTO: 9.85 THOUSAND/UL (ref 4.31–10.16)

## 2024-12-23 PROCEDURE — 86850 RBC ANTIBODY SCREEN: CPT | Performed by: OBSTETRICS & GYNECOLOGY

## 2024-12-23 PROCEDURE — 86780 TREPONEMA PALLIDUM: CPT | Performed by: OBSTETRICS & GYNECOLOGY

## 2024-12-23 PROCEDURE — 85027 COMPLETE CBC AUTOMATED: CPT | Performed by: OBSTETRICS & GYNECOLOGY

## 2024-12-23 PROCEDURE — 86900 BLOOD TYPING SEROLOGIC ABO: CPT | Performed by: OBSTETRICS & GYNECOLOGY

## 2024-12-23 PROCEDURE — 86901 BLOOD TYPING SEROLOGIC RH(D): CPT | Performed by: OBSTETRICS & GYNECOLOGY

## 2024-12-23 RX ORDER — PROMETHAZINE HYDROCHLORIDE 25 MG/ML
12.5 INJECTION, SOLUTION INTRAMUSCULAR; INTRAVENOUS ONCE
Status: COMPLETED | OUTPATIENT
Start: 2024-12-23 | End: 2024-12-23

## 2024-12-23 RX ORDER — BUTORPHANOL TARTRATE 1 MG/ML
1 INJECTION, SOLUTION INTRAMUSCULAR; INTRAVENOUS ONCE
Status: COMPLETED | OUTPATIENT
Start: 2024-12-23 | End: 2024-12-23

## 2024-12-23 RX ORDER — SODIUM CHLORIDE, SODIUM LACTATE, POTASSIUM CHLORIDE, CALCIUM CHLORIDE 600; 310; 30; 20 MG/100ML; MG/100ML; MG/100ML; MG/100ML
125 INJECTION, SOLUTION INTRAVENOUS CONTINUOUS
Status: DISCONTINUED | OUTPATIENT
Start: 2024-12-23 | End: 2024-12-24

## 2024-12-23 RX ORDER — LIDOCAINE 50 MG/G
PATCH TOPICAL
Status: COMPLETED
Start: 2024-12-23 | End: 2024-12-24

## 2024-12-23 RX ORDER — LIDOCAINE 50 MG/G
1 PATCH TOPICAL DAILY
Status: DISCONTINUED | OUTPATIENT
Start: 2024-12-24 | End: 2024-12-24

## 2024-12-23 RX ADMIN — PROMETHAZINE HYDROCHLORIDE 12.5 MG: 25 INJECTION INTRAMUSCULAR; INTRAVENOUS at 22:21

## 2024-12-23 RX ADMIN — SODIUM CHLORIDE, SODIUM LACTATE, POTASSIUM CHLORIDE, AND CALCIUM CHLORIDE 1000 ML: .6; .31; .03; .02 INJECTION, SOLUTION INTRAVENOUS at 18:46

## 2024-12-23 RX ADMIN — BUTORPHANOL TARTRATE 1 MG: 1 INJECTION, SOLUTION INTRAMUSCULAR; INTRAVENOUS at 18:43

## 2024-12-23 RX ADMIN — LIDOCAINE 1 PATCH: 50 PATCH TOPICAL at 20:12

## 2024-12-23 RX ADMIN — BUTORPHANOL TARTRATE 1 MG: 1 INJECTION, SOLUTION INTRAMUSCULAR; INTRAVENOUS at 22:21

## 2024-12-23 RX ADMIN — PROMETHAZINE HYDROCHLORIDE 12.5 MG: 25 INJECTION INTRAMUSCULAR; INTRAVENOUS at 18:44

## 2024-12-23 RX ADMIN — SODIUM CHLORIDE, SODIUM LACTATE, POTASSIUM CHLORIDE, AND CALCIUM CHLORIDE 125 ML/HR: .6; .31; .03; .02 INJECTION, SOLUTION INTRAVENOUS at 20:17

## 2024-12-24 ENCOUNTER — ANESTHESIA EVENT (INPATIENT)
Dept: ANESTHESIOLOGY | Facility: HOSPITAL | Age: 34
End: 2024-12-24
Payer: COMMERCIAL

## 2024-12-24 ENCOUNTER — ANESTHESIA (INPATIENT)
Dept: ANESTHESIOLOGY | Facility: HOSPITAL | Age: 34
End: 2024-12-24
Payer: COMMERCIAL

## 2024-12-24 PROBLEM — O47.9 UTERINE CONTRACTIONS: Status: ACTIVE | Noted: 2024-12-24

## 2024-12-24 PROBLEM — Z3A.39 39 WEEKS GESTATION OF PREGNANCY: Status: ACTIVE | Noted: 2024-06-18

## 2024-12-24 LAB
BASE EXCESS BLDCOA CALC-SCNC: -1.9 MMOL/L (ref 3–11)
BASE EXCESS BLDCOV CALC-SCNC: -1.9 MMOL/L (ref 1–9)
HCO3 BLDCOA-SCNC: 26.3 MMOL/L (ref 17.3–27.3)
HCO3 BLDCOV-SCNC: 23.3 MMOL/L (ref 12.2–28.6)
HOLD SPECIMEN: YES
O2 CT VFR BLDCOA CALC: 9.8 ML/DL
OXYHGB MFR BLDCOA: 45.1 %
OXYHGB MFR BLDCOV: 66.3 %
PCO2 BLDCOA: 58.6 MM[HG] (ref 30–60)
PCO2 BLDCOV: 41.2 MM HG (ref 27–43)
PH BLDCOA: 7.27 [PH] (ref 7.23–7.43)
PH BLDCOV: 7.37 [PH] (ref 7.19–7.49)
PO2 BLDCOA: 21.8 MM HG (ref 5–25)
PO2 BLDCOV: 27.9 MM HG (ref 15–45)
SAO2 % BLDCOV: 13.3 ML/DL
TREPONEMA PALLIDUM IGG+IGM AB [PRESENCE] IN SERUM OR PLASMA BY IMMUNOASSAY: NORMAL

## 2024-12-24 PROCEDURE — 59409 OBSTETRICAL CARE: CPT | Performed by: OBSTETRICS & GYNECOLOGY

## 2024-12-24 PROCEDURE — NC001 PR NO CHARGE: Performed by: OBSTETRICS & GYNECOLOGY

## 2024-12-24 PROCEDURE — 4A1HXCZ MONITORING OF PRODUCTS OF CONCEPTION, CARDIAC RATE, EXTERNAL APPROACH: ICD-10-PCS | Performed by: OBSTETRICS & GYNECOLOGY

## 2024-12-24 PROCEDURE — 82805 BLOOD GASES W/O2 SATURATION: CPT | Performed by: OBSTETRICS & GYNECOLOGY

## 2024-12-24 PROCEDURE — 99212 OFFICE O/P EST SF 10 MIN: CPT

## 2024-12-24 RX ORDER — DIPHENHYDRAMINE HYDROCHLORIDE 50 MG/ML
12.5 INJECTION INTRAMUSCULAR; INTRAVENOUS EVERY 6 HOURS PRN
Status: DISCONTINUED | OUTPATIENT
Start: 2024-12-24 | End: 2024-12-24

## 2024-12-24 RX ORDER — ROPIVACAINE HYDROCHLORIDE 2 MG/ML
INJECTION, SOLUTION EPIDURAL; INFILTRATION; PERINEURAL CONTINUOUS PRN
Status: DISCONTINUED | OUTPATIENT
Start: 2024-12-24 | End: 2024-12-24 | Stop reason: HOSPADM

## 2024-12-24 RX ORDER — ACETAMINOPHEN 325 MG/1
975 TABLET ORAL EVERY 6 HOURS
Status: DISCONTINUED | OUTPATIENT
Start: 2024-12-24 | End: 2024-12-25 | Stop reason: HOSPADM

## 2024-12-24 RX ORDER — ONDANSETRON 2 MG/ML
4 INJECTION INTRAMUSCULAR; INTRAVENOUS EVERY 8 HOURS PRN
Status: DISCONTINUED | OUTPATIENT
Start: 2024-12-24 | End: 2024-12-25 | Stop reason: HOSPADM

## 2024-12-24 RX ORDER — OXYTOCIN/RINGER'S LACTATE 30/500 ML
1-30 PLASTIC BAG, INJECTION (ML) INTRAVENOUS
Status: DISCONTINUED | OUTPATIENT
Start: 2024-12-24 | End: 2024-12-24

## 2024-12-24 RX ORDER — CALCIUM CARBONATE 500 MG/1
1000 TABLET, CHEWABLE ORAL DAILY PRN
Status: DISCONTINUED | OUTPATIENT
Start: 2024-12-24 | End: 2024-12-25 | Stop reason: HOSPADM

## 2024-12-24 RX ORDER — LIDOCAINE 50 MG/G
1 PATCH TOPICAL DAILY
Status: DISCONTINUED | OUTPATIENT
Start: 2024-12-25 | End: 2024-12-24

## 2024-12-24 RX ORDER — OXYTOCIN/RINGER'S LACTATE 30/500 ML
250 PLASTIC BAG, INJECTION (ML) INTRAVENOUS ONCE
Status: DISCONTINUED | OUTPATIENT
Start: 2024-12-24 | End: 2024-12-25 | Stop reason: HOSPADM

## 2024-12-24 RX ORDER — LIDOCAINE 50 MG/G
1 PATCH TOPICAL DAILY
Status: DISCONTINUED | OUTPATIENT
Start: 2024-12-24 | End: 2024-12-25 | Stop reason: HOSPADM

## 2024-12-24 RX ORDER — DIPHENHYDRAMINE HCL 25 MG
25 TABLET ORAL EVERY 6 HOURS PRN
Status: DISCONTINUED | OUTPATIENT
Start: 2024-12-24 | End: 2024-12-25 | Stop reason: HOSPADM

## 2024-12-24 RX ORDER — METOCLOPRAMIDE HYDROCHLORIDE 5 MG/ML
10 INJECTION INTRAMUSCULAR; INTRAVENOUS EVERY 6 HOURS PRN
Status: DISCONTINUED | OUTPATIENT
Start: 2024-12-24 | End: 2024-12-25 | Stop reason: HOSPADM

## 2024-12-24 RX ORDER — DOCUSATE SODIUM 100 MG/1
100 CAPSULE, LIQUID FILLED ORAL 2 TIMES DAILY
Status: DISCONTINUED | OUTPATIENT
Start: 2024-12-24 | End: 2024-12-25 | Stop reason: HOSPADM

## 2024-12-24 RX ORDER — MEDROXYPROGESTERONE ACETATE 150 MG/ML
150 INJECTION, SUSPENSION INTRAMUSCULAR ONCE
Status: COMPLETED | OUTPATIENT
Start: 2024-12-24 | End: 2024-12-24

## 2024-12-24 RX ORDER — SIMETHICONE 80 MG
80 TABLET,CHEWABLE ORAL 4 TIMES DAILY PRN
Status: DISCONTINUED | OUTPATIENT
Start: 2024-12-24 | End: 2024-12-25 | Stop reason: HOSPADM

## 2024-12-24 RX ORDER — BUPIVACAINE HYDROCHLORIDE 2.5 MG/ML
30 INJECTION, SOLUTION EPIDURAL; INFILTRATION; INTRACAUDAL ONCE AS NEEDED
Status: DISCONTINUED | OUTPATIENT
Start: 2024-12-24 | End: 2024-12-24

## 2024-12-24 RX ORDER — MEDROXYPROGESTERONE ACETATE 150 MG/ML
150 INJECTION, SUSPENSION INTRAMUSCULAR ONCE
Status: DISCONTINUED | OUTPATIENT
Start: 2024-12-24 | End: 2024-12-24

## 2024-12-24 RX ORDER — SENNOSIDES 8.6 MG
1 TABLET ORAL DAILY PRN
Status: DISCONTINUED | OUTPATIENT
Start: 2024-12-24 | End: 2024-12-25 | Stop reason: HOSPADM

## 2024-12-24 RX ORDER — BENZOCAINE/MENTHOL 6 MG-10 MG
1 LOZENGE MUCOUS MEMBRANE DAILY PRN
Status: DISCONTINUED | OUTPATIENT
Start: 2024-12-24 | End: 2024-12-25 | Stop reason: HOSPADM

## 2024-12-24 RX ORDER — TAMSULOSIN HYDROCHLORIDE 0.4 MG/1
0.4 CAPSULE ORAL
Status: DISCONTINUED | OUTPATIENT
Start: 2024-12-24 | End: 2024-12-25 | Stop reason: HOSPADM

## 2024-12-24 RX ORDER — SODIUM CHLORIDE, SODIUM LACTATE, POTASSIUM CHLORIDE, CALCIUM CHLORIDE 600; 310; 30; 20 MG/100ML; MG/100ML; MG/100ML; MG/100ML
125 INJECTION, SOLUTION INTRAVENOUS CONTINUOUS
Status: DISCONTINUED | OUTPATIENT
Start: 2024-12-24 | End: 2024-12-24

## 2024-12-24 RX ORDER — ROPIVACAINE HYDROCHLORIDE 2 MG/ML
INJECTION, SOLUTION EPIDURAL; INFILTRATION; PERINEURAL
Status: COMPLETED | OUTPATIENT
Start: 2024-12-24 | End: 2024-12-24

## 2024-12-24 RX ORDER — IBUPROFEN 600 MG/1
600 TABLET, FILM COATED ORAL EVERY 6 HOURS PRN
Status: DISCONTINUED | OUTPATIENT
Start: 2024-12-24 | End: 2024-12-25 | Stop reason: HOSPADM

## 2024-12-24 RX ADMIN — LIDOCAINE 1 PATCH: 50 PATCH TOPICAL at 23:02

## 2024-12-24 RX ADMIN — ACETAMINOPHEN 975 MG: 325 TABLET, FILM COATED ORAL at 23:02

## 2024-12-24 RX ADMIN — Medication 2 MILLI-UNITS/MIN: at 11:24

## 2024-12-24 RX ADMIN — SODIUM CHLORIDE, SODIUM LACTATE, POTASSIUM CHLORIDE, AND CALCIUM CHLORIDE 125 ML/HR: .6; .31; .03; .02 INJECTION, SOLUTION INTRAVENOUS at 04:09

## 2024-12-24 RX ADMIN — BENZOCAINE AND LEVOMENTHOL 1 APPLICATION: 200; 5 SPRAY TOPICAL at 18:13

## 2024-12-24 RX ADMIN — WITCH HAZEL 1 PAD: 500 SOLUTION RECTAL; TOPICAL at 18:13

## 2024-12-24 RX ADMIN — ROPIVACAINE HYDROCHLORIDE: 2 INJECTION, SOLUTION EPIDURAL; INFILTRATION at 09:00

## 2024-12-24 RX ADMIN — IBUPROFEN 600 MG: 600 TABLET, FILM COATED ORAL at 18:49

## 2024-12-24 RX ADMIN — SODIUM CHLORIDE, SODIUM LACTATE, POTASSIUM CHLORIDE, AND CALCIUM CHLORIDE 125 ML/HR: .6; .31; .03; .02 INJECTION, SOLUTION INTRAVENOUS at 02:09

## 2024-12-24 RX ADMIN — SODIUM CHLORIDE, SODIUM LACTATE, POTASSIUM CHLORIDE, AND CALCIUM CHLORIDE 125 ML/HR: .6; .31; .03; .02 INJECTION, SOLUTION INTRAVENOUS at 09:30

## 2024-12-24 RX ADMIN — ROPIVACAINE HYDROCHLORIDE 10 ML: 2 INJECTION, SOLUTION EPIDURAL; INFILTRATION; PERINEURAL at 01:25

## 2024-12-24 RX ADMIN — TAMSULOSIN HYDROCHLORIDE 0.4 MG: 0.4 CAPSULE ORAL at 23:03

## 2024-12-24 RX ADMIN — MEDROXYPROGESTERONE ACETATE 150 MG: 150 INJECTION, SUSPENSION INTRAMUSCULAR at 19:52

## 2024-12-24 RX ADMIN — ACETAMINOPHEN 975 MG: 325 TABLET, FILM COATED ORAL at 16:36

## 2024-12-24 RX ADMIN — ROPIVACAINE HYDROCHLORIDE 10 ML/HR: 2 INJECTION, SOLUTION EPIDURAL; INFILTRATION at 01:21

## 2024-12-24 NOTE — ASSESSMENT & PLAN NOTE
Recovering well   Routine postpartum care, encourage ambulation, encourage familial bonding  Lactation support for breast/bottle feeding as needed  Pre-delivery Hgb 13  FEN: Tolerating regular diet  Pain: Motrin/Tylenol around the clock, oxycodone if needed  Appropriate bowel and bladder function   DVT Ppx: Ambulation

## 2024-12-24 NOTE — DISCHARGE SUMMARY
Obstetrics Discharge Summary  Etta Barriga 34 y.o. female MRN: [id number]  Unit/Bed#: -01 Encounter: [id number]    Admission Date: 2024   Discharge Attending: Lashay Somers MD  Discharge Diagnosis:   *  (spontaneous vaginal delivery)  Assessment & Plan  Recovering well   Routine postpartum care, encourage ambulation, encourage familial bonding  Lactation support for breast/bottle feeding as needed  Pre-delivery Hgb 13  FEN: Tolerating regular diet  Pain: Motrin/Tylenol around the clock, oxycodone if needed  Appropriate bowel and bladder function   DVT Ppx: Ambulation       Delivery Method: Vaginal, Spontaneous   Delivery Date and Time: 2024 3:01 PM  Delivery Attending: José Antonio Jama MD    Anesthesia: epidural    Hospital course:   Ms. Etta Barriga is a 34 y.o.  at 39wk4d. She presented to labor and delivery and on initial exam she was noted to be 3.5/50/-3; Her FHT was Category 1. She was admitted for labor . For augmentation, she received Amniotomy and Pitocin. For pain control she received Epidural. She achieved full dilation at 1449.     On 24 at 1501 she delivered a viable male  39w4d via spontaneous vaginal delivery. She sustained No laceration during delivery. repaired. 's birth weight was 7lbs 7oz; Apgars were 8 (1 min) and  9 (5 min).    Her post-delivery course was uncomplicated. She developed R flank pain again postpartum (had been present and colicky antepartum) that she reported as feeling similar to a prior kidney stone. Her pain improved with flomax, ibuprofen, and PRN oxycodone and she was recommended to follow up with an outpatient renal ultrasound if the pain did not resolve with these interventions. Her postpartum pain was well controlled with oral analgesics. Mom's blood type is O positive.  RhoGAM was not indicated      On day of discharge, she was ambulating and able to reasonably perform all ADLs. She was voiding and had  appropriate bowel function. Pain was well controlled. She was discharged home on postpartum day #1 without complications. Patient was instructed to follow up with her OBGYN as an outpatient and was given appropriate warnings to call provider if she develops signs of infection or uncontrolled pain.    Complications: none apparent    Condition at discharge: good     Provisions for Follow-Up Care:  Please see after visit summary for information related to follow-up care and any pertinent home health orders.    No follow-ups on file.    Disposition: Home    Discharge Medications:   Please see AVS for a complete list of discharge medications.    Discharge instructions :   Please see AVS for complete discharge instructions.    Lindsay Mcconnell MD  PGY 2, Obstetrics and Gynecology  12/25/2024  4:03 PM

## 2024-12-24 NOTE — OB LABOR/OXYTOCIN SAFETY PROGRESS
Labor Progress Note - Etta Barriga 34 y.o. female MRN: [id number]    Unit/Bed#: -01 Encounter: [id number]       Contraction Frequency (minutes): 6.5-7  Contraction Intensity: Moderate  Uterine Activity Characteristics: Irregular  Cervical Dilation: 7        Cervical Effacement: 80  Fetal Station: -2  Baseline Rate (FHR): 145 bpm  Fetal Heart Rate (FHT): 140 BPM  FHR Category: 1               Vital Signs:   Vitals:    12/24/24 0814   BP: 115/64   Pulse: (!) 106   Temp:        Notes/comments:   AROM for clear fluid, SVE as above. NIKHIL Hall MD 12/24/2024 8:30 AM

## 2024-12-24 NOTE — PLAN OF CARE
Problem: PAIN - ADULT  Goal: Verbalizes/displays adequate comfort level or baseline comfort level  Description: Interventions:  - Encourage patient to monitor pain and request assistance  - Assess pain using appropriate pain scale  - Administer analgesics based on type and severity of pain and evaluate response  - Implement non-pharmacological measures as appropriate and evaluate response  - Consider cultural and social influences on pain and pain management  - Notify physician/advanced practitioner if interventions unsuccessful or patient reports new pain  Outcome: Progressing     Problem: INFECTION - ADULT  Goal: Absence or prevention of progression during hospitalization  Description: INTERVENTIONS:  - Assess and monitor for signs and symptoms of infection  - Monitor lab/diagnostic results  - Monitor all insertion sites, i.e. indwelling lines, tubes, and drains  - Monitor endotracheal if appropriate and nasal secretions for changes in amount and color  - [place] appropriate cooling/warming therapies per order  - Administer medications as ordered  - Instruct and encourage patient and family to use good hand hygiene technique  - Identify and instruct in appropriate isolation precautions for identified infection/condition  Outcome: Progressing  Goal: Absence of fever/infection during neutropenic period  Description: INTERVENTIONS:  - Monitor WBC    Outcome: Progressing     Problem: SAFETY ADULT  Goal: Patient will remain free of falls  Description: INTERVENTIONS:  - Educate patient/family on patient safety including physical limitations  - Instruct patient to call for assistance with activity   - Consult OT/PT to assist with strengthening/mobility   - Keep Call bell within reach  - Keep bed low and locked with side rails adjusted as appropriate  - Keep care items and personal belongings within reach  - Initiate and maintain comfort rounds  - Make Fall Risk Sign visible to staff  - Apply yellow socks and bracelet  for high fall risk patients  - Consider moving patient to room near nurses station  Outcome: Progressing  Goal: Maintain or return to baseline ADL function  Description: INTERVENTIONS:  -  Assess patient's ability to carry out ADLs; assess patient's baseline for ADL function and identify physical deficits which impact ability to perform ADLs (bathing, care of mouth/teeth, toileting, grooming, dressing, etc.)  - Assess/evaluate cause of self-care deficits   - Assess range of motion  - Assess patient's mobility; develop plan if impaired  - Assess patient's need for assistive devices and provide as appropriate  - Encourage maximum independence but intervene and supervise when necessary  - Involve family in performance of ADLs  - Assess for home care needs following discharge   - Consider OT consult to assist with ADL evaluation and planning for discharge  - Provide patient education as appropriate  Outcome: Progressing  Goal: Maintains/Returns to pre admission functional level  Description: INTERVENTIONS:  - Perform AM-PAC 6 Click Basic Mobility/ Daily Activity assessment daily.  - Set and communicate daily mobility goal to care team and patient/family/caregiver.   - Collaborate with rehabilitation services on mobility goals if consulted  - Out of bed for toileting  - Record patient progress and toleration of activity level   Outcome: Progressing     Problem: DISCHARGE PLANNING  Goal: Discharge to home or other facility with appropriate resources  Description: INTERVENTIONS:  - Identify barriers to discharge w/patient and caregiver  - Arrange for needed discharge resources and transportation as appropriate  - Identify discharge learning needs (meds, wound care, etc.)  - Arrange for interpretive services to assist at discharge as needed  - Refer to Case Management Department for coordinating discharge planning if the patient needs post-hospital services based on physician/advanced practitioner order or complex needs  related to functional status, cognitive ability, or social support system  Outcome: Progressing     Problem: Knowledge Deficit  Goal: Patient/family/caregiver demonstrates understanding of disease process, treatment plan, medications, and discharge instructions  Description: Complete learning assessment and assess knowledge base.  Interventions:  - Provide teaching at level of understanding  - Provide teaching via preferred learning methods  Outcome: Progressing     Problem: BIRTH - VAGINAL/ SECTION  Goal: Fetal and maternal status remain reassuring during the birth process  Description: INTERVENTIONS:  - Monitor vital signs  - Monitor fetal heart rate  - Monitor uterine activity  - Monitor labor progression (vaginal delivery)  - DVT prophylaxis  - Antibiotic prophylaxis  Outcome: Progressing  Goal: Emotionally satisfying birthing experience for mother/fetus  Description: Interventions:  - Assess, plan, implement and evaluate the nursing care given to the patient in labor  - Advocate the philosophy that each childbirth experience is a unique experience and support the family's chosen level of involvement and control during the labor process   - Actively participate in both the patient's and family's teaching of the birth process  - Consider cultural, Jehovah's witness and age-specific factors and plan care for the patient in labor  Outcome: Progressing

## 2024-12-24 NOTE — ANESTHESIA POSTPROCEDURE EVALUATION
"Post-Op Assessment Note    CV Status:  Stable    Pain management: adequate      Post-op block assessment: no complications and catheter intact   Mental Status:  Awake   Hydration Status:  Stable   PONV Controlled:  Controlled   Airway Patency:  Patent     Post Op Vitals Reviewed: Yes    No anethesia notable event occurred.    Staff: Anesthesiologist         /60   Pulse 104   Temp 99.8 °F (37.7 °C) (Oral)   Resp 17   Ht 5' 6\" (1.676 m)   Wt 85.7 kg (189 lb)   LMP 03/15/2024 (Approximate)   BMI 30.51 kg/m²     Last Filed PACU Vitals:  Vitals Value Taken Time   Temp     Pulse     BP     Resp     SpO2         Modified Nate:  No data recorded      "

## 2024-12-24 NOTE — ADDENDUM NOTE
Addendum  created 12/24/24 1618 by Zoila Rubio MD    Clinical Note Signed, Intraprocedure Event edited, Intraprocedure Staff edited, LDA properties accepted

## 2024-12-24 NOTE — H&P
"H & P- Obstetrics   Etta Barriga 34 y.o. female MRN: [id number]  Unit/Bed#: -01 Encounter: [id number]    Assessment: 34 y.o.  at 39w4d admitted for labor .  SVE: /-3  FHT: cat 1  Clinical EFW: 8.5 lbs; Cephalic confirmed by TAUS  GBS status: negative     Plan:   39 weeks gestation of pregnancy  Assessment & Plan  - PNL wnl  - O+  - GBS neg  - EFW 5 lbs 14 oz (64%) at 34w5d    * Uterine contractions  Assessment & Plan  SVE 3.5/50/-3 -> 50/-3 -> /-3  Admitted for labor  Admission labs - CBC, T&S, syphilis screen  Clear liquid diet  Anesthesia consult placed   Rh+, Rhogam not indicated   GBS-, prophylaxis not indicated  Proceed with expectant management        Discussed case and plan w/ Dr. Giraldo.      Chief Complaint: \"My back and stomach hurt.\"     HPI: Etta Barriga is a 34 y.o.  with an FRANK of 2024, by Ultrasound at 39w4d who is being admitted for labor . She initially presented for abdominal and back pain, which she described as wrapping around her right groin. She says it originaly felt like the pain she had previously when she had a kidney stone. However, the pain persisted and she was noted to be olegario on tocometry. SVE made steady change from 3 cm to 5 cm and she was admitted for labor. She otherwise has no LOF, and reports no VB. She states she has felt good FM.    Patient Active Problem List   Diagnosis    39 weeks gestation of pregnancy    Uterine contractions       Baby complications/comments: none    Review of Systems   Constitutional:  Negative for chills and fever.   Respiratory:  Negative for cough and shortness of breath.    Gastrointestinal:  Negative for diarrhea, nausea and vomiting.   Genitourinary:  Negative for dysuria and hematuria.   Skin:  Negative for color change and rash.   Neurological:  Negative for dizziness, syncope and headaches.       OB Hx:  OB History    Para Term  AB Living   3 2 2 0 0 2   SAB IAB " Ectopic Multiple Live Births   0 0 0 0 2      # Outcome Date GA Lbr Rory/2nd Weight Sex Type Anes PTL Lv   3 Current            2 Term 09/11/20 41w0d / 00:08 3856 g (8 lb 8 oz) M Vag-Spont EPI  ANITRA   1 Term 02/11/18 40w0d  3374 g (7 lb 7 oz) F Vag-Spont EPI N ANITRA      Birth Comments: FOB1       Past Medical Hx:  Past Medical History:   Diagnosis Date    Hyperlipidemia     Kidney stone 2022       Past Surgical hx:  Past Surgical History:   Procedure Laterality Date    NO PAST SURGERIES         Social Hx:  Alcohol use: denies  Tobacco use: denies  Other substance use: denies    No Known Allergies      Medications Prior to Admission:     Prenatal Vit-Fe Fumarate-FA (Prenatal Plus Vitamin/Mineral) 27-1 MG TABS    Objective:  HR:  [90] 90  BP: (132)/(88) 132/88  There is no height or weight on file to calculate BMI.     Physical Exam:  Physical Exam  Constitutional:       Appearance: Normal appearance.   Genitourinary:      Vulva normal.   Cardiovascular:      Rate and Rhythm: Normal rate.   Pulmonary:      Effort: Pulmonary effort is normal.   Abdominal:      Palpations: Abdomen is soft.   Neurological:      General: No focal deficit present.      Mental Status: She is alert and oriented to person, place, and time.   Skin:     General: Skin is dry.   Psychiatric:         Mood and Affect: Mood normal.        FHT:  Baseline Rate (FHR): 135 bpm  Variability: Moderate  Accelerations: 15 x 15 or greater  Decelerations: None    TOCO:   Contraction Frequency (minutes): 3-7  Contraction Duration (seconds): 70-80  Contraction Intensity: Moderate    Lab Results   Component Value Date    WBC 9.85 12/23/2024    HGB 13.0 12/23/2024    HCT 38.1 12/23/2024     12/23/2024     Lab Results   Component Value Date    K 4.1 09/11/2022     09/11/2022    CO2 22 09/11/2022    BUN 12 09/11/2022    CREATININE 0.72 09/11/2022    AST 31 09/11/2022    ALT 21 09/11/2022     Prenatal Labs: Reviewed      Blood type: O+  Antibody:  negative  GBS: negative  HIV: non-reactive  Rubella: immune  Syphilis IgM/IgG: non-reactive  HBsAg: non-reactive  HCAb: non-reactive  Chlamydia: negative  Gonorrhea: negative  Diabetes 1 hour screen: passed  3 hour glucose: n/a  Platelets: 207 thousands/uL  Hgb: 13.0 g/dL  >2 Midnights  INPATIENT     Signature/Title: Radha Hill MD  Date: 12/24/2024  Time: 4:41 AM

## 2024-12-24 NOTE — ADDENDUM NOTE
Addendum  created 12/24/24 1448 by Zoila Rubio MD    Attestation recorded in Intraprocedure, Intraprocedure Attestations filed, Intraprocedure Event deleted, Intraprocedure Staff edited

## 2024-12-24 NOTE — L&D DELIVERY NOTE
Vaginal Delivery Note - OB/GYN   Etta Barriga 34 y.o. female MRN: [id number]  Unit/Bed#: -01 Encounter: [id number]    Pre-delivery Diagnosis:   1. Pregnancy at 39W4D     Post-delivery Diagnosis: same, delivered    Procedure: Spontaneous Vaginal Delivery     Attending: José Antonio Jama MD    Assistant(s):  Adia Hall MD    Anesthesia: Epidural    QBL: 238cc    Complications: none apparent    Specimens:   1. Arterial and venous cord gases  2. Cord blood  3. Placenta to storage     Findings:  1. Viable male on 2024 at 1501, with APGARS of 8 and 9 at 1 and 5 minutes respectively,  2. Spontaneous delivery of intact placenta at 1506  3. No laceration   4. Umbilical Cord Venous Blood Gas:  Results from last 7 days   Lab Units 24  1520   PH COV  7.370   PCO2 COV mm HG 41.2   HCO3 COV mmol/L 23.3   BASE EXC COV mmol/L -1.9*   O2 CT CD VB mL/dL 13.3   O2 HGB, VENOUS CORD % 66.3     5. Umbilical Cord Arterial Blood Gas:  Results from last 7 days   Lab Units 24  1520   PH COA  7.270   PCO2 COA  58.6   PO2 COA mm HG 21.8   HCO3 COA mmol/L 26.3   BASE EXC COA mmol/L -1.9*   O2 CONTENT CORD ART ml/dl 9.8   O2 HGB, ARTERIAL CORD % 45.1       Disposition:  Patient is recovering in labor and delivery room     Brief history and labor course:  Ms. Etta Barriga is a 34 y.o.  at 39wk4d. She presented to labor and delivery and on initial exam she was noted to be 3.5/50/-3; Her FHT was Category 1. She was admitted for labor . For augmentation, she received Amniotomy and Pitocin. For pain control she received Epidural. She achieved full dilation at 1449.    Description of procedure:  She started pushing at 1459, and at 1501 the patient delivered a viable , apgars of 8 (1 min) and 9 (5 min). The fetal vertex delivered spontaneously. There was no a nuchal cord. The baby restituted to Left Occiput Anterior. The anterior shoulder delivered atraumatically with maternal expulsive forces and  the assistance of gentle downward traction. The posterior shoulder delivered with maternal expulsive forces and the assistance of gentle upward traction. The remainder of the fetus delivered spontaneously.    Upon delivery, the infant was placed on the mothers abdomen and the cord was clamped and cut. The infant was noted to cry spontaneously and was moving all extremities appropriately. There was no evidence of injury. Awaiting resuscitators evaluated the . Arterial and venous cord blood gases and cord blood was collected for analysis. These were promptly sent to the lab. Active management of the third stage of labor was undertaken with IV pitocin at 250 enrique-units/min and the uterus was noted to contract down well with massage and pitocin. Placenta was delivered with fundal massage and gentle traction on the cord with active management of the third stage of labor at 1506 and was noted to have a centrally inserted 3 vessel cord. A bimanual exam was performed and bleeding was noted to be under control. The vagina, cervix, perineum, and rectum were inspected and there was noted to be No laceration    Disposition:  Patient tolerated the procedure well and was recovering in labor and delivery room     Attending Supervision:   Dr. José Antonio Jama MD was present for the entire procedure.     Adia Hall MD  OB/GYN PGY-1  2024 4:36 PM

## 2024-12-24 NOTE — PLAN OF CARE
Problem: BIRTH - VAGINAL/ SECTION  Goal: Fetal and maternal status remain reassuring during the birth process  Description: INTERVENTIONS:  - Monitor vital signs  - Monitor fetal heart rate  - Monitor uterine activity  - Monitor labor progression (vaginal delivery)  - DVT prophylaxis  - Antibiotic prophylaxis  Outcome: Completed  Goal: Emotionally satisfying birthing experience for mother/fetus  Description: Interventions:  - Assess, plan, implement and evaluate the nursing care given to the patient in labor  - Advocate the philosophy that each childbirth experience is a unique experience and support the family's chosen level of involvement and control during the labor process   - Actively participate in both the patient's and family's teaching of the birth process  - Consider cultural, Islam and age-specific factors and plan care for the patient in labor  Outcome: Completed     Problem: PAIN - ADULT  Goal: Verbalizes/displays adequate comfort level or baseline comfort level  Description: Interventions:  - Encourage patient to monitor pain and request assistance  - Assess pain using appropriate pain scale  - Administer analgesics based on type and severity of pain and evaluate response  - Implement non-pharmacological measures as appropriate and evaluate response  - Consider cultural and social influences on pain and pain management  - Notify physician/advanced practitioner if interventions unsuccessful or patient reports new pain  Outcome: Progressing     Problem: INFECTION - ADULT  Goal: Absence or prevention of progression during hospitalization  Description: INTERVENTIONS:  - Assess and monitor for signs and symptoms of infection  - Monitor lab/diagnostic results  - Monitor all insertion sites, i.e. indwelling lines, tubes, and drains  - Monitor endotracheal if appropriate and nasal secretions for changes in amount and color  - [place] appropriate cooling/warming therapies per order  - Administer  medications as ordered  - Instruct and encourage patient and family to use good hand hygiene technique  - Identify and instruct in appropriate isolation precautions for identified infection/condition  Outcome: Progressing  Goal: Absence of fever/infection during neutropenic period  Description: INTERVENTIONS:  - Monitor WBC    Outcome: Progressing     Problem: SAFETY ADULT  Goal: Patient will remain free of falls  Description: INTERVENTIONS:  - Educate patient/family on patient safety including physical limitations  - Instruct patient to call for assistance with activity   - Consult OT/PT to assist with strengthening/mobility   - Keep Call bell within reach  - Keep bed low and locked with side rails adjusted as appropriate  - Keep care items and personal belongings within reach  - Initiate and maintain comfort rounds  - Make Fall Risk Sign visible to staff  - Apply yellow socks and bracelet for high fall risk patients  - Consider moving patient to room near nurses station  Outcome: Progressing  Goal: Maintain or return to baseline ADL function  Description: INTERVENTIONS:  -  Assess patient's ability to carry out ADLs; assess patient's baseline for ADL function and identify physical deficits which impact ability to perform ADLs (bathing, care of mouth/teeth, toileting, grooming, dressing, etc.)  - Assess/evaluate cause of self-care deficits   - Assess range of motion  - Assess patient's mobility; develop plan if impaired  - Assess patient's need for assistive devices and provide as appropriate  - Encourage maximum independence but intervene and supervise when necessary  - Involve family in performance of ADLs  - Assess for home care needs following discharge   - Consider OT consult to assist with ADL evaluation and planning for discharge  - Provide patient education as appropriate  Outcome: Progressing  Goal: Maintains/Returns to pre admission functional level  Description: INTERVENTIONS:  - Perform AM-PAC 6 Click  Basic Mobility/ Daily Activity assessment daily.  - Set and communicate daily mobility goal to care team and patient/family/caregiver.   - Collaborate with rehabilitation services on mobility goals if consulted  - Out of bed for toileting  - Record patient progress and toleration of activity level   Outcome: Progressing     Problem: DISCHARGE PLANNING  Goal: Discharge to home or other facility with appropriate resources  Description: INTERVENTIONS:  - Identify barriers to discharge w/patient and caregiver  - Arrange for needed discharge resources and transportation as appropriate  - Identify discharge learning needs (meds, wound care, etc.)  - Arrange for interpretive services to assist at discharge as needed  - Refer to Case Management Department for coordinating discharge planning if the patient needs post-hospital services based on physician/advanced practitioner order or complex needs related to functional status, cognitive ability, or social support system  Outcome: Progressing     Problem: Knowledge Deficit  Goal: Patient/family/caregiver demonstrates understanding of disease process, treatment plan, medications, and discharge instructions  Description: Complete learning assessment and assess knowledge base.  Interventions:  - Provide teaching at level of understanding  - Provide teaching via preferred learning methods  Outcome: Progressing     Problem: POSTPARTUM  Goal: Experiences normal postpartum course  Description: INTERVENTIONS:  - Monitor maternal vital signs  - Assess uterine involution and lochia  Outcome: Progressing  Goal: Appropriate maternal -  bonding  Description: INTERVENTIONS:  - Identify family support  - Assess for appropriate maternal/infant bonding   -Encourage maternal/infant bonding opportunities  - Referral to  or  as needed  Outcome: Progressing  Goal: Establishment of infant feeding pattern  Description: INTERVENTIONS:  - Assess breast/bottle  feeding  - Refer to lactation as needed  Outcome: Progressing  Goal: Incision(s), wounds(s) or drain site(s) healing without S/S of infection  Description: INTERVENTIONS  - Assess and document dressing, incision, wound bed, drain sites and surrounding tissue  - Provide patient and family education  Outcome: Progressing

## 2024-12-24 NOTE — OB LABOR/OXYTOCIN SAFETY PROGRESS
Oxytocin Safety Progress Check Note - Etta Barriga 34 y.o. female MRN: [id number]    Unit/Bed#: -01 Encounter: [id number]    Dose (enrique-units/min) Oxytocin: 4 enrique-units/min  Contraction Frequency (minutes): 2 - 3  Contraction Intensity: Moderate/Strong  Uterine Activity Characteristics: Irregular  Cervical Dilation: 9        Cervical Effacement: 90  Fetal Station: 0  Baseline Rate (FHR): 150 bpm  Fetal Heart Rate (FHT): 140 BPM  FHR Category: 1               Vital Signs:   Vitals:    12/24/24 1215   BP: 114/63   Pulse: 93   Resp:    Temp:        Notes/comments:   SVE as above, continue to titrate pitocin as tolerated     Adia Hall MD 12/24/2024 12:38 PM       negative...

## 2024-12-24 NOTE — ANESTHESIA POSTPROCEDURE EVALUATION
Post-Op Assessment Note    CV Status:  Stable    Pain management: adequate       Mental Status:  Alert and awake   Hydration Status:  Euvolemic   PONV Controlled:  Controlled   Airway Patency:  Patent     Post Op Vitals Reviewed: Yes    No anethesia notable event occurred.    Staff: Anesthesiologist           Last Filed PACU Vitals:  Vitals Value Taken Time   Temp     Pulse     BP     Resp     SpO2         Modified Nate:  No data recorded    /88   Pulse 90   LMP 03/15/2024 (Approximate)

## 2024-12-24 NOTE — OB LABOR/OXYTOCIN SAFETY PROGRESS
Labor Progress Note - Etta Barriga 34 y.o. female MRN: [id number]    Unit/Bed#: -01 Encounter: [id number]       Contraction Frequency (minutes): 3-5.5  Contraction Intensity: Moderate  Uterine Activity Characteristics: Irregular  Cervical Dilation: 7        Cervical Effacement: 80  Fetal Station: -2  Baseline Rate (FHR): 155 bpm  Fetal Heart Rate (FHT): 140 BPM  FHR Category: 1               Vital Signs:     Vitals:    12/24/24 1030   BP: 105/53   Pulse: 100   Resp:    Temp:        Notes/comments:   SVE unchanged, plan to start pitocin     Adia Hall MD 12/24/2024 10:49 AM

## 2024-12-24 NOTE — OB LABOR/OXYTOCIN SAFETY PROGRESS
Oxytocin Safety Progress Check Note - Etta Barriga 34 y.o. female MRN: [id number]    Unit/Bed#: -01 Encounter: [id number]    Dose (enrique-units/min) Oxytocin: 4 enrique-units/min  Contraction Frequency (minutes): 2 - 3  Contraction Intensity: Moderate/Strong  Uterine Activity Characteristics: Irregular  Cervical Dilation: 9        Cervical Effacement: 90  Fetal Station: 0  Baseline Rate (FHR): 150 bpm  Fetal Heart Rate (FHT): 140 BPM  FHR Category: 1               Vital Signs:   Vitals:    12/24/24 1300   BP: 117/57   Pulse: (!) 113   Resp:    Temp: 99.7 °F (37.6 °C)       Notes/comments:   Pt feeling pressure, checked and SVE unchanged. Plan to check again in 1h     Adia aHll MD 12/24/2024 1:41 PM

## 2024-12-24 NOTE — OB LABOR/OXYTOCIN SAFETY PROGRESS
Oxytocin Safety Progress Check Note - Etta Barriga 34 y.o. female MRN: [id number]    Unit/Bed#: -01 Encounter: [id number]    Dose (enrique-units/min) Oxytocin: 4 enrique-units/min  Contraction Frequency (minutes): 1 - 4  Contraction Intensity: Strong  Uterine Activity Characteristics: Irregular  Cervical Dilation: Lip/rim (Comment)        Cervical Effacement: 90  Fetal Station: 0  Baseline Rate (FHR): 140 bpm  Fetal Heart Rate (FHT): 140 BPM  FHR Category: 1               Vital Signs:   Vitals:    12/24/24 1300   BP: 117/57   Pulse: (!) 113   Resp:    Temp: 99.7 °F (37.6 °C)       Notes/comments:   Called to patient's room for increased pressure.  SVE 9.5/100/0 with thin anterior lip which is nearly reducible.  FHT category 1 with contractions every 2-3 min.  Pit running at 4, continue titrating to contractions.  Dr. Jama aware.    Stephy Campos MD 12/24/2024 2:34 PM

## 2024-12-24 NOTE — ANESTHESIA PREPROCEDURE EVALUATION
Procedure:  LABOR ANALGESIA    Relevant Problems   GYN   (+) 38 weeks gestation of pregnancy        Physical Exam    Airway    Mallampati score: II         Dental       Cardiovascular  Rhythm: regular, Rate: normal    Pulmonary   Breath sounds clear to auscultation    Other Findings  post-pubertal.      Anesthesia Plan  ASA Score- 2     Anesthesia Type- epidural with ASA Monitors.         Additional Monitors:     Airway Plan:            Plan Factors-Exercise tolerance (METS): >4 METS.    Chart reviewed.   Existing labs reviewed. Patient summary reviewed.    Patient is not a current smoker.      Obstructive sleep apnea risk education given perioperatively.        Induction- intravenous.    Postoperative Plan-     Perioperative Resuscitation Plan - Level 1 - Full Code.       Informed Consent- Anesthetic plan and risks discussed with patient.

## 2024-12-24 NOTE — ANESTHESIA PROCEDURE NOTES
Epidural Block    Patient location during procedure: OB/L&D  Start time: 12/24/2024 1:16 AM  Reason for block: at surgeon's request  Staffing  Performed by: Nawaf Otto DO  Authorized by: Nawaf Otto DO    Preanesthetic Checklist  Completed: patient identified, IV checked, site marked, risks and benefits discussed, surgical consent, monitors and equipment checked, pre-op evaluation and timeout performed  Epidural  Patient position: sitting  Prep: Betadine  Sedation Level: no sedation  Patient monitoring: continuous pulse oximetry, frequent blood pressure checks and heart rate  Approach: midline  Location: lumbar, L3-4  Injection technique: ANGY air  Needle  Needle type: Tuohy   Needle gauge: 18 G  Catheter type: side hole  Catheter securement method: tape  Test dose: negativeropivacaine (NAROPIN) 0.2% injection 10 mL - Epidural   10 mL - 12/24/2024 1:25:00 AM  Assessment  Sensory level: T10  Number of attempts: 1negative aspiration for CSF, negative aspiration for heme and no paresthesia on injection  patient tolerated the procedure well with no immediate complications

## 2024-12-25 VITALS
OXYGEN SATURATION: 97 % | SYSTOLIC BLOOD PRESSURE: 100 MMHG | HEART RATE: 78 BPM | BODY MASS INDEX: 30.37 KG/M2 | HEIGHT: 66 IN | RESPIRATION RATE: 18 BRPM | DIASTOLIC BLOOD PRESSURE: 63 MMHG | TEMPERATURE: 97.8 F | WEIGHT: 189 LBS

## 2024-12-25 DIAGNOSIS — R10.9 FLANK PAIN: Primary | ICD-10-CM

## 2024-12-25 LAB
BACTERIA UR QL AUTO: ABNORMAL /HPF
BILIRUB UR QL STRIP: NEGATIVE
CLARITY UR: CLEAR
COLOR UR: ABNORMAL
GLUCOSE UR STRIP-MCNC: NEGATIVE MG/DL
HGB UR QL STRIP.AUTO: ABNORMAL
KETONES UR STRIP-MCNC: NEGATIVE MG/DL
LEUKOCYTE ESTERASE UR QL STRIP: ABNORMAL
MUCOUS THREADS UR QL AUTO: ABNORMAL
NITRITE UR QL STRIP: NEGATIVE
NON-SQ EPI CELLS URNS QL MICRO: ABNORMAL /HPF
PH UR STRIP.AUTO: 7 [PH]
PROT UR STRIP-MCNC: NEGATIVE MG/DL
RBC #/AREA URNS AUTO: ABNORMAL /HPF
SP GR UR STRIP.AUTO: 1.02 (ref 1–1.03)
UROBILINOGEN UR STRIP-ACNC: <2 MG/DL
WBC #/AREA URNS AUTO: ABNORMAL /HPF

## 2024-12-25 PROCEDURE — NC001 PR NO CHARGE: Performed by: STUDENT IN AN ORGANIZED HEALTH CARE EDUCATION/TRAINING PROGRAM

## 2024-12-25 PROCEDURE — 81001 URINALYSIS AUTO W/SCOPE: CPT

## 2024-12-25 PROCEDURE — 99024 POSTOP FOLLOW-UP VISIT: CPT | Performed by: STUDENT IN AN ORGANIZED HEALTH CARE EDUCATION/TRAINING PROGRAM

## 2024-12-25 RX ORDER — OXYCODONE HYDROCHLORIDE 5 MG/1
5 TABLET ORAL EVERY 4 HOURS PRN
Qty: 5 TABLET | Refills: 0 | Status: SHIPPED | OUTPATIENT
Start: 2024-12-25 | End: 2025-01-04

## 2024-12-25 RX ORDER — BENZOCAINE/MENTHOL 6 MG-10 MG
1 LOZENGE MUCOUS MEMBRANE DAILY PRN
Start: 2024-12-25

## 2024-12-25 RX ORDER — ACETAMINOPHEN 325 MG/1
975 TABLET ORAL EVERY 6 HOURS
Qty: 360 TABLET | Refills: 0 | Status: SHIPPED | OUTPATIENT
Start: 2024-12-25 | End: 2024-12-25

## 2024-12-25 RX ORDER — ACETAMINOPHEN 325 MG/1
975 TABLET ORAL EVERY 6 HOURS
Qty: 360 TABLET | Refills: 0 | Status: CANCELLED | OUTPATIENT
Start: 2024-12-25

## 2024-12-25 RX ORDER — OXYCODONE HYDROCHLORIDE 5 MG/1
5 TABLET ORAL ONCE
Refills: 0 | Status: COMPLETED | OUTPATIENT
Start: 2024-12-25 | End: 2024-12-25

## 2024-12-25 RX ORDER — TAMSULOSIN HYDROCHLORIDE 0.4 MG/1
0.4 CAPSULE ORAL
Qty: 30 CAPSULE | Refills: 0 | Status: SHIPPED | OUTPATIENT
Start: 2024-12-25

## 2024-12-25 RX ORDER — ACETAMINOPHEN 325 MG/1
975 TABLET ORAL EVERY 6 HOURS
Qty: 360 TABLET | Refills: 0 | Status: SHIPPED | OUTPATIENT
Start: 2024-12-25

## 2024-12-25 RX ORDER — IBUPROFEN 600 MG/1
600 TABLET, FILM COATED ORAL EVERY 6 HOURS PRN
Qty: 30 TABLET | Refills: 0 | Status: SHIPPED | OUTPATIENT
Start: 2024-12-25

## 2024-12-25 RX ORDER — TAMSULOSIN HYDROCHLORIDE 0.4 MG/1
0.4 CAPSULE ORAL
Qty: 30 CAPSULE | Refills: 0 | Status: CANCELLED | OUTPATIENT
Start: 2024-12-25

## 2024-12-25 RX ORDER — TAMSULOSIN HYDROCHLORIDE 0.4 MG/1
0.4 CAPSULE ORAL
Qty: 30 CAPSULE | Refills: 0 | Status: SHIPPED | OUTPATIENT
Start: 2024-12-25 | End: 2024-12-25

## 2024-12-25 RX ORDER — IBUPROFEN 600 MG/1
600 TABLET, FILM COATED ORAL EVERY 6 HOURS PRN
Qty: 30 TABLET | Refills: 0 | Status: SHIPPED | OUTPATIENT
Start: 2024-12-25 | End: 2024-12-25

## 2024-12-25 RX ORDER — LIDOCAINE 50 MG/G
1 PATCH TOPICAL DAILY
Qty: 30 PATCH | Refills: 0 | Status: CANCELLED | OUTPATIENT
Start: 2024-12-25

## 2024-12-25 RX ORDER — IBUPROFEN 600 MG/1
600 TABLET, FILM COATED ORAL EVERY 6 HOURS PRN
Qty: 60 TABLET | Refills: 0 | Status: CANCELLED | OUTPATIENT
Start: 2024-12-25

## 2024-12-25 RX ORDER — OXYCODONE HYDROCHLORIDE 5 MG/1
5 TABLET ORAL EVERY 4 HOURS PRN
Qty: 5 TABLET | Refills: 0 | Status: SHIPPED | OUTPATIENT
Start: 2024-12-25 | End: 2024-12-25

## 2024-12-25 RX ORDER — LIDOCAINE 50 MG/G
1 PATCH TOPICAL DAILY
Start: 2024-12-26

## 2024-12-25 RX ADMIN — DOCUSATE SODIUM 100 MG: 100 CAPSULE, LIQUID FILLED ORAL at 17:19

## 2024-12-25 RX ADMIN — ACETAMINOPHEN 975 MG: 325 TABLET, FILM COATED ORAL at 17:19

## 2024-12-25 RX ADMIN — IBUPROFEN 600 MG: 600 TABLET, FILM COATED ORAL at 01:22

## 2024-12-25 RX ADMIN — LIDOCAINE 1 PATCH: 50 PATCH TOPICAL at 08:58

## 2024-12-25 RX ADMIN — IBUPROFEN 600 MG: 600 TABLET, FILM COATED ORAL at 13:03

## 2024-12-25 RX ADMIN — ACETAMINOPHEN 975 MG: 325 TABLET, FILM COATED ORAL at 04:46

## 2024-12-25 RX ADMIN — OXYCODONE 5 MG: 5 TABLET ORAL at 02:46

## 2024-12-25 RX ADMIN — TAMSULOSIN HYDROCHLORIDE 0.4 MG: 0.4 CAPSULE ORAL at 17:20

## 2024-12-25 RX ADMIN — DOCUSATE SODIUM 100 MG: 100 CAPSULE, LIQUID FILLED ORAL at 08:58

## 2024-12-25 NOTE — PROGRESS NOTES
Progress Note - OB/GYN   Name: Etta Barriga 34 y.o. female I MRN: [id number]  Unit/Bed#: -01 I Date of Admission: 2024   Date of Service: 2024 I Hospital Day: 1     Assessment & Plan   (spontaneous vaginal delivery)  Recovering well   Routine postpartum care, encourage ambulation, encourage familial bonding  Lactation support for breast/bottle feeding as needed  Pre-delivery Hgb 13  FEN: Tolerating regular diet  Pain: Motrin/Tylenol around the clock, oxycodone if needed  Appropriate bowel and bladder function   DVT Ppx: Ambulation  Flank pain  Patient reports pain feels similar to prior kidney stones that she has had  Encourage excellent hydration  Flomax  Lidocaine patches  Consider renal ultrasound    Disposition: Anticipate discharge home postpartum Day #1-2  Barriers to discharge: ongoing couplet care      Subjective/Objective     Subjective: Postpartum state    Pain: no  Tolerating PO: yes  Voiding: yes  Flatus: yes  BM: no  Ambulating: yes  Breastfeeding: Breastfeeding and Bottle feeding  Chest pain: no  Shortness of breath: no  Leg pain: no  Lochia: minimal    Objective:     Vitals:  Vitals:    24 1714 24 1900 24 2314 24 0300   BP: 96/67 103/57 133/80 133/82   BP Location:  Right arm Right arm Right arm   Pulse: (!) 118 88 87 92   Resp:  18 18 18   Temp:  99.5 °F (37.5 °C) 98.1 °F (36.7 °C) 98 °F (36.7 °C)   TempSrc:  Oral Oral Temporal   SpO2:   96% 97%   Weight:       Height:           Physical Exam:   GEN: appears well, alert and oriented x 3, pleasant and cooperative   CV: Regular rate and rhythm  RESP: non labored breathing, lungs clear to auscultation  ABDOMEN: soft, tenderness in right side of abdomen and R CVA tenderness, no abdominal distention, Uterine fundus firm and non-tender, at the umbilicus  EXTREMITIES: non-tender  NEURO Alert and oriented to person, place, and time.       Lab Results   Component Value Date    WBC 9.85 2024    HGB  13.0 12/23/2024    HCT 38.1 12/23/2024    MCV 87 12/23/2024     12/23/2024         Lindsay Mcconnell MD  Obstetrics & Gynecology  12/25/24

## 2024-12-25 NOTE — PLAN OF CARE
Problem: PAIN - ADULT  Goal: Verbalizes/displays adequate comfort level or baseline comfort level  Description: Interventions:  - Encourage patient to monitor pain and request assistance  - Assess pain using appropriate pain scale  - Administer analgesics based on type and severity of pain and evaluate response  - Implement non-pharmacological measures as appropriate and evaluate response  - Consider cultural and social influences on pain and pain management  - Notify physician/advanced practitioner if interventions unsuccessful or patient reports new pain  Outcome: Progressing     Problem: INFECTION - ADULT  Goal: Absence or prevention of progression during hospitalization  Description: INTERVENTIONS:  - Assess and monitor for signs and symptoms of infection  - Monitor lab/diagnostic results  - Monitor all insertion sites, i.e. indwelling lines, tubes, and drains  - Monitor endotracheal if appropriate and nasal secretions for changes in amount and color  - [place] appropriate cooling/warming therapies per order  - Administer medications as ordered  - Instruct and encourage patient and family to use good hand hygiene technique  - Identify and instruct in appropriate isolation precautions for identified infection/condition  Outcome: Progressing  Goal: Absence of fever/infection during neutropenic period  Description: INTERVENTIONS:  - Monitor WBC    Outcome: Progressing     Problem: SAFETY ADULT  Goal: Patient will remain free of falls  Description: INTERVENTIONS:  - Educate patient/family on patient safety including physical limitations  - Instruct patient to call for assistance with activity   - Consult OT/PT to assist with strengthening/mobility   - Keep Call bell within reach  - Keep bed low and locked with side rails adjusted as appropriate  - Keep care items and personal belongings within reach  - Initiate and maintain comfort rounds  - Make Fall Risk Sign visible to staff  - Offer Toileting every  Hours,  in advance of need  - Initiate/Maintain alarm  - Obtain necessary fall risk management equipment:   - Apply yellow socks and bracelet for high fall risk patients  - Consider moving patient to room near nurses station  Outcome: Progressing  Goal: Maintain or return to baseline ADL function  Description: INTERVENTIONS:  -  Assess patient's ability to carry out ADLs; assess patient's baseline for ADL function and identify physical deficits which impact ability to perform ADLs (bathing, care of mouth/teeth, toileting, grooming, dressing, etc.)  - Assess/evaluate cause of self-care deficits   - Assess range of motion  - Assess patient's mobility; develop plan if impaired  - Assess patient's need for assistive devices and provide as appropriate  - Encourage maximum independence but intervene and supervise when necessary  - Involve family in performance of ADLs  - Assess for home care needs following discharge   - Consider OT consult to assist with ADL evaluation and planning for discharge  - Provide patient education as appropriate  Outcome: Progressing  Goal: Maintains/Returns to pre admission functional level  Description: INTERVENTIONS:  - Perform AM-PAC 6 Click Basic Mobility/ Daily Activity assessment daily.  - Set and communicate daily mobility goal to care team and patient/family/caregiver.   - Collaborate with rehabilitation services on mobility goals if consulted  - Perform Range of Motion  times a day.  - Reposition patient every  hours.  - Dangle patient  times a day  - Stand patient  times a day  - Ambulate patient  times a day  - Out of bed to chair  times a day   - Out of bed for meals times a day  - Out of bed for toileting  - Record patient progress and toleration of activity level   Outcome: Progressing     Problem: DISCHARGE PLANNING  Goal: Discharge to home or other facility with appropriate resources  Description: INTERVENTIONS:  - Identify barriers to discharge w/patient and caregiver  - Arrange for  needed discharge resources and transportation as appropriate  - Identify discharge learning needs (meds, wound care, etc.)  - Arrange for interpretive services to assist at discharge as needed  - Refer to Case Management Department for coordinating discharge planning if the patient needs post-hospital services based on physician/advanced practitioner order or complex needs related to functional status, cognitive ability, or social support system  Outcome: Progressing     Problem: Knowledge Deficit  Goal: Patient/family/caregiver demonstrates understanding of disease process, treatment plan, medications, and discharge instructions  Description: Complete learning assessment and assess knowledge base.  Interventions:  - Provide teaching at level of understanding  - Provide teaching via preferred learning methods  Outcome: Progressing     Problem: POSTPARTUM  Goal: Experiences normal postpartum course  Description: INTERVENTIONS:  - Monitor maternal vital signs  - Assess uterine involution and lochia  Outcome: Progressing  Goal: Appropriate maternal -  bonding  Description: INTERVENTIONS:  - Identify family support  - Assess for appropriate maternal/infant bonding   -Encourage maternal/infant bonding opportunities  - Referral to  or  as needed  Outcome: Progressing  Goal: Establishment of infant feeding pattern  Description: INTERVENTIONS:  - Assess breast/bottle feeding  - Refer to lactation as needed  Outcome: Progressing  Goal: Incision(s), wounds(s) or drain site(s) healing without S/S of infection  Description: INTERVENTIONS  - Assess and document dressing, incision, wound bed, drain sites and surrounding tissue  - Provide patient and family education  - Perform skin care/dressing changes every   Outcome: Progressing

## 2024-12-25 NOTE — ASSESSMENT & PLAN NOTE
Patient reports pain feels similar to prior kidney stones that she has had  Encourage excellent hydration  Flomax  Lidocaine patches  Consider renal ultrasound

## 2024-12-25 NOTE — PROGRESS NOTES
"Discharge education reviewed with patient at bedside. Informational packet and \"save your life\" magnet provided. Patient verbalized understanding.   "

## 2024-12-25 NOTE — PLAN OF CARE
Problem: PAIN - ADULT  Goal: Verbalizes/displays adequate comfort level or baseline comfort level  Description: Interventions:  - Encourage patient to monitor pain and request assistance  - Assess pain using appropriate pain scale  - Administer analgesics based on type and severity of pain and evaluate response  - Implement non-pharmacological measures as appropriate and evaluate response  - Consider cultural and social influences on pain and pain management  - Notify physician/advanced practitioner if interventions unsuccessful or patient reports new pain  Outcome: Progressing     Problem: INFECTION - ADULT  Goal: Absence or prevention of progression during hospitalization  Description: INTERVENTIONS:  - Assess and monitor for signs and symptoms of infection  - Monitor lab/diagnostic results  - Monitor all insertion sites, i.e. indwelling lines, tubes, and drains  - Monitor endotracheal if appropriate and nasal secretions for changes in amount and color  - [place] appropriate cooling/warming therapies per order  - Administer medications as ordered  - Instruct and encourage patient and family to use good hand hygiene technique  - Identify and instruct in appropriate isolation precautions for identified infection/condition  Outcome: Progressing  Goal: Absence of fever/infection during neutropenic period  Description: INTERVENTIONS:  - Monitor WBC    Outcome: Progressing     Problem: SAFETY ADULT  Goal: Patient will remain free of falls  Description: INTERVENTIONS:  - Educate patient/family on patient safety including physical limitations  - Instruct patient to call for assistance with activity   - Consult OT/PT to assist with strengthening/mobility   - Keep Call bell within reach  - Keep bed low and locked with side rails adjusted as appropriate  - Keep care items and personal belongings within reach  - Initiate and maintain comfort rounds  - Make Fall Risk Sign visible to staff  - Offer Toileting every  Hours,  in advance of need  - Initiate/Maintain alarm  - Obtain necessary fall risk management equipment:   - Apply yellow socks and bracelet for high fall risk patients  - Consider moving patient to room near nurses station  Outcome: Progressing  Goal: Maintain or return to baseline ADL function  Description: INTERVENTIONS:  -  Assess patient's ability to carry out ADLs; assess patient's baseline for ADL function and identify physical deficits which impact ability to perform ADLs (bathing, care of mouth/teeth, toileting, grooming, dressing, etc.)  - Assess/evaluate cause of self-care deficits   - Assess range of motion  - Assess patient's mobility; develop plan if impaired  - Assess patient's need for assistive devices and provide as appropriate  - Encourage maximum independence but intervene and supervise when necessary  - Involve family in performance of ADLs  - Assess for home care needs following discharge   - Consider OT consult to assist with ADL evaluation and planning for discharge  - Provide patient education as appropriate  Outcome: Progressing  Goal: Maintains/Returns to pre admission functional level  Description: INTERVENTIONS:  - Perform AM-PAC 6 Click Basic Mobility/ Daily Activity assessment daily.  - Set and communicate daily mobility goal to care team and patient/family/caregiver.   - Collaborate with rehabilitation services on mobility goals if consulted  - Perform Range of Motion  times a day.  - Reposition patient every  hours.  - Dangle patient  times a day  - Stand patient  times a day  - Ambulate patient  times a day  - Out of bed to chair  times a day   - Out of bed for meals  times a day  - Out of bed for toileting  - Record patient progress and toleration of activity level   Outcome: Progressing     Problem: DISCHARGE PLANNING  Goal: Discharge to home or other facility with appropriate resources  Description: INTERVENTIONS:  - Identify barriers to discharge w/patient and caregiver  - Arrange for  needed discharge resources and transportation as appropriate  - Identify discharge learning needs (meds, wound care, etc.)  - Arrange for interpretive services to assist at discharge as needed  - Refer to Case Management Department for coordinating discharge planning if the patient needs post-hospital services based on physician/advanced practitioner order or complex needs related to functional status, cognitive ability, or social support system  Outcome: Progressing     Problem: Knowledge Deficit  Goal: Patient/family/caregiver demonstrates understanding of disease process, treatment plan, medications, and discharge instructions  Description: Complete learning assessment and assess knowledge base.  Interventions:  - Provide teaching at level of understanding  - Provide teaching via preferred learning methods  Outcome: Progressing     Problem: POSTPARTUM  Goal: Experiences normal postpartum course  Description: INTERVENTIONS:  - Monitor maternal vital signs  - Assess uterine involution and lochia  Outcome: Progressing  Goal: Appropriate maternal -  bonding  Description: INTERVENTIONS:  - Identify family support  - Assess for appropriate maternal/infant bonding   -Encourage maternal/infant bonding opportunities  - Referral to  or  as needed  Outcome: Progressing  Goal: Establishment of infant feeding pattern  Description: INTERVENTIONS:  - Assess breast/bottle feeding  - Refer to lactation as needed  Outcome: Progressing  Goal: Incision(s), wounds(s) or drain site(s) healing without S/S of infection  Description: INTERVENTIONS  - Assess and document dressing, incision, wound bed, drain sites and surrounding tissue  - Provide patient and family education  - Perform skin care/dressing changes every   Outcome: Progressing

## 2024-12-26 ENCOUNTER — TRANSITIONAL CARE MANAGEMENT (OUTPATIENT)
Dept: FAMILY MEDICINE CLINIC | Facility: CLINIC | Age: 34
End: 2024-12-26

## 2024-12-26 ENCOUNTER — TELEPHONE (OUTPATIENT)
Dept: OBGYN CLINIC | Facility: CLINIC | Age: 34
End: 2024-12-26

## 2024-12-26 NOTE — TELEPHONE ENCOUNTER
----- Message from Radha Hill MD sent at 2024  6:38 PM EST -----  Regarding: Expedited Tubal  [facility] Department  Surgery Scheduling Sheet    Patient Name: Etta Barriga  : 1990    Provider: Radha Hill MD     Needed: no; Language: N/A    Procedure: exam under anesthesia, bilateral salpingectomy    Diagnosis: request for permanent sterilization    Special Needs or Equipment: none    Anesthesia: general    Length of stay: outpatient  Does patient have comorbid conditions that will require close perioperative monitoring prior to safe discharge: no    The patient has comorbid conditions that will require close perioperative monitoring prior to safe discharge, including N/A.   This may require acute care beyond the usual and routine recovery period. As such, inpatient admission post-operatively is expected and appropriate, and anticipated hospital length of stay will be >2 midnights.    Pre-Admission Testing Needed: no   Labs that should be ordered: NA    Order PAT that is recommended in prep for procedure?: Not Indicated    Medical Clearance Needed: no; Provider: N/A    MA Form Signed (tubals/hysterectomy): signed 10/7/2024    Surgical Drink Given: no    How many days out of work: 1 to 2 weeks  How many days no drivin to 5 day, no driving on opioids    Is pre op appt needed?  Pre-op and Post-partum visit can be combined  Interval for post op appt: 2 weeks     For Surgical Scheduler:     Surgery Scheduled On:  [place]:     Pre-op Appt:   Post op Appt:  Consult/Medical clearance appt:

## 2024-12-26 NOTE — UTILIZATION REVIEW
"Mother and baby discharged on 2024      NOTIFICATION OF INPATIENT ADMISSION   MATERNITY/DELIVERY AUTHORIZATION REQUEST   SERVICING FACILITY:   [facility] - L&D, [place], NICU  [address] [place] [address]  Tax ID: [id number]  NPI: [id number] ATTENDING PROVIDER:  Attending Name and NPI#: José Antonio Jama Md [[id number]]  Address: [address] [place] [address]  Phone: [phone]     ADMISSION INFORMATION:  Place of Service: Inpatient [facility]  Place of Service Code: 21  Inpatient Admission Date/Time: 24 12:22 AM  Discharge Date/Time: 2024  6:03 PM  Admitting Diagnosis Code/Description:  Abdominal pain during pregnancy in third trimester [O26.893, R10.9]   Mother: Etta Barriga 1990 Estimated Date of Delivery: 24  Delivering clinician: José Antonio Jama   OB History          3    Para   3    Term   3       0    AB   0    Living   3         SAB   0    IAB   0    Ectopic   0    Multiple   0    Live Births   3               [place] Name & MRN:   Information for the patient's :  Ignacio Barriga, Baby Boy (Etta) [[id number]]   [place] Delivery Information:  Sex: male  Delivered 2024 3:01 PM by Vaginal, Spontaneous; Gestational Age: 39w4d    [place] Measurements:  Weight: 7 lb 7 oz (3374 g);  Height: 20\"    APGAR 1 minute 5 minutes 10 minutes   Totals: 8 9       UTILIZATION REVIEW CONTACT:  Kristal Aguilera, Utilization   Network Utilization Review Department  Phone: [phone]  Fax [phone]  Email: [email or web address]  Contact for approvals/pending authorizations, clinical reviews, and discharge.   PHYSICIAN ADVISORY SERVICES:  Medical Necessity Denial & Xpfd-[id number] Review  Phone: [phone]  Fax: [phone]  Email: [email or web address]   DISCHARGE SUPPORT TEAM:  For Patients Discharge Needs & Updates  Phone: " [phone] opt. 2 Fax: [phone]  Email: [email or web address]

## 2024-12-28 LAB
DME PARACHUTE DELIVERY DATE ACTUAL: NORMAL
DME PARACHUTE DELIVERY DATE REQUESTED: NORMAL
DME PARACHUTE ITEM DESCRIPTION: NORMAL
DME PARACHUTE ORDER STATUS: NORMAL
DME PARACHUTE SUPPLIER NAME: NORMAL
DME PARACHUTE SUPPLIER PHONE: NORMAL

## 2025-01-01 LAB — PLACENTA IN STORAGE: NORMAL

## 2025-01-06 ENCOUNTER — DOCUMENTATION (OUTPATIENT)
Dept: OBGYN CLINIC | Facility: CLINIC | Age: 35
End: 2025-01-06

## 2025-01-09 RX ORDER — IBUPROFEN 600 MG/1
TABLET, FILM COATED ORAL
Qty: 30 TABLET | Refills: 0 | Status: SHIPPED | OUTPATIENT
Start: 2025-01-09

## 2025-01-17 DIAGNOSIS — N20.1 URETEROLITHIASIS: ICD-10-CM

## 2025-01-20 ENCOUNTER — POSTPARTUM VISIT (OUTPATIENT)
Dept: OBGYN CLINIC | Facility: CLINIC | Age: 35
End: 2025-01-20

## 2025-01-20 VITALS
BODY MASS INDEX: 28.05 KG/M2 | DIASTOLIC BLOOD PRESSURE: 75 MMHG | HEART RATE: 81 BPM | SYSTOLIC BLOOD PRESSURE: 113 MMHG | WEIGHT: 173.8 LBS

## 2025-01-20 PROBLEM — R10.9 FLANK PAIN: Status: RESOLVED | Noted: 2024-12-25 | Resolved: 2025-01-20

## 2025-01-20 PROBLEM — Z3A.39 39 WEEKS GESTATION OF PREGNANCY: Status: RESOLVED | Noted: 2024-06-18 | Resolved: 2025-01-20

## 2025-01-20 PROCEDURE — 99213 OFFICE O/P EST LOW 20 MIN: CPT | Performed by: NURSE PRACTITIONER

## 2025-01-20 RX ORDER — TAMSULOSIN HYDROCHLORIDE 0.4 MG/1
0.4 CAPSULE ORAL
Qty: 30 CAPSULE | Refills: 0 | Status: SHIPPED | OUTPATIENT
Start: 2025-01-20 | End: 2025-01-20

## 2025-01-20 NOTE — PATIENT INSTRUCTIONS
Thank you for your confidence in our team.   We appreciate you and welcome your feedback.   If you receive a survey from us, please take a few moments to let us know how we are doing.   Sincerely,  CESAR Vásquez     POSTPARTUM    You should contact your OBGYN provider if you experience any of the followin. Bleeding that soaks a pad every hour for 2 hours.   2. Foul odor coming from your vagina.   3. Fever of 100.4 or higher.   4. Incision or abdominal pain that will not go away despite prescribed pain medications.   5. Swelling, redness, discharge or bleeding from your  incision or perineal tear site.   6. Your incision begins to separate.   7. Problems urinating including inability to urinate, burning while urinating or extremely dark urine.   8. No bowel movement within 4 days of giving birth, or difficulty with bowel movements after that.   9. Any type of visual disturbance (double vision, blurring, etc).   10. Severe headache.   11. Flu-like symptoms.   12. Pain or redness in one or both of your breasts.   13. Pain, warmth, tenderness or swelling in your legs, especially the calf area.   14. Frequent nausea and vomiting.   15. Signs of depression or anxiety.   16. If you experience chest pains or have problems breathing, call 911 immediately.    In general you may resume sexual vaginal intercourse after 6 weeks of delivery.  It is important to continue changing your sanitary pads frequently and clean the perineum at least 2-3 times daily.

## 2025-01-20 NOTE — PROGRESS NOTES
POSTPARTUM VISIT    Etta Barriga presents today for postpartum visit.  She had a vaginal delivery on 12/24/2024.  Complications included none.  She is breast and bottlefeeding her infant and reports no issues with such.  She desires surgical sterilization for contraception and received an injection of Depoprovera on 12/25/2024 prior to hospital discharge.  She was provided with [place] Depression Screening tool and her score was 5.    Review of Systems:   -Constitutional: denies issues, denies pain   -Breasts: denies tenderness   -Gynecologic: lochia pink spotting only at times   -Urinary: denies issues urinating   -GI: stools WNL, denies issues    Physical Exam:   -Vitals:   Vitals:    01/20/25 1551   BP: 113/75   BP Location: Left arm   Patient Position: Sitting   Cuff Size: Standard   Pulse: 81   Weight: 78.8 kg (173 lb 12.8 oz)      -General: A&Ox3, no acute distress noted   -Abdomen: soft, non-tender   -Extremities: nontender, no edema noted   -Breasts: deferred   -Pelvic exam: deferred    Assessment/Plan:  1. Normal postpartum exam.  2. Depression screening negative.  3. Referral ordered for postpartum Physical Therapy consultation.  4. Last pap smear was done 10/25/2023 and result was NILM with negative HPV.  Advise return for next annual GYN exam in 11/2025.  5. Contraception: Desires surgical sterilization.  MA31 signed 10/7/2024.  Dr. Toussaint-Foster in to sign surgical consent with patient now and procedure is scheduled for 3/7/2025.

## 2025-01-29 NOTE — PRE-PROCEDURE INSTRUCTIONS
Pre-Surgery Instructions:   Medication Instructions    acetaminophen (TYLENOL) 325 mg tablet Uses PRN- OK to take day of surgery    ibuprofen (MOTRIN) 600 mg tablet Stop taking 3 days prior to surgery.    Multiple Vitamin (MULTI VITAMIN PO) Stop taking 7 days prior to surgery.    Prenatal Vit-Fe Fumarate-FA (Prenatal Plus Vitamin/Mineral) 27-1 MG TABS Hold day of surgery.   Medication instructions for day surgery reviewed. Please use only a sip of water to take your instructed medications. Avoid all over the counter vitamins, supplements and NSAIDS for one week prior to surgery per anesthesia guidelines. Tylenol is ok to take as needed.     You will receive a call one business day prior to surgery with an arrival time and hospital directions. If your surgery is scheduled on a Monday, the hospital will be calling you on the Friday prior to your surgery. If you have not heard from anyone by 8pm, please call the hospital supervisor through the hospital  at [phone]. ([place] [phone] or [place] [phone]).    Do not eat or drink anything after midnight the night before your surgery, including candy, mints, lifesavers, or chewing gum. Do not drink alcohol 24hrs before your surgery. Try not to smoke at least 24hrs before your surgery.       Follow the pre surgery showering instructions as listed in the “My Surgical Experience Booklet” or otherwise provided by your surgeon's office. Do not use a blade to shave the surgical area 1 week before surgery. It is okay to use a clean electric clippers up to 24 hours before surgery. Do not apply any lotions, creams, including makeup, cologne, deodorant, or perfumes after showering on the day of your surgery. Do not use dry shampoo, hair spray, hair gel, or any type of hair products.     No contact lenses, eye make-up, or artificial eyelashes. Remove nail polish, including gel polish, and any artificial, gel, or acrylic nails if possible. Remove all jewelry  including rings and body piercing jewelry.     Wear causal clothing that is easy to take on and off. Consider your type of surgery.    Keep any valuables, jewelry, piercings at home. Please bring any specially ordered equipment (sling, braces) if indicated.    Arrange for a responsible person to drive you to and from the hospital on the day of your surgery. Please confirm the visitor policy for the day of your procedure when you receive your phone call with an arrival time.     Call the surgeon's office with any new illnesses, exposures, or additional questions prior to surgery.    Please reference your “My Surgical Experience Booklet” for additional information to prepare for your upcoming surgery.

## 2025-01-30 ENCOUNTER — EVALUATION (OUTPATIENT)
Dept: PHYSICAL THERAPY | Facility: REHABILITATION | Age: 35
End: 2025-01-30
Payer: COMMERCIAL

## 2025-01-30 PROCEDURE — 97161 PT EVAL LOW COMPLEX 20 MIN: CPT

## 2025-01-30 PROCEDURE — 97110 THERAPEUTIC EXERCISES: CPT

## 2025-01-30 NOTE — PROGRESS NOTES
PT Evaluation    Today's date: 2025  Patient name: Etta Barriga  : 1990  MRN: [id number]  Referring provider: Darcie Ramirez CR*  Dx:   Encounter Diagnosis     ICD-10-CM    1. Postpartum care and examination  Z39.2 Ambulatory Referral to Physical Therapy          Start Time: 1030  Stop Time: 1130  Total time in clinic (min): 60 minutes    Assessment    Assessment details: Etta Barriga is a 34 y.o.  PP female being seen for IE of PT for PP recovery care. Patient's current symptoms include: moderate levels of mid thoracic pain and core deficits. Pt presented w/ mod restrictions in spinal flexion and extension w/ high levels of kinesiophobia. These impairments contribute to the following functional limitations: decreased tolerance to extended walking, extended standing, exercise, lifting, increased pt distress, transfer function, pain during transferring/changing child, and impaired QOL. Etta Barriga is a good candidate for physical therapy and would benefit from skilled physical therapy to guide progressive interventions to address the above impairments in order to allow the patient to achieve the goals listed and return to prior level of function. POC: breathing mechanics, functional strengthening, abdominal strengthening, ergonomic training, and spinal mobility training.     Patient also educated on diagnosis, plan of care and prognosis. Pt is in agreement with recommended plan of care and goals for therapy, and demonstrates motivation for active participation in proposed plan of care.        Goals  Function:   In 5 weeks, patient will report at least 50% improvement in subjective sxs presentation since start of PT care.   In 10 weeks, patient will report at least 90% improvement in subjective sxs presentation since start of PT care.   In 10 weeks, pt will report improvement in tolerance to standing for 3 hours or greater to indicate improved readiness for  work with no pain to.     Pain:   In 5 weeks, patient will report 3/10 or less midthoracic pain with only supine to sit transfers.  In 5 weeks, patient will report painfree intravaginal PFM exam.  In 5 weeks, pt will be able to perform 5 consecutive deep breaths with proper TA activation to indicate improved core function.        In 10 weeks, patient will report 1/10 or less mid thoracic pain with w/ all transfer.  In 10 weeks, patient will be able to perform 30 min of exercise w/ no pain and no cues required for proper breathing mechanics to indicate improved abdominal pressure mechanics and activity tolerance.             Plan    Frequency: 1x week  Duration in weeks: 10  Plan of Care beginning date: 2025  Plan of Care expiration date: 4/10/2025        Subjective    Chief Complaint: Mid Thorcic Pain   HPI: Etta Barriga is a 34 y.o.  female who presents 5 weeks s/p Vaginal delivery. Baby Shellie was born at 39 weeks gestation and weighed 7lb 7oz.   Occupation: [employer] (Returning March - long periods of standing and walking)       Urinary:     Currently presenting with: MARK noted 2 weeks after, but has resolved now.   Fluid intake: Water 28 oz  Denies: MARK , UUI, dysuria, hesitancy, nocturia, and nocturnal enuresis    Bowel:     Evacuates daily.   Denies straining, bleeding, constipation, fecal smearing, and painful evacuation   GYN:      with vaginal deliveries.   Menstruation: Not yet,   Lochia: Occasional staining still as of 2 days ago    Sexual Function:     Sexually Active: Not yet, cleared at 6 weeks   MSK:      Current exercise: Not currently because of time constraints and pain.    Pain:      Mid thoracic pain  0/10 current; 0/10 at best; 7/10 at worst, Feels like it gets exacerbated during changing, walking, extended sitting, extended standing, supine to sit transfer   Previous Tx: PT in the past (during pregnancy)    Goals:     Improve back pain   Improve core strength   Improve  "endurance          Objective    Spinal assessment:  Flexion: Mod restriction and pain in bilateral paraspinals mid thoracic, kinesiophobia and valsalva   Extension: WFL and \" good stretching, kinesiophobia\"   R lateral flexion: WFL  L lateral flexion: WFL  PAVIMS: High guarding and pain w/ Grade 1 throughout thoracic spine     Functional assessment:  Squat: wide leg stance, Limited ROM, No pelvic tilt   Single-leg stance: mild instability, no LOB, kinesiophobia   Static posture: Mod rounded shoulders, mod forward hear posture  Notes: Valsalva w/ most transfers present     Neuro screen:  Reflexes: L3-4 intact bilaterally L5-S1 intact bilaterally  Clonus: neg  Dermatomes:  WFL   Slump: neg bilaterally  Mytomes: At least 3+/5 throughout     Abdominal Assessment:   RUQ: No TTP  LUQ: No TTP  RLQ: No TTP   LLQ: No TTP   Scars present: no  Curl up: 2 cm width x 0 cm height x 0 cm length x 1 cm depth, mod abdominal doming  Abdominal Brace: mod valsalva, but good aproximationg, unable to perform w/ proper breathing mechanics  Palpation of linea alba:      Hip MMTs:  Flexion: R 3/5 L 3/5 (high levels of pain)    Hip PROM:  Unable to assess due to high levels of guarding              Precautions: Standard, PP      Diagnosis:    POC expires (Date that your POC expires) Auth Status? (BOMN, approved, pending) Unit limit (Daily) Auth Start date Expiration date PT/OT + Visit Limit?   4/10/2025      Auth after 24     Date of Service 1/30        Visits Used 1        Visits Remaining 23        [facility] Created                  Neuro Re-Ed         Urge deferral         Defecation mechanics         Breathing Mechanics         PFM Coordination         PFM Down Training         Internal Cueing          Biofeedback                  Ther Ex         PFM Strengthening         Hip strengthening         Functional Strengthening         Abdominal Strengthening         Aerobic         Therapeutic Rest Breaks         Mobility  Child's pose 1' "     Butterfly w/ DB 1'     Prone press to elbows x10     Thread the needle x10  ** thoracic extension        High Impact         UE Strengthening                   Ther Activity         Voiding Diaries         Review of sxs         Fluid Intake         Ergonomics  **                Manual Ther         PFM exam         Ortho exam Performed  MMT if tolerated        Dynamometer Testing         Fascial Decompression         Bowel Massage                  Modalities                           Outcome Measure

## 2025-02-04 ENCOUNTER — APPOINTMENT (OUTPATIENT)
Dept: PHYSICAL THERAPY | Facility: REHABILITATION | Age: 35
End: 2025-02-04
Payer: COMMERCIAL

## 2025-02-04 NOTE — PROGRESS NOTES
Daily Note     Today's date: 2025  Patient name: Etta Barriga  : 1990  MRN: [id number]  Referring provider: Darcie Ramirez CR*  Dx: No diagnosis found.               Chief Complaint: Mid Thorcic Pain   HPI: Etta Barriga is a 34 y.o.  female who presents 5 weeks s/p Vaginal delivery. Baby Shellie was born at 39 weeks gestation and weighed 7lb 7oz. Pt presents w/ high levels of mid thoracic pain limiting walking, sitting, standing, transfers, carrying child and changing child.   Occupation: [employer] (Returning March - long periods of standing and walking)         Subjective: ***      Objective: See treatment diary below      Assessment: Tolerated treatment well. Patient would benefit from continued PT.      Plan: Continue per plan of care       Precautions: Standard, PP      Diagnosis:    POC expires (Date that your POC expires) Auth Status? (BOMN, approved, pending) Unit limit (Daily) Auth Start date Expiration date PT/OT + Visit Limit?   4/10/2025      Auth after 24     Date of Service         Visits Used 1        Visits Remaining 23        [facility] Created                  Neuro Re-Ed         Urge deferral         Defecation mechanics         Breathing Mechanics         PFM Coordination         PFM Down Training         Internal Cueing          Biofeedback                  Ther Ex         PFM Strengthening         Hip strengthening         Functional Strengthening         Abdominal Strengthening         Aerobic         Therapeutic Rest Breaks         Mobility  Child's pose 1'     Butterfly w/ DB 1'     Prone press to elbows x10     Thread the needle x10  ** thoracic extension        High Impact         UE Strengthening                   Ther Activity         Voiding Diaries         Review of sxs         Fluid Intake         Ergonomics  **                Manual Ther         PFM exam         Ortho exam Performed  MMT if tolerated        Dynamometer Testing          Fascial Decompression         Bowel Massage                  Modalities                           Outcome Measure

## 2025-02-05 ENCOUNTER — ANESTHESIA EVENT (OUTPATIENT)
Dept: PERIOP | Facility: HOSPITAL | Age: 35
End: 2025-02-05
Payer: COMMERCIAL

## 2025-02-06 ENCOUNTER — CONSULT (OUTPATIENT)
Dept: OBGYN CLINIC | Facility: CLINIC | Age: 35
End: 2025-02-06

## 2025-02-06 VITALS — SYSTOLIC BLOOD PRESSURE: 124 MMHG | BODY MASS INDEX: 28.89 KG/M2 | WEIGHT: 179 LBS | DIASTOLIC BLOOD PRESSURE: 60 MMHG

## 2025-02-06 DIAGNOSIS — Z01.818 PREOP EXAMINATION: Primary | ICD-10-CM

## 2025-02-06 DIAGNOSIS — Z30.2 REQUEST FOR STERILIZATION: ICD-10-CM

## 2025-02-06 PROCEDURE — 99213 OFFICE O/P EST LOW 20 MIN: CPT | Performed by: OBSTETRICS & GYNECOLOGY

## 2025-02-06 NOTE — ANESTHESIA PREPROCEDURE EVALUATION
Procedure:  SALPINGECTOMY, LAP  BILATERAL & EUA (Bilateral: Uterus)    HLD  Physical Exam    Airway    Mallampati score: III         Dental   No notable dental hx     Cardiovascular  Rhythm: regular, Rate: normal, Cardiovascular exam normal    Pulmonary  Pulmonary exam normal Breath sounds clear to auscultation    Other Findings  post-pubertal.      Anesthesia Plan  ASA Score- 2     Anesthesia Type- general with ASA Monitors.         Additional Monitors:     Airway Plan: ETT.           Plan Factors-    Chart reviewed.   Existing labs reviewed. Patient summary reviewed.                  Induction- intravenous.    Postoperative Plan-         Informed Consent- Anesthetic plan and risks discussed with patient.        NPO Status:  No vitals data found for the desired time range.

## 2025-02-06 NOTE — PROGRESS NOTES
Assessment Etta was seen today for pre-op exam.    Diagnoses and all orders for this visit:    Preop examination    Request for sterilization         Plan    Etta Barriga is scheduled for  EUA, Diagnostic Laparoscopic, Bilateral Salpingectomy  on 25. Pre-op instructions, including showering with Hibiclens, discussed with patient and patient received paper copy.     The risks, benefits and alternatives to the procedure were discussed.  We discussed the risks of pain, bleeding, blood clot, infection, allergic reaction, neurovascular injury, injury to uterus, injury to surrounding structures such as bowel, bladder and/or ureters, and possibility of inability to complete the procedure.  We discussed code status - full code.  Blood transfusion is acceptable.  We discussed resident physician participation in the procedure, including pelvic exam.  All questions answered, consent obtained.        Subjective     Etta Barriga is a 34 y.o. female here for a pre-op consultation. She is without complaint today.       Patient Active Problem List   Diagnosis   (none) - all problems resolved or deleted         Gynecologic History  Patient's last menstrual period was 03/15/2024 (approximate).  Contraception: condoms  Last Pap: 10/25/23. Results were: normal      Obstetric History  OB History    Para Term  AB Living   3 3 3 0 0 3   SAB IAB Ectopic Multiple Live Births   0 0 0 0 3      # Outcome Date GA Lbr Rory/2nd Weight Sex Type Anes PTL Lv   3 Term 24 39w4d / 00:12 3374 g (7 lb 7 oz) M Vag-Spont EPI N ANITRA   2 Term 20 41w0d / 00:08 3856 g (8 lb 8 oz) M Vag-Spont EPI  ANITRA   1 Term 18 40w0d  3374 g (7 lb 7 oz) F Vag-Spont EPI N ANITRA      Birth Comments: FOB1       Past Medical/Surgical/Family/Social History    Past Medical History:   Diagnosis Date    Hyperlipidemia     Kidney stone      Past Surgical History:   Procedure Laterality Date    NO PAST SURGERIES       Family  History   Problem Relation Age of Onset    No Known Problems Mother     No Known Problems Father     No Known Problems Daughter     Cancer Neg Hx     Breast cancer Neg Hx     Colon cancer Neg Hx     Ovarian cancer Neg Hx     Diabetes Neg Hx      Social History     Socioeconomic History    Marital status: /Civil Union     Spouse name: Not on file    Number of children: Not on file    Years of education: Not on file    Highest education level: Not on file   Occupational History    Not on file   Tobacco Use    Smoking status: Never     Passive exposure: Never    Smokeless tobacco: Never   Vaping Use    Vaping status: Never Used   Substance and Sexual Activity    Alcohol use: Not Currently     Comment: couple times/month    Drug use: Never    Sexual activity: Yes     Partners: Male     Birth control/protection: None   Other Topics Concern    Not on file   Social History Narrative    Not on file     Social Drivers of Health     Financial Resource Strain: Low Risk  (12/5/2024)    Overall Financial Resource Strain (CARDIA)     Difficulty of Paying Living Expenses: Not hard at all   Food Insecurity: No Food Insecurity (12/25/2024)    Nursing - Inadequate Food Risk Classification     Worried About Running Out of Food in the Last Year: Never true     Ran Out of Food in the Last Year: Never true     Ran Out of Food in the Last Year: Never true   Transportation Needs: No Transportation Needs (12/25/2024)    Nursing - Transportation Risk Classification     Lack of Transportation: Not on file     Lack of Transportation: No   Physical Activity: Not on file   Stress: Not on file   Social Connections: Not on file   Intimate Partner Violence: Unknown (12/25/2024)    Nursing IPS     Feels Physically and Emotionally Safe: Not on file     Physically Hurt by Someone: Not on file     Humiliated or Emotionally Abused by Someone: Not on file     Physically Hurt by Someone: No     Hurt or Threatened by Someone: No   Housing Stability:  Unknown (2024)    Nursing: Inadequate Housing Risk Classification     Has Housing: Not on file     Worried About Losing Housing: Not on file     Unable to Get Utilities: Not on file     Unable to Pay for Housing in the Last Year: No     Has Housin         Patient has no known allergies.    Current Outpatient Medications:     acetaminophen (TYLENOL) 325 mg tablet, Take 3 tablets (975 mg total) by mouth every 6 (six) hours, Disp: 360 tablet, Rfl: 0    ibuprofen (MOTRIN) 600 mg tablet, take 1 tablet by mouth every 6 hours if needed for mild pain, Disp: 30 tablet, Rfl: 0    Multiple Vitamin (MULTI VITAMIN PO), Take by mouth in the morning, Disp: , Rfl:     Prenatal Vit-Fe Fumarate-FA (Prenatal Plus Vitamin/Mineral) 27-1 MG TABS, Take 1 tablet by mouth in the morning, Disp: 90 tablet, Rfl: 4      Review of Systems  Constitutional: no fever, feels well  CV: No complaints of chest pain, palpitations  Pulm: No shortness of breath or dyspnea on exertion  Gastrointestinal: no complaints of abdominal pain, nausea  Genitourinary : no complaints of dysuria, vaginal discharge       Objective     /60 (BP Location: Right arm, Patient Position: Sitting, Cuff Size: Standard)   Wt 81.2 kg (179 lb)   LMP 03/15/2024 (Approximate)   Breastfeeding No   BMI 28.89 kg/m²     GEN: The patient was alert and oriented x3, pleasant well-appearing female in no acute distress.   CV: Regular rate and rhythm  PULM: nonlabored respirations, CTAB  ABD: BS positive, soft, nontender  : deferred  EXT: No calf tenderness  MSK: Normal gait  Skin: warm, dry  Neuro: no focal deficits  Psych: normal affect and judgement, cooperative     No changes since last visit.

## 2025-02-07 ENCOUNTER — HOSPITAL ENCOUNTER (OUTPATIENT)
Facility: HOSPITAL | Age: 35
Setting detail: OUTPATIENT SURGERY
Discharge: HOME | End: 2025-02-07
Attending: OBSTETRICS & GYNECOLOGY | Admitting: OBSTETRICS & GYNECOLOGY
Payer: COMMERCIAL

## 2025-02-07 ENCOUNTER — ANESTHESIA (OUTPATIENT)
Dept: PERIOP | Facility: HOSPITAL | Age: 35
End: 2025-02-07
Payer: COMMERCIAL

## 2025-02-07 VITALS
TEMPERATURE: 97.8 F | HEIGHT: 66 IN | HEART RATE: 75 BPM | WEIGHT: 178.35 LBS | RESPIRATION RATE: 18 BRPM | OXYGEN SATURATION: 100 % | SYSTOLIC BLOOD PRESSURE: 120 MMHG | BODY MASS INDEX: 28.66 KG/M2 | DIASTOLIC BLOOD PRESSURE: 56 MMHG

## 2025-02-07 DIAGNOSIS — Z98.51 S/P TUBAL LIGATION: Primary | ICD-10-CM

## 2025-02-07 DIAGNOSIS — Z30.2 ENCOUNTER FOR STERILIZATION: ICD-10-CM

## 2025-02-07 LAB
EXT PREGNANCY TEST URINE: NEGATIVE
EXT. CONTROL: NORMAL

## 2025-02-07 PROCEDURE — 88302 TISSUE EXAM BY PATHOLOGIST: CPT | Performed by: PATHOLOGY

## 2025-02-07 PROCEDURE — 58661 LAPAROSCOPY REMOVE ADNEXA: CPT | Performed by: OBSTETRICS & GYNECOLOGY

## 2025-02-07 PROCEDURE — 81025 URINE PREGNANCY TEST: CPT | Performed by: OBSTETRICS & GYNECOLOGY

## 2025-02-07 RX ORDER — LIDOCAINE HYDROCHLORIDE 20 MG/ML
INJECTION, SOLUTION EPIDURAL; INFILTRATION; INTRACAUDAL; PERINEURAL AS NEEDED
Status: DISCONTINUED | OUTPATIENT
Start: 2025-02-07 | End: 2025-02-07

## 2025-02-07 RX ORDER — DEXAMETHASONE SODIUM PHOSPHATE 10 MG/ML
INJECTION, SOLUTION INTRAMUSCULAR; INTRAVENOUS AS NEEDED
Status: DISCONTINUED | OUTPATIENT
Start: 2025-02-07 | End: 2025-02-07

## 2025-02-07 RX ORDER — SODIUM CHLORIDE, SODIUM LACTATE, POTASSIUM CHLORIDE, CALCIUM CHLORIDE 600; 310; 30; 20 MG/100ML; MG/100ML; MG/100ML; MG/100ML
125 INJECTION, SOLUTION INTRAVENOUS CONTINUOUS
Status: DISCONTINUED | OUTPATIENT
Start: 2025-02-07 | End: 2025-02-07 | Stop reason: HOSPADM

## 2025-02-07 RX ORDER — KETOROLAC TROMETHAMINE 30 MG/ML
30 INJECTION, SOLUTION INTRAMUSCULAR; INTRAVENOUS ONCE
Status: COMPLETED | OUTPATIENT
Start: 2025-02-07 | End: 2025-02-07

## 2025-02-07 RX ORDER — SODIUM CHLORIDE, SODIUM LACTATE, POTASSIUM CHLORIDE, CALCIUM CHLORIDE 600; 310; 30; 20 MG/100ML; MG/100ML; MG/100ML; MG/100ML
INJECTION, SOLUTION INTRAVENOUS CONTINUOUS PRN
Status: DISCONTINUED | OUTPATIENT
Start: 2025-02-07 | End: 2025-02-07

## 2025-02-07 RX ORDER — BUPIVACAINE HYDROCHLORIDE 2.5 MG/ML
INJECTION, SOLUTION EPIDURAL; INFILTRATION; INTRACAUDAL AS NEEDED
Status: DISCONTINUED | OUTPATIENT
Start: 2025-02-07 | End: 2025-02-07 | Stop reason: HOSPADM

## 2025-02-07 RX ORDER — OXYCODONE HYDROCHLORIDE 5 MG/1
5 TABLET ORAL EVERY 4 HOURS PRN
Qty: 5 TABLET | Refills: 0 | Status: SHIPPED | OUTPATIENT
Start: 2025-02-07

## 2025-02-07 RX ORDER — ONDANSETRON 2 MG/ML
4 INJECTION INTRAMUSCULAR; INTRAVENOUS EVERY 6 HOURS PRN
Status: DISCONTINUED | OUTPATIENT
Start: 2025-02-07 | End: 2025-02-07 | Stop reason: HOSPADM

## 2025-02-07 RX ORDER — HYDROMORPHONE HCL/PF 1 MG/ML
0.5 SYRINGE (ML) INJECTION
Status: DISCONTINUED | OUTPATIENT
Start: 2025-02-07 | End: 2025-02-07 | Stop reason: HOSPADM

## 2025-02-07 RX ORDER — IBUPROFEN 600 MG/1
600 TABLET, FILM COATED ORAL EVERY 6 HOURS PRN
Qty: 30 TABLET | Refills: 0 | Status: SHIPPED | OUTPATIENT
Start: 2025-02-07

## 2025-02-07 RX ORDER — ROCURONIUM BROMIDE 10 MG/ML
INJECTION, SOLUTION INTRAVENOUS AS NEEDED
Status: DISCONTINUED | OUTPATIENT
Start: 2025-02-07 | End: 2025-02-07

## 2025-02-07 RX ORDER — ACETAMINOPHEN 325 MG/1
975 TABLET ORAL EVERY 6 HOURS PRN
Status: DISCONTINUED | OUTPATIENT
Start: 2025-02-07 | End: 2025-02-07 | Stop reason: HOSPADM

## 2025-02-07 RX ORDER — ONDANSETRON 2 MG/ML
INJECTION INTRAMUSCULAR; INTRAVENOUS AS NEEDED
Status: DISCONTINUED | OUTPATIENT
Start: 2025-02-07 | End: 2025-02-07

## 2025-02-07 RX ORDER — ONDANSETRON 2 MG/ML
4 INJECTION INTRAMUSCULAR; INTRAVENOUS ONCE AS NEEDED
Status: DISCONTINUED | OUTPATIENT
Start: 2025-02-07 | End: 2025-02-07 | Stop reason: HOSPADM

## 2025-02-07 RX ORDER — FENTANYL CITRATE/PF 50 MCG/ML
25 SYRINGE (ML) INJECTION
Status: DISCONTINUED | OUTPATIENT
Start: 2025-02-07 | End: 2025-02-07 | Stop reason: HOSPADM

## 2025-02-07 RX ORDER — MAGNESIUM HYDROXIDE 1200 MG/15ML
LIQUID ORAL AS NEEDED
Status: DISCONTINUED | OUTPATIENT
Start: 2025-02-07 | End: 2025-02-07 | Stop reason: HOSPADM

## 2025-02-07 RX ORDER — FENTANYL CITRATE 50 UG/ML
INJECTION, SOLUTION INTRAMUSCULAR; INTRAVENOUS AS NEEDED
Status: DISCONTINUED | OUTPATIENT
Start: 2025-02-07 | End: 2025-02-07

## 2025-02-07 RX ORDER — MIDAZOLAM HYDROCHLORIDE 2 MG/2ML
INJECTION, SOLUTION INTRAMUSCULAR; INTRAVENOUS AS NEEDED
Status: DISCONTINUED | OUTPATIENT
Start: 2025-02-07 | End: 2025-02-07

## 2025-02-07 RX ORDER — PROPOFOL 10 MG/ML
INJECTION, EMULSION INTRAVENOUS AS NEEDED
Status: DISCONTINUED | OUTPATIENT
Start: 2025-02-07 | End: 2025-02-07

## 2025-02-07 RX ADMIN — SODIUM CHLORIDE, SODIUM LACTATE, POTASSIUM CHLORIDE, AND CALCIUM CHLORIDE: .6; .31; .03; .02 INJECTION, SOLUTION INTRAVENOUS at 08:20

## 2025-02-07 RX ADMIN — ONDANSETRON 4 MG: 2 INJECTION INTRAMUSCULAR; INTRAVENOUS at 08:20

## 2025-02-07 RX ADMIN — FENTANYL CITRATE 50 MCG: 50 INJECTION INTRAMUSCULAR; INTRAVENOUS at 08:06

## 2025-02-07 RX ADMIN — SUGAMMADEX 200 MG: 100 INJECTION, SOLUTION INTRAVENOUS at 08:23

## 2025-02-07 RX ADMIN — SODIUM CHLORIDE, SODIUM LACTATE, POTASSIUM CHLORIDE, AND CALCIUM CHLORIDE: .6; .31; .03; .02 INJECTION, SOLUTION INTRAVENOUS at 07:35

## 2025-02-07 RX ADMIN — ROCURONIUM 50 MG: 50 INJECTION, SOLUTION INTRAVENOUS at 07:42

## 2025-02-07 RX ADMIN — PROPOFOL 200 MG: 10 INJECTION, EMULSION INTRAVENOUS at 07:42

## 2025-02-07 RX ADMIN — LIDOCAINE HYDROCHLORIDE 80 MG: 20 INJECTION, SOLUTION EPIDURAL; INFILTRATION; INTRACAUDAL at 07:42

## 2025-02-07 RX ADMIN — FENTANYL CITRATE 50 MCG: 50 INJECTION INTRAMUSCULAR; INTRAVENOUS at 07:42

## 2025-02-07 RX ADMIN — ACETAMINOPHEN 975 MG: 325 TABLET, FILM COATED ORAL at 09:46

## 2025-02-07 RX ADMIN — DEXAMETHASONE SODIUM PHOSPHATE 10 MG: 10 INJECTION INTRAMUSCULAR; INTRAVENOUS at 08:07

## 2025-02-07 RX ADMIN — MIDAZOLAM 2 MG: 1 INJECTION INTRAMUSCULAR; INTRAVENOUS at 07:35

## 2025-02-07 RX ADMIN — SODIUM CHLORIDE, SODIUM LACTATE, POTASSIUM CHLORIDE, AND CALCIUM CHLORIDE 125 ML/HR: .6; .31; .03; .02 INJECTION, SOLUTION INTRAVENOUS at 06:29

## 2025-02-07 RX ADMIN — KETOROLAC TROMETHAMINE 30 MG: 30 INJECTION, SOLUTION INTRAMUSCULAR; INTRAVENOUS at 08:52

## 2025-02-07 NOTE — ANESTHESIA POSTPROCEDURE EVALUATION
Post-Op Assessment Note    CV Status:  Stable    Pain management: adequate       Mental Status:  Alert and awake   Hydration Status:  Euvolemic   PONV Controlled:  Controlled   Airway Patency:  Patent     Post Op Vitals Reviewed: Yes    No anethesia notable event occurred.    Staff: CRNA           Last Filed PACU Vitals:  Vitals Value Taken Time   Temp 98.2 °F (36.8 °C) 02/07/25 0835   Pulse 92 02/07/25 0837   /61 02/07/25 0835   Resp 17 02/07/25 0835   SpO2 100 % 02/07/25 0837   Vitals shown include unfiled device data.    Modified Nate:     Vitals Value Taken Time   Activity 2 02/07/25 0835   Respiration 2 02/07/25 0835   Circulation 2 02/07/25 0835   Consciousness 1 02/07/25 0835   Oxygen Saturation 1 02/07/25 0835     Modified Nate Score: 8

## 2025-02-07 NOTE — ADDENDUM NOTE
Addendum  created 02/07/25 1331 by Tarik Santos MD    Attestation recorded in Intraprocedure, Flowsheet accepted, Intraprocedure Attestations deleted, Intraprocedure Attestations filed

## 2025-02-07 NOTE — ANESTHESIA POSTPROCEDURE EVALUATION
Post-Op Assessment Note    Last Filed PACU Vitals:  Vitals Value Taken Time   Temp 98 °F (36.7 °C) 02/07/25 0905   Pulse 64 02/07/25 0905   /71 02/07/25 0905   Resp 17 02/07/25 0905   SpO2 99 % 02/07/25 0905       Modified Nate:     Vitals Value Taken Time   Activity 2 02/07/25 0905   Respiration 2 02/07/25 0905   Circulation 2 02/07/25 0905   Consciousness 1 02/07/25 0905   Oxygen Saturation 2 02/07/25 0905     Modified Nate Score: 9

## 2025-02-07 NOTE — OP NOTE
OPERATIVE REPORT  PATIENT NAME: Etta Barriga    :  1990  MRN: [id number]  Pt Location: [facility] OR ROOM 04    SURGERY DATE: 2025    Surgeons and Role:     * Yardlie Toussaint-Foster, DO - Primary     * Shaunna Chaudhari MD - Assisting    Preop Diagnosis:  Encounter for sterilization [Z30.2]    Post-Op Diagnosis Codes:     * Encounter for sterilization [Z30.2]    Procedure(s) (LRB):  DIAGNOSTIC LAP BILATERAL SALPINGECTOMY & EUA (Bilateral)    Specimen(s):  ID Type Source Tests Collected by Time Destination   1 : Bilateral Fallopian Tubes Tissue Fallopian Tubes, Bilateral TISSUE EXAM Yardlie Toussaint-Foster, DO 2025 0823        Surgical EBL:  Minimal     Drains:  Mccoy catheter 400 mL     IV Fluids:   1200 mL LR     Anesthesia Type:   General, ET     Operative Indications:  Encounter for sterilization [Z30.2]    Operative Findings:  Normal-appearing external female genitalia  Multiparous appearing cervix without lacerations or lesions, hemostatic tenaculum sites following manipulator removal  Grossly normal appearing fallopian tubes   Scattered small vesicles on the anterior surface of uterus consistent with endometriosis  Small amount of scarring on the right ovarian fossa  Scattered vesicular implants on the vesicouterine serosa  Scarring of the left uterosacral with Puneet-Masters window on the left side  Stage 2 endometriosis by AAGL criteria       Complications:   None Apparent     Procedure and Technique:  Brief History    All risks, benefits, and alternatives to the procedure were discussed with the patient and she had the opportunity to ask questions.  The risk of regret of procedure was also addressed with the patient and options for LARC was discussed. Patient expressed desire to continue with tubal sterilization. Informed consent was obtained.     Description of Procedure    Patient was taken to the operating room. General endotracheal anesthesia (GET) was administered and the  patient was positioned on the OR table in the dorsal lithotomy position. All pressure points were padded and a alex hugger was placed to maintain control of core body temperature. The patient was prepped and draped in the usual sterile fashion with chloroprep on the abdomen and chlorhexidine prep on the vagina and perineum.    Operative Technique    A time out was performed to confirm correct patient and correct procedure. A salinas catheter was introduced into the bladder/ A weighted speculum was inserted into the vagina and used to visualize the anterior lip of the cervix, which was then grasped with a single toothed tenaculum. A cone uterine manipulator was inserted into the cervix and secured to the tenaculum. The speculum was removed from the vagina. Sterile gloves were then exchanged and attention was turned to the abdomen.     Local was injected and a 5mm incision was made at the inferior edge of the umbilicus for introduction of a 5mm trocar. Trocar was introduced under direct visualization. Pneumoperitoneum was then established to a maximum of 15mmHg. The entire abdomen and pelvis was inspected and there was no evidence of injury to bowel, bladder, vasculature, or other structures. Attention was then turned to the pelvis.    Patient was placed in Trendelenburg and the uterus was elevated to visualize the fallopian tubes. There was noted to be grossly normal tubes and ovaries bilaterally. Two additional port sites were selected in the left and right lower abdomen approximately 2cm superior and medial to the iliac crests.  A 5mm incision was made for introduction of a 5mm trocar under direct visualization at each site. A blunt probe was inserted through this port and used to visualize the fimbriated ends of the tubes.    The right fallopian tube was grasped at its fimbriated end with a blunt grasper and elevated to visualize the mesosalpinx. The Ligasure device was used to ligate along the mesosalpinx, working  proximally and taking care to avoid ovarian vasculature. Approximately 2cm from the cornua, the Enseal was used to amputate fallopian tube. This was then withdrawn from the abdominal cavity and sent for pathology. Attention was then turned to the contralateral tube, which was amputated in similar fashion. Good hemostasis was confirmed following salpingectomy.    Following salpingectomy, pneumoperitoneum was allowed to escape. Adequate hemostasis was visualized. The inferior trocars were removed under direct visualization. The laparoscope was withdrawn from the abdomen, followed by its trocar sleeve at the umbilicus. Skin incisions were closed with 4-0 monocryl with bandaids placed on top.    Attention was turned to the vagina. A weighted speculum was reinserted into the vagina and the uterine manipulator was withdrawn. Single toothed tenaculum was removed from the anterior lip of the cervix. Good hemostasis was confirmed at the tenaculum puncture sites. Speculum was then removed from the vagina. Mccoy catheter was removed.     At the conclusion of the procedure, all needle, sponge, and instrument counts were noted to be correct x2. Patient tolerated the procedure well and was transferred to PACU in stable condition prior to discharge with follow up in 1-2 weeks.    Dr. Toussaint-Foster was present and participated in all key portions of the case.      Patient Disposition:  PACU         SIGNATURE: Shaunna Chaudhari MD  DATE: February 7, 2025  TIME: 8:44 AM

## 2025-02-11 PROCEDURE — 88302 TISSUE EXAM BY PATHOLOGIST: CPT | Performed by: PATHOLOGY

## 2025-02-18 NOTE — PROGRESS NOTES
Daily Note     Today's date: 2025  Patient name: Etta Barriga  : 1990  MRN: [id number]  Referring provider: Darcie Ramirez CR*  Dx:   Encounter Diagnosis     ICD-10-CM    1. Postpartum care and examination  Z39.2           Start Time: 1010  Stop Time: 1050  Total time in clinic (min): 40 minutes    Chief Complaint: Mid Thorcic Pain   HPI: Etta Barriga is a 34 y.o.  female who presents 5 weeks s/p Vaginal delivery. Baby Shellie was born at 39 weeks gestation and weighed 7lb 7oz. Pt presents w/ high levels of mid thoracic pain limiting walking, sitting, standing, transfers, carrying child and changing child.   Occupation: [employer] (Returning March - long periods of standing and walking)         Subjective: 7/10 p! Today.  Limited in HEP availability following procedure.  However, notes she has been trying to do exercises when available and noted an improvement in pain.      Objective: See treatment diary below      Assessment: Tolerated treatment well. Pt tolerated static and dynamic facial decompression well with improvements in pain reported following treatment. Educated on precautions and potential skin marking. Incorporated global stretching to promote thoracic extension w/ good tolerance.  Next visit incorporated low-level exercise and ergonomic training as tolerated.  Patient would benefit from continued PT.      Plan: Continue per plan of care       Precautions: Standard, PP      Diagnosis:    POC expires (Date that your POC expires) Auth Status? (BOMN, approved, pending) Unit limit (Daily) Auth Start date Expiration date PT/OT + Visit Limit?   4/10/2025      Auth after 24     Date of Service        Visits Used 1 2       Visits Remaining        [facility] Created                  Neuro Re-Ed         Urge deferral         Defecation mechanics         Breathing Mechanics         PFM Coordination         PFM Down Training         Internal Cueing           Biofeedback                  Ther Ex         PFM Strengthening         Hip strengthening         Functional Strengthening         Abdominal Strengthening         Aerobic         Therapeutic Rest Breaks         Mobility  Child's pose 1'     Butterfly w/ DB 1'     Prone press to elbows x10     Thread the needle x10  Pball thoracici ex in sitting and at bar x10 ea    Lateral trunk stretch x5 ea     Cat cow w/ Pball roll x5     Cat cow x5     Rounded shoulders to thoracic ex x7     Thoracic rotation in half kneel x5 each  Side                  High Impact         UE Strengthening                   Ther Activity         Voiding Diaries         Review of sxs         Fluid Intake         Ergonomics   **               Manual Ther         PFM exam         Ortho exam Performed         Dynamometer Testing         Fascial Decompression  In prone 25' -  R/L QL edcuated on precautions       Bowel Massage                  Modalities                           Outcome Measure

## 2025-02-19 ENCOUNTER — OFFICE VISIT (OUTPATIENT)
Dept: PHYSICAL THERAPY | Facility: REHABILITATION | Age: 35
End: 2025-02-19
Payer: COMMERCIAL

## 2025-02-19 PROCEDURE — 97140 MANUAL THERAPY 1/> REGIONS: CPT

## 2025-02-19 PROCEDURE — 97110 THERAPEUTIC EXERCISES: CPT

## 2025-02-24 ENCOUNTER — OFFICE VISIT (OUTPATIENT)
Dept: OBGYN CLINIC | Facility: CLINIC | Age: 35
End: 2025-02-24

## 2025-02-24 VITALS — DIASTOLIC BLOOD PRESSURE: 70 MMHG | SYSTOLIC BLOOD PRESSURE: 112 MMHG | WEIGHT: 180.6 LBS | BODY MASS INDEX: 29.15 KG/M2

## 2025-02-24 DIAGNOSIS — Z98.51 S/P TUBAL LIGATION: ICD-10-CM

## 2025-02-24 DIAGNOSIS — Z09 POSTOP CHECK: Primary | ICD-10-CM

## 2025-02-24 DIAGNOSIS — N80.9 ENDOMETRIOSIS DETERMINED BY LAPAROSCOPY: ICD-10-CM

## 2025-02-24 PROCEDURE — 99213 OFFICE O/P EST LOW 20 MIN: CPT | Performed by: OBSTETRICS & GYNECOLOGY

## 2025-02-24 NOTE — PROGRESS NOTES
PROBLEM GYNECOLOGICAL VISIT    Etta Barriga is a 34 y.o. female who presents today for postop check.  Her general medical history has been reviewed and she reports it as follows:    Past Medical History:   Diagnosis Date    Hyperlipidemia     Kidney stone      Past Surgical History:   Procedure Laterality Date    NO PAST SURGERIES      ME LAPAROSCOPY W/RMVL ADNEXAL STRUCTURES Bilateral 2025    Procedure: DIAGNOSTIC LAP BILATERAL SALPINGECTOMY & EUA;  Surgeon: Yardlie Toussaint-Foster, DO;  Location: [facility];  Service: Gynecology     OB History          3    Para   3    Term   3       0    AB   0    Living   3         SAB   0    IAB   0    Ectopic   0    Multiple   0    Live Births   3               Social History     Tobacco Use    Smoking status: Never     Passive exposure: Never    Smokeless tobacco: Never   Vaping Use    Vaping status: Never Used   Substance Use Topics    Alcohol use: Not Currently     Comment: couple times/month    Drug use: Never     Social History     Substance and Sexual Activity   Sexual Activity Yes    Partners: Male    Birth control/protection: None       Current Outpatient Medications   Medication Instructions    acetaminophen (TYLENOL) 975 mg, Oral, Every 6 hours    ibuprofen (MOTRIN) 600 mg, Oral, Every 6 hours PRN    Multiple Vitamin (MULTI VITAMIN PO) Daily    oxyCODONE (ROXICODONE) 5 mg, Oral, Every 4 hours PRN    Prenatal Vit-Fe Fumarate-FA (Prenatal Plus Vitamin/Mineral) 27-1 MG TABS 1 tablet, Oral, Daily       History of Present Illness:   Patient presents s/p EUA, Diagnostic laparoscopy, Bilateral salpingectomy with no complaints.    Review of Systems:  Review of Systems   Genitourinary:  Negative for pelvic pain, vaginal bleeding and vaginal discharge.   All other systems reviewed and are negative.      Physical Exam:  /70 (BP Location: Left arm, Patient Position: Sitting, Cuff Size: Standard)   Wt 81.9 kg (180 lb 9.6 oz)   LMP  02/05/2025 (Approximate)   Breastfeeding No   BMI 29.15 kg/m²   Physical Exam  Constitutional:       Appearance: Normal appearance.   Abdominal:      General: Bowel sounds are normal.      Palpations: Abdomen is soft.      Comments: Port sites well healed   Neurological:      Mental Status: She is alert.   Vitals and nursing note reviewed.         Discussion:  Discuss with patient pathology consist of benign findings. Operative findings consisting of endometriosis.(Stage 2).       Assessment:   1. S/p sterilization   2. Endometriosis    Plan:      1. Return to office 3mos annual.       Reviewed with patient that [facility], but that they will not necessarily be reviewed by me immediately.  Explained that I will review results at my earliest opportunity and contact patient appropriately.   The patient is a 44y Female complaining of bite, animal.

## 2025-02-25 ENCOUNTER — APPOINTMENT (OUTPATIENT)
Dept: PHYSICAL THERAPY | Facility: REHABILITATION | Age: 35
End: 2025-02-25
Payer: COMMERCIAL

## 2025-02-25 NOTE — PROGRESS NOTES
Daily Note     Today's date: 2025  Patient name: Etta Barriga  : 1990  MRN: [id number]  Referring provider: Darcie Ramirez CR*  Dx:   No diagnosis found.                 Chief Complaint: Mid Thorcic Pain   HPI: Etta Barriga is a 34 y.o.  female who presents 5 weeks s/p Vaginal delivery. Baby Shellie was born at 39 weeks gestation and weighed 7lb 7oz. Pt presents w/ high levels of mid thoracic pain limiting walking, sitting, standing, transfers, carrying child and changing child.   Occupation: [employer] (Returning March - long periods of standing and walking)         Subjective: 7/10 p! Today.  Limited in HEP availability following procedure.  However, notes she has been trying to do exercises when available and noted an improvement in pain.      Objective: See treatment diary below      Assessment: Tolerated treatment well. Pt tolerated static and dynamic facial decompression well with improvements in pain reported following treatment. Educated on precautions and potential skin marking. Incorporated global stretching to promote thoracic extension w/ good tolerance.  Next visit incorporated low-level exercise and ergonomic training as tolerated.  Patient would benefit from continued PT.      Plan: Continue per plan of care       Precautions: Standard, PP      Diagnosis:    POC expires (Date that your POC expires) Auth Status? (BOMN, approved, pending) Unit limit (Daily) Auth Start date Expiration date PT/OT + Visit Limit?   4/10/2025      Auth after 24     Date of Service        Visits Used 1 2       Visits Remaining 23 22       [facility] Created                  Neuro Re-Ed         Urge deferral         Defecation mechanics         Breathing Mechanics         PFM Coordination         PFM Down Training         Internal Cueing          Biofeedback                  Ther Ex         PFM Strengthening         Hip strengthening         Functional Strengthening          Abdominal Strengthening         Aerobic         Therapeutic Rest Breaks         Mobility  Child's pose 1'     Butterfly w/ DB 1'     Prone press to elbows x10     Thread the needle x10  Pball thoracici ex in sitting and at bar x10 ea    Lateral trunk stretch x5 ea     Cat cow w/ Pball roll x5     Cat cow x5     Rounded shoulders to thoracic ex x7     Thoracic rotation in half kneel x5 each  Side                  High Impact         UE Strengthening                   Ther Activity         Voiding Diaries         Review of sxs         Fluid Intake         Ergonomics   **               Manual Ther         PFM exam         Ortho exam Performed         Dynamometer Testing         Fascial Decompression  In prone 25' -  R/L QL edcuated on precautions       Bowel Massage                  Modalities                           Outcome Measure

## 2025-02-27 PROBLEM — Z98.51 S/P TUBAL LIGATION: Status: ACTIVE | Noted: 2025-02-27

## 2025-02-27 PROBLEM — N80.9 ENDOMETRIOSIS DETERMINED BY LAPAROSCOPY: Status: ACTIVE | Noted: 2025-02-27

## 2025-03-04 ENCOUNTER — TELEPHONE (OUTPATIENT)
Dept: PHYSICAL THERAPY | Facility: REHABILITATION | Age: 35
End: 2025-03-04

## 2025-03-04 NOTE — TELEPHONE ENCOUNTER
Spoke to pt, pain is improving but not resolved. Unable to schedule this week because caring for children. Would like a call back in a week or 2 in order to schedule again.

## 2025-03-28 ENCOUNTER — TELEPHONE (OUTPATIENT)
Dept: PHYSICAL THERAPY | Facility: REHABILITATION | Age: 35
End: 2025-03-28

## 2025-05-05 ENCOUNTER — ANNUAL EXAM (OUTPATIENT)
Dept: OBGYN CLINIC | Facility: CLINIC | Age: 35
End: 2025-05-05

## 2025-05-05 ENCOUNTER — APPOINTMENT (OUTPATIENT)
Dept: LAB | Facility: CLINIC | Age: 35
End: 2025-05-05
Payer: COMMERCIAL

## 2025-05-05 VITALS
SYSTOLIC BLOOD PRESSURE: 106 MMHG | HEIGHT: 66 IN | BODY MASS INDEX: 26.78 KG/M2 | WEIGHT: 166.6 LBS | DIASTOLIC BLOOD PRESSURE: 62 MMHG

## 2025-05-05 DIAGNOSIS — Z01.419 ROUTINE GYNECOLOGICAL EXAMINATION: Primary | ICD-10-CM

## 2025-05-05 DIAGNOSIS — N92.6 IRREGULAR MENSES: ICD-10-CM

## 2025-05-05 LAB
T4 FREE SERPL-MCNC: 1.31 NG/DL (ref 0.61–1.12)
TSH SERPL DL<=0.05 MIU/L-ACNC: 8.71 UIU/ML (ref 0.45–4.5)

## 2025-05-05 PROCEDURE — 84439 ASSAY OF FREE THYROXINE: CPT | Performed by: OBSTETRICS & GYNECOLOGY

## 2025-05-05 PROCEDURE — 99395 PREV VISIT EST AGE 18-39: CPT | Performed by: OBSTETRICS & GYNECOLOGY

## 2025-05-05 PROCEDURE — 36415 COLL VENOUS BLD VENIPUNCTURE: CPT | Performed by: OBSTETRICS & GYNECOLOGY

## 2025-05-05 PROCEDURE — 84443 ASSAY THYROID STIM HORMONE: CPT | Performed by: OBSTETRICS & GYNECOLOGY

## 2025-05-05 NOTE — PROGRESS NOTES
ANNUAL GYNECOLOGICAL EXAMINATION    Etta Barriga is a 35 y.o. female who presents today for annual GYN exam.  Her last pap smear was performed 10/25/23 and result was negative.  She reports no history of abnormal pap smears in her past.   She had HIV screening performed 24 and it was negative.  She reports menses as irregular.  Patient's last menstrual period was 2025 (exact date).  Her general medical history has been reviewed and she reports it as follows:    Past Medical History:   Diagnosis Date    Hyperlipidemia     Kidney stone      Past Surgical History:   Procedure Laterality Date    NO PAST SURGERIES      CA LAPAROSCOPY W/RMVL ADNEXAL STRUCTURES Bilateral 2025    Procedure: DIAGNOSTIC LAP BILATERAL SALPINGECTOMY & EUA;  Surgeon: Yardlie Toussaint-Foster, DO;  Location: [facility] OR;  Service: Gynecology     OB History          3    Para   3    Term   3       0    AB   0    Living   3         SAB   0    IAB   0    Ectopic   0    Multiple   0    Live Births   3               Social History     Tobacco Use    Smoking status: Never     Passive exposure: Never    Smokeless tobacco: Never   Vaping Use    Vaping status: Never Used   Substance Use Topics    Alcohol use: Not Currently     Comment: couple times/month    Drug use: Never     Social History     Substance and Sexual Activity   Sexual Activity Yes    Partners: Male    Birth control/protection: None     Cancer-related family history is negative for Cancer, Breast cancer, Colon cancer, and Ovarian cancer.    Current Outpatient Medications   Medication Instructions    acetaminophen (TYLENOL) 975 mg, Oral, Every 6 hours    ibuprofen (MOTRIN) 600 mg, Oral, Every 6 hours PRN    Multiple Vitamin (MULTI VITAMIN PO) Daily    oxyCODONE (ROXICODONE) 5 mg, Oral, Every 4 hours PRN    Prenatal Vit-Fe Fumarate-FA (Prenatal Plus Vitamin/Mineral) 27-1 MG TABS 1 tablet, Oral, Daily       Review of Systems:  Review of Systems  "  Genitourinary:  Positive for menstrual problem. Negative for pelvic pain, vaginal bleeding and vaginal discharge.   All other systems reviewed and are negative.      Physical Exam:  /62 (Patient Position: Sitting, Cuff Size: Large)   Ht 5' 6\" (1.676 m)   Wt 75.6 kg (166 lb 9.6 oz)   LMP 04/20/2025 (Exact Date)   BMI 26.89 kg/m²   Physical Exam  Constitutional:       Appearance: Normal appearance.   Genitourinary:      Bladder and urethral meatus normal.      No lesions in the vagina.      Right Labia: No rash, tenderness or lesions.     Left Labia: No tenderness, lesions or rash.     No inguinal adenopathy present in the right or left side.     No vaginal discharge.        Right Adnexa: not tender, not full and no mass present.     Left Adnexa: not tender, not full and no mass present.     No cervical motion tenderness, discharge or lesion.      Uterus is not enlarged or tender.      No uterine mass detected.     No urethral tenderness or mass present.   Breasts:     Breasts are soft.     Right: No swelling, inverted nipple, mass, nipple discharge, skin change or tenderness.      Left: No swelling, inverted nipple, mass, nipple discharge, skin change or tenderness.   HENT:      Head: Normocephalic and atraumatic.   Cardiovascular:      Rate and Rhythm: Normal rate and regular rhythm.   Pulmonary:      Effort: Pulmonary effort is normal.      Breath sounds: Normal breath sounds.   Abdominal:      General: Bowel sounds are normal.      Palpations: Abdomen is soft.      Hernia: There is no hernia in the left inguinal area or right inguinal area.   Musculoskeletal:         General: Normal range of motion.      Cervical back: Normal range of motion and neck supple.   Lymphadenopathy:      Upper Body:      Right upper body: No supraclavicular or axillary adenopathy.      Left upper body: No axillary adenopathy.      Lower Body: No right inguinal adenopathy. No left inguinal adenopathy.   Neurological:      " Mental Status: She is alert and oriented to person, place, and time.   Skin:     General: Skin is warm and dry.   Psychiatric:         Mood and Affect: Mood normal.   Vitals and nursing note reviewed.           Assessment/Plan:   1. Normal well-woman GYN exam.  2. Cervical cancer screening:  Normal cervical exam.  Has received HPV vaccine in the past.     3. STD screening:  Patient declines.   4. Breast cancer screening:  Normal breast exam.    Reviewed breast self-awareness.   5. BMI Counseling: Body mass index is 26.89 kg/m². Discussed the patient's BMI with her. The BMI is above normal. Nutrition recommendations include decreasing overall calorie intake, decreasing soda and/or juice intake, moderation in carbohydrate intake, increasing intake of lean protein, reducing intake of saturated fat and trans fat, and reducing intake of cholesterol.   6. Contraception:  tubal ligation   7. Irregular menses: TFT ordered   8. Return to office 1yr/prn.    Reviewed with patient that [facility], but that they will not necessarily be reviewed by me immediately.  Explained that I will review results at my earliest opportunity and contact patient appropriately.

## 2025-05-13 DIAGNOSIS — R94.6 ABNORMAL RESULTS OF THYROID FUNCTION STUDIES: Primary | ICD-10-CM

## 2025-05-20 ENCOUNTER — TELEPHONE (OUTPATIENT)
Dept: OBGYN CLINIC | Facility: CLINIC | Age: 35
End: 2025-05-20

## 2025-05-20 NOTE — TELEPHONE ENCOUNTER
Left message that her thyroid studies was elevated recommend she follow up with her PCP and if she does not have a PCP, I can refer her to FM in our building.

## 2025-07-01 ENCOUNTER — OFFICE VISIT (OUTPATIENT)
Dept: ENDOCRINOLOGY | Facility: CLINIC | Age: 35
End: 2025-07-01
Payer: COMMERCIAL

## 2025-07-01 VITALS
HEART RATE: 65 BPM | WEIGHT: 154 LBS | HEIGHT: 66 IN | BODY MASS INDEX: 24.75 KG/M2 | OXYGEN SATURATION: 100 % | SYSTOLIC BLOOD PRESSURE: 110 MMHG | DIASTOLIC BLOOD PRESSURE: 70 MMHG

## 2025-07-01 DIAGNOSIS — R94.6 ABNORMAL RESULTS OF THYROID FUNCTION STUDIES: Primary | ICD-10-CM

## 2025-07-01 DIAGNOSIS — R79.89 ELEVATED TSH: ICD-10-CM

## 2025-07-01 DIAGNOSIS — E55.9 VITAMIN D DEFICIENCY: ICD-10-CM

## 2025-07-01 PROCEDURE — 99244 OFF/OP CNSLTJ NEW/EST MOD 40: CPT | Performed by: INTERNAL MEDICINE

## 2025-07-01 RX ORDER — TIRZEPATIDE 7.5 MG/.5ML
7.5 INJECTION, SOLUTION SUBCUTANEOUS
COMMUNITY
Start: 2025-06-25

## 2025-07-01 NOTE — LETTER
2025     Yardlie Toussaint-Foster, DO  [address]  [address]  [place] [address]    Patient: Etat Barriga   YOB: 1990   Date of Visit: 2025       Dear Dr. Yardlie Toussaint-Foster, DO:    Thank you for referring Etta Barriga to me for evaluation. Below are my notes for this consultation.    If you have questions, please do not hesitate to call me. I look forward to following your patient along with you.         Sincerely,        Ana Hernandez MD        CC: No Recipients    Ana Hernandez MD  2025 10:15 AM  Sign when Signing Visit  Name: Etta Barriga      : 1990      MRN: [id number]  Encounter Provider: Ana Hernandez MD  Encounter Date: 2025   Encounter department: [facility] FOR DIABETES AND ENDOCRINOLOGY [facility] chief complaint on file.  :  Assessment & Plan  Abnormal results of thyroid function studies  Discordant labs with both the TSH and free T4 being high.  Could be related to supplements that she is taking especially biotin.  For now advised to stop all supplements including biotin and have repeat labs done next week.  If labs still abnormal she will need further testing  Orders:  •  Ambulatory Referral to Endocrinology  •  TSH, 3rd generation; Future  •  T4, free; Future    Elevated TSH  Repeat after holding supplementations next week       Vitamin D deficiency  On supplementations-will check vitamin D 25-hydroxy  Orders:  •  Vitamin D 25 hydroxy; Future             History of Present Illness    Etta Barriga is a 35 y.o. female Referred here for evaluation of abnormal thyroid function tests.  She is currently 6  months postpartum   Concern for irregular menstrual cycles- LMP  , may 6th - 2 days ,  ,  , ( first period after delivery )  Not breastfeeding '  Tubal ligation in feb     3 pregnancies - no complications     Menstrual cycles have been fairly regular  "prior to pregnancy but painful   No galactorrhea     Gained 40 lbs during pregnancy and lost 20 lbs since feb   Started zepbound in feb -   Occasional constipation   C/o fatigue , dry skin and feels cold   Sleep disturbances       Takes phentermine sometimes     Takes biotin for a few months         Review of Systems as per [facility]  Medications Ordered Prior to Encounter[1]   Social History[2]     Medical History Reviewed by provider this encounter:  Meds  Med Hx  Surg Hx  Fam Hx     .    Objective  /70   Pulse 65   Ht 5' 6\" (1.676 m)   Wt 69.9 kg (154 lb)   SpO2 100%   BMI 24.86 kg/m²      Body mass index is 24.86 kg/m².  Wt Readings from Last 3 Encounters:   07/01/25 69.9 kg (154 lb)   05/05/25 75.6 kg (166 lb 9.6 oz)   02/24/25 81.9 kg (180 lb 9.6 oz)     Physical Exam  Vitals reviewed.   Constitutional:       General: She is not in acute distress.     Appearance: Normal appearance. She is not ill-appearing, toxic-appearing or diaphoretic.   HENT:      Head: Normocephalic and atraumatic.     Eyes:      General: No scleral icterus.     Extraocular Movements: Extraocular movements intact.       Cardiovascular:      Rate and Rhythm: Normal rate and regular rhythm.      Heart sounds: Normal heart sounds. No murmur heard.  Pulmonary:      Effort: Pulmonary effort is normal. No respiratory distress.      Breath sounds: Normal breath sounds. No wheezing or rales.     Musculoskeletal:      Cervical back: Neck supple.      Right lower leg: No edema.      Left lower leg: No edema.   Lymphadenopathy:      Cervical: No cervical adenopathy.     Skin:     General: Skin is warm and dry.     Neurological:      General: No focal deficit present.      Mental Status: She is alert and oriented to person, place, and time.     Psychiatric:         Mood and Affect: Mood normal.         Behavior: Behavior normal.         Thought Content: Thought content normal.         Judgment: Judgment normal.         Labs: I have reviewed " "pertinent labs including:   Lab Results   Component Value Date    OJR4WUGQKTRO 8.705 (H) 05/05/2025      Lab Results   Component Value Date    FREET4 1.31 (H) 05/05/2025      No results found for: \"[id number]\"     Patient Instructions   Stop biotin and prenatal MVI - any supplements and have labs done next week     Discussed with the patient and all questioned fully answered. She will call me if any problems arise.             [1]  Current Outpatient Medications on File Prior to Visit   Medication Sig Dispense Refill   • acetaminophen (TYLENOL) 325 mg tablet Take 3 tablets (975 mg total) by mouth every 6 (six) hours 360 tablet 0   • ibuprofen (MOTRIN) 600 mg tablet Take 1 tablet (600 mg total) by mouth every 6 (six) hours as needed for mild pain 30 tablet 0   • Multiple Vitamin (MULTI VITAMIN PO) Take by mouth in the morning     • Prenatal Vit-Fe Fumarate-FA (Prenatal Plus Vitamin/Mineral) 27-1 MG TABS Take 1 tablet by mouth in the morning 90 tablet 4   • Zepbound 7.5 MG/0.5ML auto-injector 7.5 mg     • [DISCONTINUED] oxyCODONE (Roxicodone) 5 immediate release tablet Take 1 tablet (5 mg total) by mouth every 4 (four) hours as needed for moderate pain Max Daily Amount: 30 mg (Patient not taking: Reported on 2/24/2025) 5 tablet 0     No current facility-administered medications on file prior to visit.   [2]  Social History  Tobacco Use   • Smoking status: Never     Passive exposure: Never   • Smokeless tobacco: Never   Vaping Use   • Vaping status: Never Used   Substance and Sexual Activity   • Alcohol use: Not Currently     Comment: couple times/month   • Drug use: Never   • Sexual activity: Yes     Partners: Male     Birth control/protection: None     "

## 2025-07-01 NOTE — PROGRESS NOTES
"Name: Etta Barriga      : 1990      MRN: [id number]  Encounter Provider: Ana Hernandez MD  Encounter Date: 2025   Encounter department: [facility] FOR DIABETES AND ENDOCRINOLOGY [place]    No chief complaint on file.  :  Assessment & Plan  Abnormal results of thyroid function studies  Discordant labs with both the TSH and free T4 being high.  Could be related to supplements that she is taking especially biotin.  For now advised to stop all supplements including biotin and have repeat labs done next week.  If labs still abnormal she will need further testing  Orders:  •  Ambulatory Referral to Endocrinology  •  TSH, 3rd generation; Future  •  T4, free; Future    Elevated TSH  Repeat after holding supplementations next week       Vitamin D deficiency  On supplementations-will check vitamin D 25-hydroxy  Orders:  •  Vitamin D 25 hydroxy; Future              History of Present Illness     Etta Barriga is a 35 y.o. female Referred here for evaluation of abnormal thyroid function tests.  She is currently 6  months postpartum   Concern for irregular menstrual cycles- LMP  , may 6th - 2 days ,  ,  , ( first period after delivery )  Not breastfeeding '  Tubal ligation in feb     3 pregnancies - no complications     Menstrual cycles have been fairly regular prior to pregnancy but painful   No galactorrhea     Gained 40 lbs during pregnancy and lost 20 lbs since feb   Started zepbound in feb -   Occasional constipation   C/o fatigue , dry skin and feels cold   Sleep disturbances       Takes phentermine sometimes     Takes biotin for a few months         Review of Systems as per HPI  Medications Ordered Prior to Encounter[1]   Social History[2]     Medical History Reviewed by provider this encounter:  Meds  Med Hx  Surg Hx  Fam Hx     .    Objective   /70   Pulse 65   Ht 5' 6\" (1.676 m)   Wt 69.9 kg (154 lb)   SpO2 100%   BMI " "24.86 kg/m²      Body mass index is 24.86 kg/m².  Wt Readings from Last 3 Encounters:   07/01/25 69.9 kg (154 lb)   05/05/25 75.6 kg (166 lb 9.6 oz)   02/24/25 81.9 kg (180 lb 9.6 oz)     Physical Exam  Vitals reviewed.   Constitutional:       General: She is not in acute distress.     Appearance: Normal appearance. She is not ill-appearing, toxic-appearing or diaphoretic.   HENT:      Head: Normocephalic and atraumatic.     Eyes:      General: No scleral icterus.     Extraocular Movements: Extraocular movements intact.       Cardiovascular:      Rate and Rhythm: Normal rate and regular rhythm.      Heart sounds: Normal heart sounds. No murmur heard.  Pulmonary:      Effort: Pulmonary effort is normal. No respiratory distress.      Breath sounds: Normal breath sounds. No wheezing or rales.     Musculoskeletal:      Cervical back: Neck supple.      Right lower leg: No edema.      Left lower leg: No edema.   Lymphadenopathy:      Cervical: No cervical adenopathy.     Skin:     General: Skin is warm and dry.     Neurological:      General: No focal deficit present.      Mental Status: She is alert and oriented to person, place, and time.     Psychiatric:         Mood and Affect: Mood normal.         Behavior: Behavior normal.         Thought Content: Thought content normal.         Judgment: Judgment normal.         Labs: I have reviewed pertinent labs including:   Lab Results   Component Value Date    [id number] 8.705 (H) 05/05/2025      Lab Results   Component Value Date    FREET4 1.31 (H) 05/05/2025      No results found for: \"[id number]\"     Patient Instructions   Stop biotin and prenatal MVI - any supplements and have labs done next week     Discussed with the patient and all questioned fully answered. She will call me if any problems arise.             [1]  Current Outpatient Medications on File Prior to Visit   Medication Sig Dispense Refill   • acetaminophen (TYLENOL) 325 mg tablet Take 3 tablets (975 mg " total) by mouth every 6 (six) hours 360 tablet 0   • ibuprofen (MOTRIN) 600 mg tablet Take 1 tablet (600 mg total) by mouth every 6 (six) hours as needed for mild pain 30 tablet 0   • Multiple Vitamin (MULTI VITAMIN PO) Take by mouth in the morning     • Prenatal Vit-Fe Fumarate-FA (Prenatal Plus Vitamin/Mineral) 27-1 MG TABS Take 1 tablet by mouth in the morning 90 tablet 4   • Zepbound 7.5 MG/0.5ML auto-injector 7.5 mg     • [DISCONTINUED] oxyCODONE (Roxicodone) 5 immediate release tablet Take 1 tablet (5 mg total) by mouth every 4 (four) hours as needed for moderate pain Max Daily Amount: 30 mg (Patient not taking: Reported on 2/24/2025) 5 tablet 0     No current facility-administered medications on file prior to visit.   [2]  Social History  Tobacco Use   • Smoking status: Never     Passive exposure: Never   • Smokeless tobacco: Never   Vaping Use   • Vaping status: Never Used   Substance and Sexual Activity   • Alcohol use: Not Currently     Comment: couple times/month   • Drug use: Never   • Sexual activity: Yes     Partners: Male     Birth control/protection: None

## 2025-07-01 NOTE — ASSESSMENT & PLAN NOTE
Discordant labs with both the TSH and free T4 being high.  Could be related to supplements that she is taking especially biotin.  For now advised to stop all supplements including biotin and have repeat labs done next week.  If labs still abnormal she will need further testing  Orders:  •  Ambulatory Referral to Endocrinology  •  TSH, 3rd generation; Future  •  T4, free; Future

## 2025-07-09 ENCOUNTER — APPOINTMENT (OUTPATIENT)
Dept: LAB | Facility: CLINIC | Age: 35
End: 2025-07-09
Payer: COMMERCIAL

## 2025-07-09 ENCOUNTER — OFFICE VISIT (OUTPATIENT)
Dept: OBGYN CLINIC | Facility: CLINIC | Age: 35
End: 2025-07-09

## 2025-07-09 VITALS — WEIGHT: 155.4 LBS | DIASTOLIC BLOOD PRESSURE: 60 MMHG | BODY MASS INDEX: 25.08 KG/M2 | SYSTOLIC BLOOD PRESSURE: 100 MMHG

## 2025-07-09 DIAGNOSIS — R94.6 ABNORMAL RESULTS OF THYROID FUNCTION STUDIES: ICD-10-CM

## 2025-07-09 DIAGNOSIS — E55.9 VITAMIN D DEFICIENCY: ICD-10-CM

## 2025-07-09 DIAGNOSIS — Z71.2 ENCOUNTER TO DISCUSS TEST RESULTS: Primary | ICD-10-CM

## 2025-07-09 PROBLEM — N80.9 ENDOMETRIOSIS DETERMINED BY LAPAROSCOPY: Status: RESOLVED | Noted: 2025-02-27 | Resolved: 2025-07-09

## 2025-07-09 PROBLEM — Z98.51 S/P TUBAL LIGATION: Status: RESOLVED | Noted: 2025-02-27 | Resolved: 2025-07-09

## 2025-07-09 LAB
25(OH)D3 SERPL-MCNC: 50.6 NG/ML (ref 30–100)
T4 FREE SERPL-MCNC: 0.62 NG/DL (ref 0.61–1.12)
TSH SERPL DL<=0.05 MIU/L-ACNC: 5.64 UIU/ML (ref 0.45–4.5)

## 2025-07-09 PROCEDURE — 84439 ASSAY OF FREE THYROXINE: CPT

## 2025-07-09 PROCEDURE — 82306 VITAMIN D 25 HYDROXY: CPT

## 2025-07-09 PROCEDURE — 84443 ASSAY THYROID STIM HORMONE: CPT

## 2025-07-09 PROCEDURE — 99213 OFFICE O/P EST LOW 20 MIN: CPT | Performed by: OBSTETRICS & GYNECOLOGY

## 2025-07-09 PROCEDURE — 36415 COLL VENOUS BLD VENIPUNCTURE: CPT

## 2025-07-13 NOTE — PROGRESS NOTES
PROBLEM GYNECOLOGICAL VISIT    Etta Barriga is a 35 y.o. female who presents today for follow up.  Her general medical history has been reviewed and she reports it as follows:    Past Medical History[1]  Past Surgical History[2]  OB History          3    Para   3    Term   3       0    AB   0    Living   3         SAB   0    IAB   0    Ectopic   0    Multiple   0    Live Births   3               Social History[3]  Social History     Substance and Sexual Activity   Sexual Activity Yes    Partners: Male    Birth control/protection: None       Current Outpatient Medications   Medication Instructions    acetaminophen (TYLENOL) 975 mg, Oral, Every 6 hours    ibuprofen (MOTRIN) 600 mg, Oral, Every 6 hours PRN    Multiple Vitamin (MULTI VITAMIN PO) Daily    Prenatal Vit-Fe Fumarate-FA (Prenatal Plus Vitamin/Mineral) 27-1 MG TABS 1 tablet, Oral, Daily    Zepbound 7.5 mg       History of Present Illness:   Patient presents for follow s/p TFT for irregular menses.  Patient stated had her labs drawn this morning prior to her appt.    Review of Systems:  Review of Systems   Genitourinary:  Positive for menstrual problem. Negative for pelvic pain, vaginal bleeding and vaginal discharge.        Irregular menses   All other systems reviewed and are negative.      Physical Exam:  /60 (Patient Position: Standing, Cuff Size: Standard)   Wt 70.5 kg (155 lb 6.4 oz)   LMP 2025 (Exact Date)   BMI 25.08 kg/m²   Physical Exam  Constitutional:       Appearance: Normal appearance.     Neurological:      Mental Status: She is alert.   Vitals and nursing note reviewed.          Assessment:   1. Irregular menses    Plan:   1. Awaiting TFT results.   2. Return to office 6wks.   3. Patient's depression screening was assessed with a PHQ-2 score of 0. Clinically patient does not have depression. No treatment is required.      Reviewed with patient that [facility], but  that they will not necessarily be reviewed by me immediately.  Explained that I will review results at my earliest opportunity and contact patient appropriately.       [1]   Past Medical History:  Diagnosis Date    Endometriosis determined by laparoscopy 02/27/2025    Asymptomatic no meds required      Hyperlipidemia     Kidney stone 2022    S/P tubal ligation 02/27/2025   [2]   Past Surgical History:  Procedure Laterality Date    NO PAST SURGERIES      [place] LAPAROSCOPY W/RMVL ADNEXAL STRUCTURES Bilateral 2/7/2025    Procedure: DIAGNOSTIC LAP BILATERAL SALPINGECTOMY & EUA;  Surgeon: Yardlie Toussaint-Foster, DO;  Location: [facility];  Service: Gynecology   [3]   Social History  Tobacco Use    Smoking status: Never     Passive exposure: Never    Smokeless tobacco: Never   Vaping Use    Vaping status: Never Used   Substance Use Topics    Alcohol use: Not Currently     Comment: couple times/month    Drug use: Never

## (undated) DEVICE — BETHLEHEM UNIVERSAL GYN LAP PK: Brand: CARDINAL HEALTH

## (undated) DEVICE — PVC URETHRAL CATHETER: Brand: DOVER

## (undated) DEVICE — TROCAR: Brand: KII SLEEVE

## (undated) DEVICE — SUT MONOCRYL 4-0 PS-2 27 IN Y426H

## (undated) DEVICE — DRAPE EQUIPMENT RF WAND

## (undated) DEVICE — INTENDED FOR TISSUE SEPARATION, AND OTHER PROCEDURES THAT REQUIRE A SHARP SURGICAL BLADE TO PUNCTURE OR CUT.: Brand: BARD-PARKER SAFETY BLADES SIZE 11, STERILE

## (undated) DEVICE — TRAY FOLEY 16FR URIMETER SURESTEP

## (undated) DEVICE — [HIGH FLOW INSUFFLATOR,  DO NOT USE IF PACKAGE IS DAMAGED,  KEEP DRY,  KEEP AWAY FROM SUNLIGHT,  PROTECT FROM HEAT AND RADIOACTIVE SOURCES.]: Brand: PNEUMOSURE

## (undated) DEVICE — PLASTIC ADHESIVE BANDAGE: Brand: CURITY

## (undated) DEVICE — CHLORAPREP HI-LITE 26ML ORANGE

## (undated) DEVICE — BLUE HEAT SCOPE WARMER

## (undated) DEVICE — MARYLAND JAW LAPAROSCOPIC SEALER/DIVIDER COATED: Brand: LIGASURE

## (undated) DEVICE — GLOVE INDICATOR PI UNDERGLOVE SZ 6.5 BLUE

## (undated) DEVICE — GLOVE PI ULTRA TOUCH SZ 6

## (undated) DEVICE — PREMIUM DRY TRAY LF: Brand: MEDLINE INDUSTRIES, INC.